# Patient Record
Sex: FEMALE | Race: WHITE | NOT HISPANIC OR LATINO | Employment: OTHER | URBAN - METROPOLITAN AREA
[De-identification: names, ages, dates, MRNs, and addresses within clinical notes are randomized per-mention and may not be internally consistent; named-entity substitution may affect disease eponyms.]

---

## 2024-01-26 ENCOUNTER — HOSPITAL ENCOUNTER (OUTPATIENT)
Dept: RADIOLOGY | Facility: HOSPITAL | Age: 74
Discharge: HOME | End: 2024-01-26
Payer: MEDICARE

## 2024-01-26 DIAGNOSIS — R10.30 LOWER ABDOMINAL PAIN, UNSPECIFIED: ICD-10-CM

## 2024-01-26 DIAGNOSIS — R10.30 LOWER ABDOMINAL PAIN, UNSPECIFIED: Primary | ICD-10-CM

## 2024-01-26 LAB
CREAT SERPL-MCNC: 1.5 MG/DL (ref 0.4–1.6)
EGFRCR SERPLBLD CKD-EPI 2021: 36 ML/MIN/1.73M*2

## 2024-01-26 PROCEDURE — 82565 ASSAY OF CREATININE: CPT

## 2024-01-26 PROCEDURE — 36415 COLL VENOUS BLD VENIPUNCTURE: CPT

## 2024-01-26 PROCEDURE — 74176 CT ABD & PELVIS W/O CONTRAST: CPT

## 2024-02-02 ENCOUNTER — OFFICE VISIT (OUTPATIENT)
Dept: HEMATOLOGY/ONCOLOGY | Facility: CLINIC | Age: 74
End: 2024-02-02
Payer: MEDICARE

## 2024-02-02 VITALS
HEART RATE: 68 BPM | HEIGHT: 63 IN | DIASTOLIC BLOOD PRESSURE: 73 MMHG | TEMPERATURE: 97.3 F | WEIGHT: 202.05 LBS | SYSTOLIC BLOOD PRESSURE: 108 MMHG | OXYGEN SATURATION: 96 % | BODY MASS INDEX: 35.8 KG/M2 | RESPIRATION RATE: 18 BRPM

## 2024-02-02 DIAGNOSIS — C85.90 LYMPHOMA (MULTI): ICD-10-CM

## 2024-02-02 PROCEDURE — 99205 OFFICE O/P NEW HI 60 MIN: CPT | Performed by: INTERNAL MEDICINE

## 2024-02-02 PROCEDURE — 1159F MED LIST DOCD IN RCRD: CPT | Performed by: INTERNAL MEDICINE

## 2024-02-02 PROCEDURE — 1126F AMNT PAIN NOTED NONE PRSNT: CPT | Performed by: INTERNAL MEDICINE

## 2024-02-02 PROCEDURE — 99215 OFFICE O/P EST HI 40 MIN: CPT | Performed by: INTERNAL MEDICINE

## 2024-02-02 PROCEDURE — 1036F TOBACCO NON-USER: CPT | Performed by: INTERNAL MEDICINE

## 2024-02-02 RX ORDER — LISINOPRIL 5 MG/1
5 TABLET ORAL DAILY
COMMUNITY

## 2024-02-02 RX ORDER — PANTOPRAZOLE SODIUM 40 MG/1
40 TABLET, DELAYED RELEASE ORAL
COMMUNITY
End: 2024-05-09

## 2024-02-02 RX ORDER — LEVOTHYROXINE SODIUM 50 UG/1
50 TABLET ORAL
COMMUNITY

## 2024-02-02 RX ORDER — ALLOPURINOL 300 MG/1
300 TABLET ORAL DAILY
COMMUNITY
End: 2024-02-12 | Stop reason: ALTCHOICE

## 2024-02-02 RX ORDER — PRAVASTATIN SODIUM 80 MG/1
80 TABLET ORAL DAILY
COMMUNITY

## 2024-02-02 RX ORDER — FERROUS SULFATE, DRIED 160(50) MG
2 TABLET, EXTENDED RELEASE ORAL DAILY
COMMUNITY

## 2024-02-02 RX ORDER — FUROSEMIDE 20 MG/1
TABLET ORAL
COMMUNITY
End: 2024-05-09

## 2024-02-02 RX ORDER — TRAMADOL HYDROCHLORIDE 50 MG/1
50 TABLET ORAL EVERY 6 HOURS PRN
Qty: 100 TABLET | Refills: 0 | Status: SHIPPED | OUTPATIENT
Start: 2024-02-02 | End: 2024-03-05

## 2024-02-02 RX ORDER — DICYCLOMINE HYDROCHLORIDE 20 MG/1
TABLET ORAL 3 TIMES DAILY PRN
COMMUNITY

## 2024-02-02 RX ORDER — MELOXICAM 7.5 MG/1
7.5 TABLET ORAL DAILY
COMMUNITY
End: 2024-02-12 | Stop reason: ALTCHOICE

## 2024-02-02 RX ORDER — CIPROFLOXACIN 500 MG/5ML
KIT ORAL
COMMUNITY
End: 2024-02-12 | Stop reason: ALTCHOICE

## 2024-02-02 RX ORDER — METOPROLOL SUCCINATE 50 MG/1
50 TABLET, EXTENDED RELEASE ORAL DAILY
COMMUNITY

## 2024-02-02 RX ORDER — HYDROXYCHLOROQUINE SULFATE 200 MG/1
200 TABLET, FILM COATED ORAL DAILY
COMMUNITY
End: 2024-05-09

## 2024-02-02 ASSESSMENT — COLUMBIA-SUICIDE SEVERITY RATING SCALE - C-SSRS
2. HAVE YOU ACTUALLY HAD ANY THOUGHTS OF KILLING YOURSELF?: NO
6. HAVE YOU EVER DONE ANYTHING, STARTED TO DO ANYTHING, OR PREPARED TO DO ANYTHING TO END YOUR LIFE?: NO
1. IN THE PAST MONTH, HAVE YOU WISHED YOU WERE DEAD OR WISHED YOU COULD GO TO SLEEP AND NOT WAKE UP?: NO

## 2024-02-02 ASSESSMENT — ENCOUNTER SYMPTOMS
DEPRESSION: 0
LOSS OF SENSATION IN FEET: 0
OCCASIONAL FEELINGS OF UNSTEADINESS: 0

## 2024-02-02 ASSESSMENT — PATIENT HEALTH QUESTIONNAIRE - PHQ9
2. FEELING DOWN, DEPRESSED OR HOPELESS: NOT AT ALL
SUM OF ALL RESPONSES TO PHQ9 QUESTIONS 1 AND 2: 0
1. LITTLE INTEREST OR PLEASURE IN DOING THINGS: NOT AT ALL

## 2024-02-02 ASSESSMENT — PAIN SCALES - GENERAL: PAINLEVEL: 0-NO PAIN

## 2024-02-02 NOTE — PROGRESS NOTES
Patient ID: Kellie Rae is a 73 y.o. female.  Referring Physician: Douglas Yañez MD  [id number] [place] [address]  Primary Care Provider: Lianne Mojica MD  Visit Type:  Initial Visit       Subjective    HPI  Patient is a poor historian and is unable to give any appreciable history.  Referred by primary care doctor because of the results of CT chest abdomen and pelvis showing a soft tissue mass lesion within the mesentery with multiple retroperitoneal and mesenteric lymph and adenopathy.  Radiographically the findings could represent lymphoma.  I was able to elicit from had that his symptoms began about 5 years ago with pain in the abdomen and the back she subsequently was diagnosed with gout and it is not clear the etiology of the of the gout currently on allopurinol and colchicine.  She is also on nonsteroidals anti-inflammatory agents as well.  She was referred to medical oncology because a CT scan of the abdomen showed intra-abdominal lesions reminiscent of lymphadenopathy/Lymphoma.  Biopsy has been ordered to confirm the diagnosis.    CT CAP: 1/26/2024: Bowel: No bowel wall thickening or abnormal distention to suggest  bowel obstruction. Mesenteric Lymph Nodes: Enlarged soft tissue masses are noted within the gastrohepatic ligament, the largest measures 4.0 by 4.3 cm. Enlarged periportal soft tissue mass is noted extending into the portacaval space. It measures at least 4.3 by 4.6 by 3.8 cm. Enlarged portacaval soft tissue lesion measures 2.0 by 2.5 cm. Peritoneum: Soft tissue mass is noted centrally within the abdomen within the mesentery measuring approximately 5.3 by 8.2 cm. Vessels: Unremarkable Retroperitoneum: Right periaortic soft tissue measures 2.1 by 1.8 cm. Multiple left periaortic lymph nodes are noted, the largest measuring 2.0 by 1.3 cm. Abdominal Wall: Unremarkable. Bones: No acute bony abnormalities.      IMPRESSION:  Soft tissue mass lesion within the mesentery  "with multiple  retroperitoneal and mesenteric lymph nodes. Findings may represent  lymphoma.      Signed by: Pedro Miller 1/26/2024  Review of Systems   Constitutional:  Positive for fatigue.   HENT:  Negative.     Eyes: Negative.    Gastrointestinal:  Positive for abdominal pain.        Objective   BSA: 2.02 meters squared  /73 (BP Location: Left arm, Patient Position: Sitting, BP Cuff Size: Large adult)   Pulse 68   Temp 36.3 °C (97.3 °F) (Temporal)   Resp 18   Ht (S) 1.609 m (5' 3.35\")   Wt 91.7 kg (202 lb 0.8 oz)   SpO2 96%   BMI 35.40 kg/m²      has no past medical history on file.   has no past surgical history on file.  No family history on file.  Oncology History    No history exists.       Kellie Rae  reports that she quit smoking about 25 years ago. Her smoking use included cigarettes. She has never used smokeless tobacco.  She  reports no history of alcohol use.  She  reports no history of drug use.    Physical Exam  Constitutional:       Appearance: Normal appearance. She is ill-appearing.   HENT:      Head: Normocephalic and atraumatic.   Eyes:      Extraocular Movements: Extraocular movements intact.      Pupils: Pupils are equal, round, and reactive to light.   Neurological:      Mental Status: She is alert.         WBC   Date/Time Value Ref Range Status   09/24/2019 09:55 AM 9.0 4.5 - 11.0 K/UL Final   09/10/2019 10:30 AM 7.6 4.5 - 11.0 K/UL Final   08/27/2019 09:53 AM 7.8 4.5 - 11.0 K/UL Final     nRBC   Date Value Ref Range Status   09/24/2019 0 0 /100 WBC Final   09/10/2019 0 0 /100 WBC Final   08/27/2019 0 0 /100 WBC Final     RBC   Date Value Ref Range Status   09/24/2019 5.02 (H) 4.0 - 4.9 M/UL Final   09/10/2019 5.26 (H) 4.0 - 4.9 M/UL Final   08/27/2019 4.92 (H) 4.0 - 4.9 M/UL Final     Hemoglobin   Date Value Ref Range Status   09/24/2019 14.9 12.0 - 15.0 GM/DL Final   09/10/2019 15.8 (H) 12.0 - 15.0 GM/DL Final   08/27/2019 14.9 12.0 - 15.0 GM/DL Final     Hematocrit " "  Date Value Ref Range Status   09/24/2019 47.7 (H) 36 - 44 % Final   09/10/2019 50.1 (H) 36 - 44 % Final   08/27/2019 46.8 (H) 36 - 44 % Final     MCV   Date/Time Value Ref Range Status   09/24/2019 09:55 AM 95.0 80 - 100 FL Final   09/10/2019 10:30 AM 95.2 80 - 100 FL Final   08/27/2019 09:53 AM 95.1 80 - 100 FL Final     MCH   Date/Time Value Ref Range Status   09/24/2019 09:55 AM 29.7 26 - 34 PG Final   09/10/2019 10:30 AM 30.0 26 - 34 PG Final   08/27/2019 09:53 AM 30.3 26 - 34 PG Final     MCHC   Date/Time Value Ref Range Status   09/24/2019 09:55 AM 31.2 31 - 37 % Final   09/10/2019 10:30 AM 31.5 31 - 37 % Final   08/27/2019 09:53 AM 31.8 31 - 37 % Final     No results found for: \"RDW\"  Platelets   Date/Time Value Ref Range Status   09/24/2019 09:55  150 - 450 K/UL Final   09/10/2019 10:30  150 - 450 K/UL Final   08/27/2019 09:53  150 - 450 K/UL Final     MPV   Date/Time Value Ref Range Status   09/24/2019 09:55 AM 10.2 7.0 - 12.6 CU Final   09/10/2019 10:30 AM 10.6 7.0 - 12.6 CU Final   08/27/2019 09:53 AM 10.4 7.0 - 12.6 CU Final     No results found for: \"NEUTOPHILPCT\"  No results found for: \"IGPCT\"  Lymphocytes %   Date/Time Value Ref Range Status   09/24/2019 09:55 AM 29.90 20 - 40 % Final   09/10/2019 10:30 AM 29.30 20 - 40 % Final   08/27/2019 09:53 AM 29.90 20 - 40 % Final     Monocytes %   Date/Time Value Ref Range Status   09/24/2019 09:55 AM 8.30 (H) 0 - 8 % Final   09/10/2019 10:30 AM 8.70 (H) 0 - 8 % Final   08/27/2019 09:53 AM 9.20 (H) 0 - 8 % Final     No results found for: \"EOSPCT\"  Basophils %   Date/Time Value Ref Range Status   09/24/2019 09:55 AM 0.70 0 - 1 % Final   09/10/2019 10:30 AM 0.70 0 - 1 % Final   08/27/2019 09:53 AM 0.60 0 - 1 % Final     Neutrophils Absolute   Date/Time Value Ref Range Status   09/24/2019 09:55 AM 5.39 1.8 - 7.7 K/UL Final   09/10/2019 10:30 AM 4.52 1.8 - 7.7 K/UL Final   08/27/2019 09:53 AM 4.59 1.8 - 7.7 K/UL Final     Immature Granulocytes " "Absolute, Automated   Date/Time Value Ref Range Status   09/24/2019 09:55 AM 0.01 0.0 - 0.1 K/UL Final   09/10/2019 10:30 AM 0.02 0.0 - 0.1 K/UL Final   08/27/2019 09:53 AM 0.02 0.0 - 0.1 K/UL Final     Lymphocytes Absolute   Date/Time Value Ref Range Status   09/24/2019 09:55 AM 2.70 1.2 - 3.2 K/UL Final   09/10/2019 10:30 AM 2.22 1.2 - 3.2 K/UL Final   08/27/2019 09:53 AM 2.34 1.2 - 3.2 K/UL Final     Monocytes Absolute   Date/Time Value Ref Range Status   09/24/2019 09:55 AM 0.75 0 - 0.8 K/UL Final   09/10/2019 10:30 AM 0.66 0 - 0.8 K/UL Final   08/27/2019 09:53 AM 0.72 0 - 0.8 K/UL Final     Eosinophils Absolute   Date/Time Value Ref Range Status   09/24/2019 09:55 AM 0.11 0 - 0.45 K/UL Final   09/10/2019 10:30 AM 0.11 0 - 0.45 K/UL Final   08/27/2019 09:53 AM 0.11 0 - 0.45 K/UL Final     Basophils Absolute   Date/Time Value Ref Range Status   09/24/2019 09:55 AM 0.06 0.00 - 0.22 K/UL Final   09/10/2019 10:30 AM 0.05 0.00 - 0.22 K/UL Final   08/27/2019 09:53 AM 0.05 0.00 - 0.22 K/UL Final       No components found for: \"PT\"  No results found for: \"APTT\"    Assessment/Plan      Lymphadenopathy: Symptoms began 2 years ago: poor historian:   IR guided biopsy for the retroperitoneum: LN: RTC 3-4 weeks after Pathology:  Ultram prescribed for pain.     Patient is a poor historian but she indicates that his symptomatology began about 5 years ago initially only only back pain.  Unable to elicit that besides back pain she also had lower extremity pain and was diagnosed with gout.  Currently on allopurinol and colchicine as well as nonsteroidal anti-inflammatory agents.  Was referred because CT scan showed evidence of intra-abdominal lesions reminiscent of lymphadenopathy with a clinical diagnosis of possible lymphoma.    I have ordered an IR guided biopsy of the retroperitoneal lymphadenopathy.  As well as Ultram for pain control.  Further management will depend on the results of the pathological evaluation.  Diagnoses " and all orders for this visit:  Lymphoma (CMS/HCC)  -     Referral to Hematology and Oncology  -     traMADol (Ultram) 50 mg tablet; Take 1 tablet (50 mg) by mouth every 6 hours if needed for severe pain (7 - 10).  -     Clinic Appointment Request Follow Up; [facility]; Future  -     CT guided percutaneous biopsy retroperitoneum; Future           Contreras Lau MD

## 2024-02-02 NOTE — PATIENT INSTRUCTIONS
Today you met with Dr. Rowell.  The radiology department will call you to schedule the abdominal biopsy.  We will see you about 7 days after that procedure to review the results and make a treatment plan.    Please call the office for any questions or concerns.  We ask that you notify us 5-7 days before your medications need refilled.  NANCY Yousif is strongly encouraging all patients to access their MyChart for all results and to communicate with their provider for non-urgent messages.  If an urgent/same day/sick concern response is needed, please call the office at [phone]. Thank You!

## 2024-02-05 ASSESSMENT — ENCOUNTER SYMPTOMS
EYES NEGATIVE: 1
FATIGUE: 1
ABDOMINAL PAIN: 1

## 2024-02-12 ENCOUNTER — PRE-ADMISSION TESTING (OUTPATIENT)
Dept: PREADMISSION TESTING | Facility: HOSPITAL | Age: 74
End: 2024-02-12
Payer: MEDICARE

## 2024-02-12 VITALS
BODY MASS INDEX: 34.02 KG/M2 | HEART RATE: 105 BPM | DIASTOLIC BLOOD PRESSURE: 85 MMHG | OXYGEN SATURATION: 98 % | RESPIRATION RATE: 18 BRPM | HEIGHT: 64 IN | SYSTOLIC BLOOD PRESSURE: 141 MMHG | WEIGHT: 199.3 LBS | TEMPERATURE: 97.2 F

## 2024-02-12 PROCEDURE — 99203 OFFICE O/P NEW LOW 30 MIN: CPT | Performed by: PHYSICIAN ASSISTANT

## 2024-02-12 ASSESSMENT — CHADS2 SCORE
AGE GREATER THAN OR EQUAL TO 75: YES
CHF: NO
PRIOR STROKE OR TIA OR THROMBOEMBOLISM: NO
HYPERTENSION: YES
CHADS2 SCORE: 2
DIABETES: NO

## 2024-02-12 ASSESSMENT — DUKE ACTIVITY SCORE INDEX (DASI)
CAN YOU TAKE CARE OF YOURSELF (EAT, DRESS, BATHE, OR USE TOILET): YES
CAN YOU WALK INDOORS, SUCH AS AROUND YOUR HOUSE: YES
CAN YOU WALK A BLOCK OR TWO ON LEVEL GROUND: YES
CAN YOU HAVE SEXUAL RELATIONS: YES
TOTAL_SCORE: 19.7
CAN YOU DO MODERATE WORK AROUND THE HOUSE LIKE VACUUMING, SWEEPING FLOORS OR CARRYING GROCERIES: NO
DASI METS SCORE: 5.2
CAN YOU PARTICIPATE IN MODERATE RECREATIONAL ACTIVITIES LIKE GOLF, BOWLING, DANCING, DOUBLES TENNIS OR THROWING A BASEBALL OR FOOTBALL: NO
CAN YOU CLIMB A FLIGHT OF STAIRS OR WALK UP A HILL: NO
CAN YOU DO YARD WORK LIKE RAKING LEAVES, WEEDING OR PUSHING A MOWER: YES
CAN YOU RUN A SHORT DISTANCE: NO
CAN YOU PARTICIPATE IN STRENOUS SPORTS LIKE SWIMMING, SINGLES TENNIS, FOOTBALL, BASKETBALL, OR SKIING: NO
CAN YOU DO LIGHT WORK AROUND THE HOUSE LIKE DUSTING OR WASHING DISHES: YES
CAN YOU DO HEAVY WORK AROUND THE HOUSE LIKE SCRUBBING FLOORS OR LIFTING AND MOVING HEAVY FURNITURE: NO

## 2024-02-12 ASSESSMENT — ENCOUNTER SYMPTOMS
ROS GI COMMENTS: SEE HPI
CONSTITUTIONAL NEGATIVE: 1
ENDOCRINE NEGATIVE: 1
RESPIRATORY NEGATIVE: 1
EYES NEGATIVE: 1
PSYCHIATRIC NEGATIVE: 1
CARDIOVASCULAR NEGATIVE: 1
NEUROLOGICAL NEGATIVE: 1
ARTHRALGIAS: 1
BACK PAIN: 1

## 2024-02-12 ASSESSMENT — PAIN SCALES - GENERAL: PAINLEVEL_OUTOF10: 9

## 2024-02-12 ASSESSMENT — PAIN DESCRIPTION - DESCRIPTORS: DESCRIPTORS: CRAMPING;SHARP

## 2024-02-12 ASSESSMENT — PAIN - FUNCTIONAL ASSESSMENT: PAIN_FUNCTIONAL_ASSESSMENT: 0-10

## 2024-02-12 NOTE — CPM/PAT H&P
"CPM/PAT Evaluation       Name: Kellie Rae (Kellie Rae)  /Age: 1950/73 y.o.     In-Person       Chief Complaint: \"abdominal pain\"    HPI  The patient is a 73 year old female.  For more than 1 year she has had intermittent, but chronic abdominal pain that can occur at anytime and can also wake her from sleep.  The pain is unchanged when eating.  She has associated nausea, occasional constipation and a 30 lb unintentional weight loss.  On 2024 a CT of the abdomen and pelvis demonstrated a soft tissue mass lesion within the mesentery with multiple retroperitoneal and mesenteric lymph nodes.  A retroperitoneum biopsy is recommended.    Past Medical History:   Diagnosis Date    GERD (gastroesophageal reflux disease)     Gout     Hyperlipidemia     Hypertension     Hypothyroidism     Sleep apnea        Past Surgical History:   Procedure Laterality Date    APPENDECTOMY      CATARACT EXTRACTION Bilateral     COLONOSCOPY       Social History     Tobacco Use    Smoking status: Former     Packs/day: 1.00     Years: 31.00     Additional pack years: 0.00     Total pack years: 31.00     Types: Cigarettes     Start date:      Quit date:      Years since quittin.1    Smokeless tobacco: Never   Substance Use Topics    Alcohol use: Never     Social History     Substance and Sexual Activity   Drug Use Never     No Known Allergies    Current Outpatient Medications   Medication Sig Dispense Refill    calcium carbonate-vitamin D3 500 mg-5 mcg (200 unit) tablet Take 2 tablets by mouth once daily.      dicyclomine (Bentyl) 20 mg tablet Take by mouth 3 times a day as needed.      furosemide (Lasix) 20 mg tablet Take by mouth once daily (M-F).      hydroxychloroquine (Plaquenil) 200 mg tablet Take 1 tablet (200 mg) by mouth once daily.      levothyroxine (Synthroid, Levoxyl) 50 mcg tablet Take 1 tablet (50 mcg) by mouth once daily in the morning. Take before meals.      lisinopril 5 mg tablet Take 1 " "tablet (5 mg) by mouth once daily.      metoprolol succinate XL (Toprol-XL) 50 mg 24 hr tablet Take 1 tablet (50 mg) by mouth once daily. Do not crush or chew.      pantoprazole (ProtoNix) 40 mg EC tablet Take 1 tablet (40 mg) by mouth once daily in the morning. Take before meals. Do not crush, chew, or split.      pravastatin (Pravachol) 80 mg tablet Take 1 tablet (80 mg) by mouth once daily.      traMADol (Ultram) 50 mg tablet Take 1 tablet (50 mg) by mouth every 6 hours if needed for severe pain (7 - 10). 100 tablet 0     No current facility-administered medications for this visit.     Review of Systems   Constitutional: Negative.    HENT: Negative.     Eyes: Negative.    Respiratory: Negative.     Cardiovascular: Negative.    Gastrointestinal:         See HPI   Endocrine: Negative.    Genitourinary: Negative.    Musculoskeletal:  Positive for arthralgias and back pain.   Neurological: Negative.    Psychiatric/Behavioral: Negative.       /85   Pulse 105   Temp 36.2 °C (97.2 °F)   Resp 18   Ht 1.626 m (5' 4\")   Wt 90.4 kg (199 lb 4.7 oz)   SpO2 98%   BMI 34.21 kg/m²     Physical Exam  Vitals reviewed.   Constitutional:       Comments: Overweight   HENT:      Head: Normocephalic and atraumatic.      Mouth/Throat:      Mouth: Mucous membranes are moist.      Pharynx: Oropharynx is clear.   Eyes:      Extraocular Movements: Extraocular movements intact.      Pupils: Pupils are equal, round, and reactive to light.   Cardiovascular:      Rate and Rhythm: Regular rhythm. Tachycardia present.   Pulmonary:      Breath sounds: Normal breath sounds.   Abdominal:      General: Bowel sounds are normal.      Palpations: Abdomen is soft.      Comments: No tenderness with palpation X 4 quadrants.   Musculoskeletal:         General: Normal range of motion.   Skin:     General: Skin is warm and dry.   Neurological:      General: No focal deficit present.      Mental Status: She is alert and oriented to person, place, " and time.   Psychiatric:         Mood and Affect: Mood normal.         Behavior: Behavior normal.        PAT AIRWAY:   Airway:     Mallampati::  II    TM distance::  >3 FB    Neck ROM::  Full   upper dentures and lower dentures    ASA:  II  DASI RISK SCORE:  19.7  METS SCORE:  5.2  CHAD2 SCORE:  4.0%  REVISED CARDIAC RISK INDEX:  0.4%  STOP BANG SCORE:  3    Assessment and Plan:     Lymphadenopathy: IR guided biopsy of retroperitoneum  Essential Hypertension  BENNIE - non-compliant with CPAP    Letty Tanner PA-C

## 2024-02-14 ENCOUNTER — HOSPITAL ENCOUNTER (OUTPATIENT)
Dept: RADIOLOGY | Facility: HOSPITAL | Age: 74
Discharge: HOME | End: 2024-02-14
Payer: MEDICARE

## 2024-02-14 VITALS
DIASTOLIC BLOOD PRESSURE: 88 MMHG | HEART RATE: 76 BPM | OXYGEN SATURATION: 94 % | SYSTOLIC BLOOD PRESSURE: 154 MMHG | RESPIRATION RATE: 18 BRPM

## 2024-02-14 DIAGNOSIS — C85.90 LYMPHOMA (MULTI): ICD-10-CM

## 2024-02-14 LAB
ERYTHROCYTE [DISTWIDTH] IN BLOOD BY AUTOMATED COUNT: 14.7 % (ref 11.5–14.5)
HCT VFR BLD AUTO: 39.9 % (ref 36–46)
HGB BLD-MCNC: 12.8 G/DL (ref 12–16)
INR PPP: 1.1 (ref 0.9–1.2)
MCH RBC QN AUTO: 27.8 PG (ref 26–34)
MCHC RBC AUTO-ENTMCNC: 32.1 G/DL (ref 32–36)
MCV RBC AUTO: 87 FL (ref 80–100)
NRBC BLD-RTO: 0 /100 WBCS (ref 0–0)
PLATELET # BLD AUTO: 247 X10*3/UL (ref 150–450)
PROTHROMBIN TIME: 11.4 SECONDS (ref 9.3–12.7)
RBC # BLD AUTO: 4.6 X10*6/UL (ref 4–5.2)
WBC # BLD AUTO: 9.3 X10*3/UL (ref 4.4–11.3)

## 2024-02-14 PROCEDURE — 99152 MOD SED SAME PHYS/QHP 5/>YRS: CPT

## 2024-02-14 PROCEDURE — 85027 COMPLETE CBC AUTOMATED: CPT | Performed by: RADIOLOGY

## 2024-02-14 PROCEDURE — 88341 IMHCHEM/IMCYTCHM EA ADD ANTB: CPT | Performed by: PATHOLOGY

## 2024-02-14 PROCEDURE — 88307 TISSUE EXAM BY PATHOLOGIST: CPT | Performed by: PATHOLOGY

## 2024-02-14 PROCEDURE — 88307 TISSUE EXAM BY PATHOLOGIST: CPT | Mod: TC,SUR,WESLAB | Performed by: RADIOLOGY

## 2024-02-14 PROCEDURE — 88342 IMHCHEM/IMCYTCHM 1ST ANTB: CPT | Performed by: PATHOLOGY

## 2024-02-14 PROCEDURE — 77012 CT SCAN FOR NEEDLE BIOPSY: CPT

## 2024-02-14 PROCEDURE — 99153 MOD SED SAME PHYS/QHP EA: CPT

## 2024-02-14 PROCEDURE — 88185 FLOWCYTOMETRY/TC ADD-ON: CPT | Mod: TC,WESLAB | Performed by: INTERNAL MEDICINE

## 2024-02-14 PROCEDURE — 2720000007 HC OR 272 NO HCPCS

## 2024-02-14 PROCEDURE — 2500000005 HC RX 250 GENERAL PHARMACY W/O HCPCS: Performed by: RADIOLOGY

## 2024-02-14 PROCEDURE — 88189 FLOWCYTOMETRY/READ 16 & >: CPT | Performed by: INTERNAL MEDICINE

## 2024-02-14 PROCEDURE — 2500000004 HC RX 250 GENERAL PHARMACY W/ HCPCS (ALT 636 FOR OP/ED): Performed by: RADIOLOGY

## 2024-02-14 PROCEDURE — 88365 INSITU HYBRIDIZATION (FISH): CPT | Performed by: PATHOLOGY

## 2024-02-14 PROCEDURE — 85610 PROTHROMBIN TIME: CPT | Performed by: RADIOLOGY

## 2024-02-14 PROCEDURE — 36415 COLL VENOUS BLD VENIPUNCTURE: CPT | Performed by: RADIOLOGY

## 2024-02-14 RX ORDER — DEXTROSE MONOHYDRATE AND SODIUM CHLORIDE 5; .45 G/100ML; G/100ML
50 INJECTION, SOLUTION INTRAVENOUS CONTINUOUS
Status: DISCONTINUED | OUTPATIENT
Start: 2024-02-14 | End: 2024-02-15 | Stop reason: HOSPADM

## 2024-02-14 RX ORDER — FENTANYL CITRATE 50 UG/ML
INJECTION, SOLUTION INTRAMUSCULAR; INTRAVENOUS
Status: COMPLETED | OUTPATIENT
Start: 2024-02-14 | End: 2024-02-14

## 2024-02-14 RX ORDER — MIDAZOLAM HYDROCHLORIDE 1 MG/ML
INJECTION, SOLUTION INTRAMUSCULAR; INTRAVENOUS
Status: COMPLETED | OUTPATIENT
Start: 2024-02-14 | End: 2024-02-14

## 2024-02-14 RX ORDER — LIDOCAINE HYDROCHLORIDE 10 MG/ML
INJECTION, SOLUTION EPIDURAL; INFILTRATION; INTRACAUDAL; PERINEURAL
Status: COMPLETED | OUTPATIENT
Start: 2024-02-14 | End: 2024-02-14

## 2024-02-14 RX ADMIN — LIDOCAINE HYDROCHLORIDE 5 ML: 10 INJECTION, SOLUTION EPIDURAL; INFILTRATION; INTRACAUDAL; PERINEURAL at 09:15

## 2024-02-14 RX ADMIN — FENTANYL CITRATE 50 MCG: 0.05 INJECTION, SOLUTION INTRAMUSCULAR; INTRAVENOUS at 09:11

## 2024-02-14 RX ADMIN — MIDAZOLAM 1 MG: 1 INJECTION INTRAMUSCULAR; INTRAVENOUS at 09:11

## 2024-02-14 RX ADMIN — FENTANYL CITRATE 50 MCG: 0.05 INJECTION, SOLUTION INTRAMUSCULAR; INTRAVENOUS at 09:43

## 2024-02-14 ASSESSMENT — PAIN - FUNCTIONAL ASSESSMENT
PAIN_FUNCTIONAL_ASSESSMENT: 0-10
PAIN_FUNCTIONAL_ASSESSMENT: 0-10

## 2024-02-14 ASSESSMENT — PAIN SCALES - GENERAL
PAINLEVEL_OUTOF10: 0 - NO PAIN
PAINLEVEL_OUTOF10: 4
PAINLEVEL_OUTOF10: 7
PAINLEVEL_OUTOF10: 0 - NO PAIN
PAINLEVEL_OUTOF10: 8

## 2024-02-20 LAB
CELL COUNT (BLOOD): 0.05 X10*3/UL
CELL POPULATIONS: NORMAL
DIAGNOSIS: NORMAL
FLOW DIFFERENTIAL: NORMAL
FLOW TEST ORDERED: NORMAL
LAB TEST METHOD: NORMAL
NUMBER OF CELLS COLLECTED: NORMAL
PATH REPORT.TOTAL CANCER: NORMAL
SIGNATURE COMMENT: NORMAL
SPECIMEN VIABILITY: NORMAL

## 2024-02-22 LAB
LAB AP ASR DISCLAIMER: NORMAL
LABORATORY COMMENT REPORT: NORMAL
PATH REPORT.FINAL DX SPEC: NORMAL
PATH REPORT.GROSS SPEC: NORMAL
PATH REPORT.RELEVANT HX SPEC: NORMAL
PATH REPORT.TOTAL CANCER: NORMAL

## 2024-02-23 ENCOUNTER — OFFICE VISIT (OUTPATIENT)
Dept: HEMATOLOGY/ONCOLOGY | Facility: CLINIC | Age: 74
End: 2024-02-23
Payer: MEDICARE

## 2024-02-23 VITALS
SYSTOLIC BLOOD PRESSURE: 128 MMHG | DIASTOLIC BLOOD PRESSURE: 71 MMHG | RESPIRATION RATE: 18 BRPM | HEART RATE: 65 BPM | WEIGHT: 196.43 LBS | TEMPERATURE: 96.4 F | BODY MASS INDEX: 33.72 KG/M2 | OXYGEN SATURATION: 95 %

## 2024-02-23 DIAGNOSIS — T36.5X5A ADVERSE EFFECT OF AMINOGLYCOSIDES, INITIAL ENCOUNTER: ICD-10-CM

## 2024-02-23 DIAGNOSIS — C83.30 DIFFUSE LARGE B-CELL LYMPHOMA, UNSPECIFIED BODY REGION (MULTI): Primary | ICD-10-CM

## 2024-02-23 DIAGNOSIS — C85.90 LYMPHOMA (MULTI): ICD-10-CM

## 2024-02-23 LAB
ALBUMIN SERPL BCP-MCNC: 3.6 G/DL (ref 3.4–5)
ALP SERPL-CCNC: 145 U/L (ref 33–136)
ALT SERPL W P-5'-P-CCNC: 27 U/L (ref 7–45)
ANION GAP SERPL CALC-SCNC: 16 MMOL/L (ref 10–20)
AST SERPL W P-5'-P-CCNC: 16 U/L (ref 9–39)
BASOPHILS # BLD AUTO: 0.01 X10*3/UL (ref 0–0.1)
BASOPHILS NFR BLD AUTO: 0.1 %
BILIRUB SERPL-MCNC: 0.6 MG/DL (ref 0–1.2)
BUN SERPL-MCNC: 18 MG/DL (ref 6–23)
CALCIUM SERPL-MCNC: 9.6 MG/DL (ref 8.6–10.3)
CHLORIDE SERPL-SCNC: 98 MMOL/L (ref 98–107)
CO2 SERPL-SCNC: 28 MMOL/L (ref 21–32)
CREAT SERPL-MCNC: 1.09 MG/DL (ref 0.5–1.05)
EGFRCR SERPLBLD CKD-EPI 2021: 53 ML/MIN/1.73M*2
EOSINOPHIL # BLD AUTO: 0.16 X10*3/UL (ref 0–0.4)
EOSINOPHIL NFR BLD AUTO: 1.3 %
ERYTHROCYTE [DISTWIDTH] IN BLOOD BY AUTOMATED COUNT: 14.6 % (ref 11.5–14.5)
GLUCOSE SERPL-MCNC: 85 MG/DL (ref 74–99)
HBV CORE AB SER QL: NONREACTIVE
HBV SURFACE AB SER-ACNC: <3.1 MIU/ML
HBV SURFACE AG SERPL QL IA: NONREACTIVE
HCT VFR BLD AUTO: 39.1 % (ref 36–46)
HGB BLD-MCNC: 12 G/DL (ref 12–16)
IMM GRANULOCYTES # BLD AUTO: 0.07 X10*3/UL (ref 0–0.5)
IMM GRANULOCYTES NFR BLD AUTO: 0.6 % (ref 0–0.9)
LDH SERPL L TO P-CCNC: 299 U/L (ref 84–246)
LYMPHOCYTES # BLD AUTO: 1.44 X10*3/UL (ref 0.8–3)
LYMPHOCYTES NFR BLD AUTO: 12 %
MCH RBC QN AUTO: 26.7 PG (ref 26–34)
MCHC RBC AUTO-ENTMCNC: 30.7 G/DL (ref 32–36)
MCV RBC AUTO: 87 FL (ref 80–100)
MONOCYTES # BLD AUTO: 0.8 X10*3/UL (ref 0.05–0.8)
MONOCYTES NFR BLD AUTO: 6.7 %
NEUTROPHILS # BLD AUTO: 9.48 X10*3/UL (ref 1.6–5.5)
NEUTROPHILS NFR BLD AUTO: 79.3 %
PLATELET # BLD AUTO: 293 X10*3/UL (ref 150–450)
POTASSIUM SERPL-SCNC: 4 MMOL/L (ref 3.5–5.3)
PROT SERPL-MCNC: 6.7 G/DL (ref 6.4–8.2)
RBC # BLD AUTO: 4.49 X10*6/UL (ref 4–5.2)
SODIUM SERPL-SCNC: 138 MMOL/L (ref 136–145)
URATE SERPL-MCNC: 5.2 MG/DL (ref 2.3–6.7)
WBC # BLD AUTO: 12 X10*3/UL (ref 4.4–11.3)

## 2024-02-23 PROCEDURE — 1126F AMNT PAIN NOTED NONE PRSNT: CPT | Performed by: INTERNAL MEDICINE

## 2024-02-23 PROCEDURE — 1036F TOBACCO NON-USER: CPT | Performed by: INTERNAL MEDICINE

## 2024-02-23 PROCEDURE — 1159F MED LIST DOCD IN RCRD: CPT | Performed by: INTERNAL MEDICINE

## 2024-02-23 PROCEDURE — 99215 OFFICE O/P EST HI 40 MIN: CPT | Performed by: INTERNAL MEDICINE

## 2024-02-23 PROCEDURE — 36415 COLL VENOUS BLD VENIPUNCTURE: CPT | Performed by: INTERNAL MEDICINE

## 2024-02-23 RX ORDER — DOXORUBICIN HYDROCHLORIDE 2 MG/ML
50 INJECTION, SOLUTION INTRAVENOUS ONCE
Status: CANCELLED | OUTPATIENT
Start: 2024-03-07

## 2024-02-23 RX ORDER — ALBUTEROL SULFATE 0.83 MG/ML
3 SOLUTION RESPIRATORY (INHALATION) AS NEEDED
Status: CANCELLED | OUTPATIENT
Start: 2024-03-28

## 2024-02-23 RX ORDER — PROCHLORPERAZINE MALEATE 5 MG
10 TABLET ORAL EVERY 6 HOURS PRN
Status: CANCELLED | OUTPATIENT
Start: 2024-03-07

## 2024-02-23 RX ORDER — DIPHENHYDRAMINE HYDROCHLORIDE 50 MG/ML
50 INJECTION INTRAMUSCULAR; INTRAVENOUS AS NEEDED
Status: CANCELLED | OUTPATIENT
Start: 2024-03-28

## 2024-02-23 RX ORDER — DOXORUBICIN HYDROCHLORIDE 2 MG/ML
50 INJECTION, SOLUTION INTRAVENOUS ONCE
Status: CANCELLED | OUTPATIENT
Start: 2024-03-28

## 2024-02-23 RX ORDER — PROCHLORPERAZINE MALEATE 5 MG
10 TABLET ORAL EVERY 6 HOURS PRN
Status: CANCELLED | OUTPATIENT
Start: 2024-03-28

## 2024-02-23 RX ORDER — DIPHENHYDRAMINE HCL 50 MG
50 CAPSULE ORAL ONCE
Status: CANCELLED | OUTPATIENT
Start: 2024-03-28

## 2024-02-23 RX ORDER — DIPHENHYDRAMINE HCL 50 MG
50 CAPSULE ORAL ONCE
Status: CANCELLED | OUTPATIENT
Start: 2024-03-07

## 2024-02-23 RX ORDER — EPINEPHRINE 0.3 MG/.3ML
0.3 INJECTION SUBCUTANEOUS EVERY 5 MIN PRN
Status: CANCELLED | OUTPATIENT
Start: 2024-03-07

## 2024-02-23 RX ORDER — PROCHLORPERAZINE EDISYLATE 5 MG/ML
10 INJECTION INTRAMUSCULAR; INTRAVENOUS EVERY 6 HOURS PRN
Status: CANCELLED | OUTPATIENT
Start: 2024-03-07

## 2024-02-23 RX ORDER — PALONOSETRON 0.05 MG/ML
0.25 INJECTION, SOLUTION INTRAVENOUS ONCE
Status: CANCELLED | OUTPATIENT
Start: 2024-03-28

## 2024-02-23 RX ORDER — DIPHENHYDRAMINE HYDROCHLORIDE 50 MG/ML
50 INJECTION INTRAMUSCULAR; INTRAVENOUS AS NEEDED
Status: CANCELLED | OUTPATIENT
Start: 2024-03-07

## 2024-02-23 RX ORDER — FAMOTIDINE 10 MG/ML
20 INJECTION INTRAVENOUS ONCE AS NEEDED
Status: CANCELLED | OUTPATIENT
Start: 2024-03-28

## 2024-02-23 RX ORDER — PROCHLORPERAZINE EDISYLATE 5 MG/ML
10 INJECTION INTRAMUSCULAR; INTRAVENOUS EVERY 6 HOURS PRN
Status: CANCELLED | OUTPATIENT
Start: 2024-03-28

## 2024-02-23 RX ORDER — PALONOSETRON 0.05 MG/ML
0.25 INJECTION, SOLUTION INTRAVENOUS ONCE
Status: CANCELLED | OUTPATIENT
Start: 2024-03-07

## 2024-02-23 RX ORDER — ACETAMINOPHEN 325 MG/1
650 TABLET ORAL ONCE
Status: CANCELLED | OUTPATIENT
Start: 2024-03-07

## 2024-02-23 RX ORDER — FAMOTIDINE 10 MG/ML
20 INJECTION INTRAVENOUS ONCE AS NEEDED
Status: CANCELLED | OUTPATIENT
Start: 2024-03-07

## 2024-02-23 RX ORDER — EPINEPHRINE 0.3 MG/.3ML
0.3 INJECTION SUBCUTANEOUS EVERY 5 MIN PRN
Status: CANCELLED | OUTPATIENT
Start: 2024-03-28

## 2024-02-23 RX ORDER — ALBUTEROL SULFATE 0.83 MG/ML
3 SOLUTION RESPIRATORY (INHALATION) AS NEEDED
Status: CANCELLED | OUTPATIENT
Start: 2024-03-07

## 2024-02-23 RX ORDER — ACETAMINOPHEN 325 MG/1
650 TABLET ORAL ONCE
Status: CANCELLED | OUTPATIENT
Start: 2024-03-28

## 2024-02-23 ASSESSMENT — ENCOUNTER SYMPTOMS
ABDOMINAL PAIN: 1
EYES NEGATIVE: 1
FATIGUE: 1

## 2024-02-23 ASSESSMENT — PAIN SCALES - GENERAL: PAINLEVEL: 0-NO PAIN

## 2024-02-23 NOTE — PROGRESS NOTES
Patient ID: Kellie Rae is a 73 y.o. female.  Referring Physician: Contreras Rowell MD  [id number] [place] [address]  Primary Care Provider: Lianne Mojica MD  Visit Type:  Follow Up     Subjective    HPI  Referred by primary care doctor because of the results of CT chest abdomen and pelvis showing a soft tissue mass lesion within the mesentery with multiple retroperitoneal and mesenteric lymph and adenopathy.  Radiographically the findings could represent lymphoma.  I was able to elicit from had that his symptoms began about 5 years ago with pain in the abdomen and the back she subsequently was diagnosed with gout and it is not clear the etiology of the of the gout currently on allopurinol and colchicine.  She is also on nonsteroidals anti-inflammatory agents as well.  She was referred to medical oncology because a CT scan of the abdomen showed intra-abdominal lesions reminiscent of lymphadenopathy/Lymphoma.  Biopsy has been ordered to confirm the diagnosis.    FINAL DIAGNOSIS      A. SOFT TISSUE MASS, MESENTERY, BIOPSY:      --  DIFFUSE LARGE B CELL LYMPHOMA, FINAL CLASSIFICATION PENDING GENETIC STUDIES, SEE NOTE.          SUBTYPE BY PALMA CRITERIA:  NON GERMINAL CENTER CELL TYPE          MYC/BCL-2 EXPRESSION: NOT A DOUBLE EXPRESSOR (MYC-,BCL-2+)       Review of Systems   Constitutional:  Positive for fatigue.   HENT:  Negative.     Eyes: Negative.    Gastrointestinal:  Positive for abdominal pain.        Objective   BSA: 2.01 meters squared  /71 (BP Location: Left arm, Patient Position: Sitting, BP Cuff Size: Adult)   Pulse 65   Temp 35.8 °C (96.4 °F) (Temporal)   Resp 18   Wt 89.1 kg (196 lb 6.9 oz)   SpO2 95%   BMI 33.72 kg/m²      has a past medical history of GERD (gastroesophageal reflux disease), Gout, Hyperlipidemia, Hypertension, Hypothyroidism, and Sleep apnea.   has a past surgical history that includes Appendectomy; Colonoscopy; and Cataract  extraction (Bilateral).  No family history on file.  Oncology History    No history exists.       Kellie Rae  reports that she quit smoking about 25 years ago. Her smoking use included cigarettes. She started smoking about 56 years ago. She has a 31.00 pack-year smoking history. She has never used smokeless tobacco.  She  reports no history of alcohol use.  She  reports no history of drug use.    Physical Exam  Constitutional:       Appearance: Normal appearance.   HENT:      Head: Normocephalic and atraumatic.   Eyes:      Extraocular Movements: Extraocular movements intact.      Pupils: Pupils are equal, round, and reactive to light.   Musculoskeletal:      Cervical back: Normal range of motion and neck supple.   Neurological:      Mental Status: She is alert.         WBC   Date/Time Value Ref Range Status   02/14/2024 07:40 AM 9.3 4.4 - 11.3 x10*3/uL Final   09/24/2019 09:55 AM 9.0 4.5 - 11.0 K/UL Final   09/10/2019 10:30 AM 7.6 4.5 - 11.0 K/UL Final   08/27/2019 09:53 AM 7.8 4.5 - 11.0 K/UL Final     nRBC   Date Value Ref Range Status   02/14/2024 0.0 0.0 - 0.0 /100 WBCs Final   09/24/2019 0 0 /100 WBC Final   09/10/2019 0 0 /100 WBC Final   08/27/2019 0 0 /100 WBC Final     RBC   Date Value Ref Range Status   02/14/2024 4.60 4.00 - 5.20 x10*6/uL Final   09/24/2019 5.02 (H) 4.0 - 4.9 M/UL Final   09/10/2019 5.26 (H) 4.0 - 4.9 M/UL Final   08/27/2019 4.92 (H) 4.0 - 4.9 M/UL Final     Hemoglobin   Date Value Ref Range Status   02/14/2024 12.8 12.0 - 16.0 g/dL Final   09/24/2019 14.9 12.0 - 15.0 GM/DL Final   09/10/2019 15.8 (H) 12.0 - 15.0 GM/DL Final   08/27/2019 14.9 12.0 - 15.0 GM/DL Final     Hematocrit   Date Value Ref Range Status   02/14/2024 39.9 36.0 - 46.0 % Final   09/24/2019 47.7 (H) 36 - 44 % Final   09/10/2019 50.1 (H) 36 - 44 % Final   08/27/2019 46.8 (H) 36 - 44 % Final     MCV   Date/Time Value Ref Range Status   02/14/2024 07:40 AM 87 80 - 100 fL Final   09/24/2019 09:55 AM 95.0 80 - 100  "FL Final   09/10/2019 10:30 AM 95.2 80 - 100 FL Final   08/27/2019 09:53 AM 95.1 80 - 100 FL Final     MCH   Date/Time Value Ref Range Status   02/14/2024 07:40 AM 27.8 26.0 - 34.0 pg Final   09/24/2019 09:55 AM 29.7 26 - 34 PG Final   09/10/2019 10:30 AM 30.0 26 - 34 PG Final     MCHC   Date/Time Value Ref Range Status   02/14/2024 07:40 AM 32.1 32.0 - 36.0 g/dL Final   09/24/2019 09:55 AM 31.2 31 - 37 % Final   09/10/2019 10:30 AM 31.5 31 - 37 % Final   08/27/2019 09:53 AM 31.8 31 - 37 % Final     RDW   Date/Time Value Ref Range Status   02/14/2024 07:40 AM 14.7 (H) 11.5 - 14.5 % Final     Platelets   Date/Time Value Ref Range Status   02/14/2024 07:40  150 - 450 x10*3/uL Final   09/24/2019 09:55  150 - 450 K/UL Final   09/10/2019 10:30  150 - 450 K/UL Final   08/27/2019 09:53  150 - 450 K/UL Final     MPV   Date/Time Value Ref Range Status   09/24/2019 09:55 AM 10.2 7.0 - 12.6 CU Final   09/10/2019 10:30 AM 10.6 7.0 - 12.6 CU Final   08/27/2019 09:53 AM 10.4 7.0 - 12.6 CU Final     No results found for: \"NEUTOPHILPCT\"  No results found for: \"IGPCT\"  Lymphocytes %   Date/Time Value Ref Range Status   09/24/2019 09:55 AM 29.90 20 - 40 % Final   09/10/2019 10:30 AM 29.30 20 - 40 % Final   08/27/2019 09:53 AM 29.90 20 - 40 % Final     Monocytes %   Date/Time Value Ref Range Status   09/24/2019 09:55 AM 8.30 (H) 0 - 8 % Final   09/10/2019 10:30 AM 8.70 (H) 0 - 8 % Final   08/27/2019 09:53 AM 9.20 (H) 0 - 8 % Final     No results found for: \"EOSPCT\"  Basophils %   Date/Time Value Ref Range Status   09/24/2019 09:55 AM 0.70 0 - 1 % Final   09/10/2019 10:30 AM 0.70 0 - 1 % Final   08/27/2019 09:53 AM 0.60 0 - 1 % Final     Neutrophils Absolute   Date/Time Value Ref Range Status   09/24/2019 09:55 AM 5.39 1.8 - 7.7 K/UL Final   09/10/2019 10:30 AM 4.52 1.8 - 7.7 K/UL Final   08/27/2019 09:53 AM 4.59 1.8 - 7.7 K/UL Final     Immature Granulocytes Absolute, Automated   Date/Time Value Ref Range " Status   09/24/2019 09:55 AM 0.01 0.0 - 0.1 K/UL Final   09/10/2019 10:30 AM 0.02 0.0 - 0.1 K/UL Final   08/27/2019 09:53 AM 0.02 0.0 - 0.1 K/UL Final     Lymphocytes Absolute   Date/Time Value Ref Range Status   09/24/2019 09:55 AM 2.70 1.2 - 3.2 K/UL Final   09/10/2019 10:30 AM 2.22 1.2 - 3.2 K/UL Final   08/27/2019 09:53 AM 2.34 1.2 - 3.2 K/UL Final     Monocytes Absolute   Date/Time Value Ref Range Status   09/24/2019 09:55 AM 0.75 0 - 0.8 K/UL Final   09/10/2019 10:30 AM 0.66 0 - 0.8 K/UL Final   08/27/2019 09:53 AM 0.72 0 - 0.8 K/UL Final     Eosinophils Absolute   Date/Time Value Ref Range Status   09/24/2019 09:55 AM 0.11 0 - 0.45 K/UL Final   09/10/2019 10:30 AM 0.11 0 - 0.45 K/UL Final   08/27/2019 09:53 AM 0.11 0 - 0.45 K/UL Final     Basophils Absolute   Date/Time Value Ref Range Status   09/24/2019 09:55 AM 0.06 0.00 - 0.22 K/UL Final   09/10/2019 10:30 AM 0.05 0.00 - 0.22 K/UL Final   08/27/2019 09:53 AM 0.05 0.00 - 0.22 K/UL Final     CT CAP: 1/26/2024: Bowel: No bowel wall thickening or abnormal distention to suggest  bowel obstruction. Mesenteric Lymph Nodes: Enlarged soft tissue masses are noted within the gastrohepatic ligament, the largest measures 4.0 by 4.3 cm. Enlarged periportal soft tissue mass is noted extending into the portacaval space. It measures at least 4.3 by 4.6 by 3.8 cm. Enlarged portacaval soft tissue lesion measures 2.0 by 2.5 cm. Peritoneum: Soft tissue mass is noted centrally within the abdomen within the mesentery measuring approximately 5.3 by 8.2 cm. Vessels: Unremarkable Retroperitoneum: Right periaortic soft tissue measures 2.1 by 1.8 cm. Multiple left periaortic lymph nodes are noted, the largest measuring 2.0 by 1.3 cm. Abdominal Wall: Unremarkable. Bones: No acute bony abnormalities.      IMPRESSION:  Soft tissue mass lesion within the mesentery with multiple retroperitoneal and mesenteric lymph nodes.    Assessment/Plan      Biopsy reviewed with patient : Will proceed  with R CHOP: Consented and ordered: Mediport and NM PET. RTC at C1:     Diagnoses and all orders for this visit:  Diffuse large B-cell lymphoma, unspecified body region (CMS/HCC)  -     Clinic Appointment Request; Future  -     Infusion Appointment Request; Future  -     CBC and Auto Differential; Future  -     Comprehensive metabolic panel; Future  -     Hepatitis B surface antigen; Future  -     Hepatitis B Core Antibody, Total; Future  -     Hepatitis B surface antibody; Future  -     Lactate dehydrogenase; Future  -     Uric Acid; Future  -     Clinic Appointment Request; Future  -     Infusion Appointment Request; Future  -     CBC and Auto Differential; Future  -     Comprehensive metabolic panel; Future  -     Lactate dehydrogenase; Future  -     Uric Acid; Future  -     NM PET CT lymphoma staging; Future  -     Onco-Echo Complete (Strain & 3D); Future  -     IR CVC port placement; Future  Lymphoma (CMS/HCC)  -     Clinic Appointment Request Follow Up; [facility]  -     NM PET CT lymphoma staging; Future  -     Onco-Echo Complete (Strain & 3D); Future  -     IR CVC port placement; Future  Adverse effect of aminoglycosides, initial encounter  -     Onco-Echo Complete (Strain & 3D); Future  Other orders  -     palonosetron (Aloxi) injection 250 mcg  -     fosaprepitant (Emend) 150 mg in sodium chloride 0.9% 250 mL IV  -     prochlorperazine (Compazine) tablet 10 mg  -     prochlorperazine (Compazine) injection 10 mg  -     DOXOrubicin (Adriamycin) IV syringe 50 mg/m2 = 100 mg 50 mL  -     vinCRIStine (Oncovin) 2 mg in sodium chloride 0.9% 52 mL IV  -     cyclophosphamide (Cytoxan) 1,500 mg in sodium chloride 0.9% 175 mL IV  -     acetaminophen (Tylenol) tablet 650 mg  -     diphenhydrAMINE (BENADryl) capsule 50 mg  -     riTUXimab-pvvr (Ruxience) 753.75 mg in sodium chloride 0.9% 325.38 mL IV  -     sodium chloride 0.9 % bolus 500 mL  -     dextrose 5 % in water (D5W) bolus  -     diphenhydrAMINE  (BENADryl) injection 50 mg  -     methylPREDNISolone sod succinate (PF) (SOLU-Medrol) 40 mg/mL injection 40 mg  -     famotidine PF (Pepcid) injection 20 mg  -     EPINEPHrine (Epipen) injection syringe 0.3 mg  -     albuterol 2.5 mg /3 mL (0.083 %) nebulizer solution 3 mL  -     palonosetron (Aloxi) injection 250 mcg  -     fosaprepitant (Emend) 150 mg in sodium chloride 0.9% 250 mL IV  -     prochlorperazine (Compazine) tablet 10 mg  -     prochlorperazine (Compazine) injection 10 mg  -     DOXOrubicin (Adriamycin) IV syringe 50 mg/m2 = 100 mg 50 mL  -     vinCRIStine (Oncovin) 2 mg in sodium chloride 0.9% 52 mL IV  -     cyclophosphamide (Cytoxan) 1,500 mg in sodium chloride 0.9% 175 mL IV  -     acetaminophen (Tylenol) tablet 650 mg  -     diphenhydrAMINE (BENADryl) capsule 50 mg  -     riTUXimab-pvvr (Ruxience) 753.75 mg in sodium chloride 0.9% 325.38 mL IV  -     sodium chloride 0.9 % bolus 500 mL  -     dextrose 5 % in water (D5W) bolus  -     diphenhydrAMINE (BENADryl) injection 50 mg  -     methylPREDNISolone sod succinate (PF) (SOLU-Medrol) 40 mg/mL injection 40 mg  -     famotidine PF (Pepcid) injection 20 mg  -     EPINEPHrine (Epipen) injection syringe 0.3 mg  -     albuterol 2.5 mg /3 mL (0.083 %) nebulizer solution 3 mL           Contreras Rowell MD

## 2024-02-23 NOTE — PATIENT INSTRUCTIONS
Today you met with your Medical Oncologist.  At this appointment, you were provided a disease portfolio with information on the diagnosis, lexicomp sheets on the medications included in your regimen, an information sheet on watching the CHEMO CLASS prior to your first infusion, and your regimen schedule was discussed.  While everyone's regimen is unique to them, your schedule will be discussed at every [facility] visit.  Some regimens need additional procedures prior to initiating.  Please have the following performed prior to your first infusion.  Bloodwork, mediport and echo. Most regimens will include medications that can be taken as needed.  These medications include prednisone, allopurinol and nausea meds.  Please have these medications available at your home for your first infusion.    Please know that we encourage you to call our office for any concerns after starting your treatment regimen.  A provider is always available.  [phone]  [facility]     Radiology will call you to arrange the port placement.

## 2024-02-26 ENCOUNTER — TELEPHONE (OUTPATIENT)
Dept: HEMATOLOGY/ONCOLOGY | Facility: CLINIC | Age: 74
End: 2024-02-26
Payer: MEDICARE

## 2024-02-26 ENCOUNTER — HOSPITAL ENCOUNTER (OUTPATIENT)
Dept: CARDIOLOGY | Facility: HOSPITAL | Age: 74
Discharge: HOME | End: 2024-02-26
Payer: MEDICARE

## 2024-02-26 DIAGNOSIS — Z51.11 ENCOUNTER FOR ANTINEOPLASTIC CHEMOTHERAPY: ICD-10-CM

## 2024-02-26 DIAGNOSIS — C85.90 LYMPHOMA (MULTI): ICD-10-CM

## 2024-02-26 DIAGNOSIS — C83.30 DIFFUSE LARGE B-CELL LYMPHOMA, UNSPECIFIED BODY REGION (MULTI): ICD-10-CM

## 2024-02-26 DIAGNOSIS — T36.5X5A ADVERSE EFFECT OF AMINOGLYCOSIDES, INITIAL ENCOUNTER: ICD-10-CM

## 2024-02-26 PROCEDURE — 93306 TTE W/DOPPLER COMPLETE: CPT | Performed by: INTERNAL MEDICINE

## 2024-02-26 PROCEDURE — 76376 3D RENDER W/INTRP POSTPROCES: CPT | Performed by: INTERNAL MEDICINE

## 2024-02-26 PROCEDURE — 76376 3D RENDER W/INTRP POSTPROCES: CPT

## 2024-02-26 PROCEDURE — 93356 MYOCRD STRAIN IMG SPCKL TRCK: CPT | Performed by: INTERNAL MEDICINE

## 2024-02-26 RX ORDER — ONDANSETRON HYDROCHLORIDE 8 MG/1
8 TABLET, FILM COATED ORAL EVERY 8 HOURS PRN
Qty: 30 TABLET | Refills: 5 | Status: SHIPPED | OUTPATIENT
Start: 2024-02-26

## 2024-02-26 RX ORDER — ALLOPURINOL 300 MG/1
300 TABLET ORAL DAILY
Qty: 30 TABLET | Refills: 0 | Status: SHIPPED | OUTPATIENT
Start: 2024-02-26 | End: 2024-03-27

## 2024-02-26 RX ORDER — PROCHLORPERAZINE MALEATE 10 MG
10 TABLET ORAL EVERY 6 HOURS PRN
Qty: 30 TABLET | Refills: 5 | Status: SHIPPED | OUTPATIENT
Start: 2024-02-26

## 2024-02-26 RX ORDER — PREDNISONE 50 MG/1
TABLET ORAL
Qty: 10 TABLET | Refills: 5 | Status: SHIPPED | OUTPATIENT
Start: 2024-02-26

## 2024-02-26 NOTE — TELEPHONE ENCOUNTER
Called and told callerToy, that medications are ordered, discussed use.  Told to bring Prednisone with them on day of treatment, discussed use of Allopurinol and other antiemetics.  Also told to feel free to call with any questions.  Medications were sent to [facility] pharmacy.

## 2024-02-26 NOTE — TELEPHONE ENCOUNTER
Called and LM for pt on well identified answering machine to return call.  Pt's medications are ordered.

## 2024-02-27 ENCOUNTER — HOSPITAL ENCOUNTER (OUTPATIENT)
Dept: RADIOLOGY | Facility: HOSPITAL | Age: 74
Discharge: HOME | End: 2024-02-27
Payer: MEDICARE

## 2024-02-27 DIAGNOSIS — C83.30 DIFFUSE LARGE B-CELL LYMPHOMA, UNSPECIFIED BODY REGION (MULTI): ICD-10-CM

## 2024-02-27 DIAGNOSIS — C85.90 LYMPHOMA (MULTI): ICD-10-CM

## 2024-02-27 PROCEDURE — 78815 PET IMAGE W/CT SKULL-THIGH: CPT | Mod: PI

## 2024-02-27 PROCEDURE — 3430000001 HC RX 343 DIAGNOSTIC RADIOPHARMACEUTICALS: Mod: MUE | Performed by: INTERNAL MEDICINE

## 2024-02-27 PROCEDURE — A9552 F18 FDG: HCPCS | Mod: MUE | Performed by: INTERNAL MEDICINE

## 2024-02-27 PROCEDURE — 78816 PET IMAGE W/CT FULL BODY: CPT | Mod: PET TUMOR INIT TX STRAT | Performed by: NUCLEAR MEDICINE

## 2024-02-27 RX ORDER — FLUDEOXYGLUCOSE F 18 200 MCI/ML
11.9 INJECTION, SOLUTION INTRAVENOUS
Status: COMPLETED | OUTPATIENT
Start: 2024-02-27 | End: 2024-02-27

## 2024-02-27 RX ADMIN — FLUDEOXYGLUCOSE F 18 11.9 MILLICURIE: 200 INJECTION, SOLUTION INTRAVENOUS at 10:25

## 2024-02-28 LAB
AORTIC VALVE PEAK VELOCITY: 1.19 M/S
AV PEAK GRADIENT: 5.7 MMHG
AVA (PEAK VEL): 3.01 CM2
EJECTION FRACTION APICAL 4 CHAMBER: 56.3
EJECTION FRACTION: 61 %
GLOBAL LONGITUDINAL STRAIN: -17.1 %
LEFT ATRIUM VOLUME AREA LENGTH INDEX BSA: 25.5 ML/M2
LEFT VENTRICLE INTERNAL DIMENSION DIASTOLE: 4.38 CM (ref 3.5–6)
LEFT VENTRICULAR OUTFLOW TRACT DIAMETER: 2 CM
MITRAL VALVE E/A RATIO: 0.66
MITRAL VALVE E/E' RATIO: 9.28
RIGHT VENTRICLE FREE WALL PEAK S': 10.3 CM/S
RIGHT VENTRICLE PEAK SYSTOLIC PRESSURE: 26.6 MMHG
TRICUSPID ANNULAR PLANE SYSTOLIC EXCURSION: 1.6 CM

## 2024-03-01 NOTE — TELEPHONE ENCOUNTER
Called, spoke to Toy, and told, per Dr. Rowell, to take Prednisone prior to coming in on the day of treatment.  Also told to inform him when she started her Allopurinol.  Toy verbalized understanding and taught back.  He had a question regarding PET scan results but will discuss further with Dr. Rowell when they come in.

## 2024-03-05 ENCOUNTER — HOSPITAL ENCOUNTER (OUTPATIENT)
Dept: CARDIOLOGY | Facility: HOSPITAL | Age: 74
Setting detail: OUTPATIENT SURGERY
Discharge: HOME | End: 2024-03-05
Payer: MEDICARE

## 2024-03-05 VITALS
BODY MASS INDEX: 32.61 KG/M2 | WEIGHT: 190 LBS | SYSTOLIC BLOOD PRESSURE: 142 MMHG | DIASTOLIC BLOOD PRESSURE: 75 MMHG | RESPIRATION RATE: 21 BRPM | HEART RATE: 65 BPM | OXYGEN SATURATION: 95 %

## 2024-03-05 DIAGNOSIS — C85.90 LYMPHOMA (MULTI): ICD-10-CM

## 2024-03-05 DIAGNOSIS — C83.30 DIFFUSE LARGE B-CELL LYMPHOMA, UNSPECIFIED BODY REGION (MULTI): ICD-10-CM

## 2024-03-05 PROBLEM — E27.8: Status: ACTIVE | Noted: 2024-03-05

## 2024-03-05 PROBLEM — E78.5 HYPERLIPIDEMIA: Status: ACTIVE | Noted: 2024-03-05

## 2024-03-05 PROBLEM — I10 ESSENTIAL HYPERTENSION: Status: ACTIVE | Noted: 2024-03-05

## 2024-03-05 PROBLEM — Z13.6 ENCOUNTER FOR SCREENING FOR CARDIOVASCULAR DISORDERS: Status: ACTIVE | Noted: 2017-07-20

## 2024-03-05 PROBLEM — R06.00 DYSPNEA: Status: ACTIVE | Noted: 2024-03-05

## 2024-03-05 PROBLEM — I25.10 ATHSCL HEART DISEASE OF NATIVE CORONARY ARTERY W/O ANG PCTRS: Status: ACTIVE | Noted: 2017-07-20

## 2024-03-05 PROBLEM — R10.30 LOWER ABDOMINAL PAIN: Status: ACTIVE | Noted: 2024-03-05

## 2024-03-05 PROBLEM — E03.9 HYPOTHYROIDISM: Status: ACTIVE | Noted: 2024-03-05

## 2024-03-05 PROBLEM — G47.33 OSA (OBSTRUCTIVE SLEEP APNEA): Status: ACTIVE | Noted: 2024-03-05

## 2024-03-05 PROBLEM — Z13.220 ENCOUNTER FOR SCREENING FOR LIPOID DISORDERS: Status: ACTIVE | Noted: 2022-08-04

## 2024-03-05 PROBLEM — J42 CHRONIC BRONCHITIS (MULTI): Status: ACTIVE | Noted: 2024-03-05

## 2024-03-05 PROBLEM — F17.211 CIGARETTE NICOTINE DEPENDENCE IN REMISSION: Status: ACTIVE | Noted: 2024-03-05

## 2024-03-05 PROBLEM — E66.01 MORBIDLY OBESE (MULTI): Status: ACTIVE | Noted: 2024-03-05

## 2024-03-05 PROBLEM — E66.9 MILDLY OBESE: Status: ACTIVE | Noted: 2024-03-05

## 2024-03-05 PROBLEM — M81.8 OTHER OSTEOPOROSIS WITHOUT CURRENT PATHOLOGICAL FRACTURE: Status: ACTIVE | Noted: 2022-12-23

## 2024-03-05 PROBLEM — M1A.09X1 IDIOPATHIC CHRONIC GOUT OF MULTIPLE SITES WITH TOPHUS: Status: ACTIVE | Noted: 2019-01-03

## 2024-03-05 PROBLEM — I25.10 ATHEROSCLEROTIC HEART DISEASE OF NATIVE CORONARY ARTERY WITHOUT ANGINA PECTORIS: Status: ACTIVE | Noted: 2017-07-20

## 2024-03-05 PROCEDURE — 77001 FLUOROGUIDE FOR VEIN DEVICE: CPT | Performed by: RADIOLOGY

## 2024-03-05 PROCEDURE — 99152 MOD SED SAME PHYS/QHP 5/>YRS: CPT

## 2024-03-05 PROCEDURE — 2500000004 HC RX 250 GENERAL PHARMACY W/ HCPCS (ALT 636 FOR OP/ED): Performed by: RADIOLOGY

## 2024-03-05 PROCEDURE — 36561 INSERT TUNNELED CV CATH: CPT

## 2024-03-05 PROCEDURE — 2780000003 HC OR 278 NO HCPCS

## 2024-03-05 PROCEDURE — 76942 ECHO GUIDE FOR BIOPSY: CPT | Mod: 59 | Performed by: RADIOLOGY

## 2024-03-05 PROCEDURE — 99152 MOD SED SAME PHYS/QHP 5/>YRS: CPT | Performed by: RADIOLOGY

## 2024-03-05 PROCEDURE — 36561 INSERT TUNNELED CV CATH: CPT | Performed by: RADIOLOGY

## 2024-03-05 PROCEDURE — 7100000010 HC PHASE TWO TIME - EACH INCREMENTAL 1 MINUTE

## 2024-03-05 PROCEDURE — 76937 US GUIDE VASCULAR ACCESS: CPT | Performed by: RADIOLOGY

## 2024-03-05 PROCEDURE — 76942 ECHO GUIDE FOR BIOPSY: CPT

## 2024-03-05 PROCEDURE — C1788 PORT, INDWELLING, IMP: HCPCS

## 2024-03-05 PROCEDURE — 7100000009 HC PHASE TWO TIME - INITIAL BASE CHARGE

## 2024-03-05 PROCEDURE — 77001 FLUOROGUIDE FOR VEIN DEVICE: CPT

## 2024-03-05 PROCEDURE — 2500000005 HC RX 250 GENERAL PHARMACY W/O HCPCS: Performed by: RADIOLOGY

## 2024-03-05 PROCEDURE — 2720000007 HC OR 272 NO HCPCS

## 2024-03-05 PROCEDURE — 96372 THER/PROPH/DIAG INJ SC/IM: CPT | Performed by: RADIOLOGY

## 2024-03-05 RX ORDER — LIDOCAINE HYDROCHLORIDE AND EPINEPHRINE 10; 10 MG/ML; UG/ML
INJECTION, SOLUTION INFILTRATION; PERINEURAL AS NEEDED
Status: DISCONTINUED | OUTPATIENT
Start: 2024-03-05 | End: 2024-03-06 | Stop reason: HOSPADM

## 2024-03-05 RX ORDER — ASPIRIN 81 MG
TABLET, DELAYED RELEASE (ENTERIC COATED) ORAL
COMMUNITY
Start: 2022-08-03 | End: 2024-05-09

## 2024-03-05 RX ORDER — CIPROFLOXACIN 500 MG/5ML
KIT ORAL
COMMUNITY
End: 2024-05-09

## 2024-03-05 RX ORDER — ASPIRIN 81 MG/1
TABLET ORAL EVERY 24 HOURS
COMMUNITY
End: 2024-05-09

## 2024-03-05 RX ORDER — CIPROFLOXACIN 500 MG/1
TABLET ORAL EVERY 12 HOURS
COMMUNITY
Start: 2024-01-23 | End: 2024-05-09

## 2024-03-05 RX ORDER — IBUPROFEN 100 MG/5ML
1000 SUSPENSION, ORAL (FINAL DOSE FORM) ORAL
COMMUNITY
End: 2024-05-09

## 2024-03-05 RX ORDER — KETOTIFEN FUMARATE 0.35 MG/ML
SOLUTION/ DROPS OPHTHALMIC EVERY 12 HOURS
COMMUNITY
End: 2024-05-09

## 2024-03-05 RX ORDER — COLCHICINE 0.6 MG/1
0.6 TABLET ORAL DAILY
COMMUNITY
End: 2024-05-09

## 2024-03-05 RX ORDER — MELOXICAM 7.5 MG/1
TABLET ORAL
COMMUNITY
Start: 2023-12-13 | End: 2024-05-09

## 2024-03-05 RX ORDER — MIDAZOLAM HYDROCHLORIDE 1 MG/ML
INJECTION, SOLUTION INTRAMUSCULAR; INTRAVENOUS AS NEEDED
Status: DISCONTINUED | OUTPATIENT
Start: 2024-03-05 | End: 2024-03-06 | Stop reason: HOSPADM

## 2024-03-05 RX ORDER — TRIAMCINOLONE ACETONIDE 1 MG/G
1 CREAM TOPICAL EVERY 24 HOURS
COMMUNITY
Start: 2023-09-13 | End: 2024-05-09

## 2024-03-05 RX ORDER — HEPARIN 100 UNIT/ML
SYRINGE INTRAVENOUS AS NEEDED
Status: DISCONTINUED | OUTPATIENT
Start: 2024-03-05 | End: 2024-03-06 | Stop reason: HOSPADM

## 2024-03-05 RX ORDER — FENTANYL CITRATE 50 UG/ML
INJECTION, SOLUTION INTRAMUSCULAR; INTRAVENOUS AS NEEDED
Status: DISCONTINUED | OUTPATIENT
Start: 2024-03-05 | End: 2024-03-06 | Stop reason: HOSPADM

## 2024-03-05 RX ORDER — ACETAMINOPHEN 500 MG
TABLET ORAL EVERY 24 HOURS
COMMUNITY
End: 2024-05-09

## 2024-03-05 RX ADMIN — FENTANYL CITRATE 100 MCG: 50 INJECTION, SOLUTION INTRAMUSCULAR; INTRAVENOUS at 08:59

## 2024-03-05 RX ADMIN — LIDOCAINE HYDROCHLORIDE,EPINEPHRINE BITARTRATE 5 ML: 10; .01 INJECTION, SOLUTION INFILTRATION; PERINEURAL at 09:00

## 2024-03-05 RX ADMIN — MIDAZOLAM 1 MG: 1 INJECTION INTRAMUSCULAR; INTRAVENOUS at 08:59

## 2024-03-05 RX ADMIN — LIDOCAINE HYDROCHLORIDE,EPINEPHRINE BITARTRATE 5 ML: 10; .01 INJECTION, SOLUTION INFILTRATION; PERINEURAL at 09:04

## 2024-03-05 RX ADMIN — SODIUM CHLORIDE, PRESERVATIVE FREE 400 UNITS: 5 INJECTION INTRAVENOUS at 09:12

## 2024-03-05 RX ADMIN — Medication 2 L/MIN: at 08:46

## 2024-03-05 ASSESSMENT — PAIN - FUNCTIONAL ASSESSMENT
PAIN_FUNCTIONAL_ASSESSMENT: 0-10

## 2024-03-05 ASSESSMENT — PAIN SCALES - GENERAL
PAINLEVEL_OUTOF10: 0 - NO PAIN

## 2024-03-05 NOTE — POST-PROCEDURE NOTE
Interventional Radiology Brief Postprocedure Note    Attending: Nolan Chun MD      Assistant: none    Diagnosis: lymphoma    Description of procedure: port placement     Anesthesia:  Local, mod sed    Complications: None    Estimated Blood Loss: none    No specimens collected      See detailed result report with images in PACS.    The patient tolerated the procedure well without incident or complication and is in stable condition.

## 2024-03-05 NOTE — DISCHARGE INSTRUCTIONS
Keep incision clean and dry.  Remove dressing in 48hrs.  Do not get incision site wet until edges appear healed, approximately 7 days.  Do not get into any standing water for 1 week.  Do not drive or operate any machinery for 24hrs..  Do not drink any alcohol for 24hrs.   Do not make any important decisions for 24hrs.    Patient Signature___________________________________________Date_______________    RN Signature_______________________________________________Date_______________

## 2024-03-05 NOTE — PRE-PROCEDURE NOTE
Interventional Radiology Preprocedure Note    Indication for procedure: Diagnoses of Lymphoma (CMS/HCC) and Diffuse large B-cell lymphoma, unspecified body region (CMS/HCC) were pertinent to this visit.    Relevant review of systems: NA    Relevant Labs:   Lab Results   Component Value Date    CREATININE 1.09 (H) 02/23/2024    EGFR 53 (L) 02/23/2024    INR 1.1 02/14/2024    PROTIME 11.4 02/14/2024       Planned Sedation/Anesthesia: Moderate    Airway assessment: normal    Directed physical examination:    RRR  CTA    Mallampati: II (hard and soft palate, upper portion of tonsils anduvula visible)    ASA Score: ASA 2 - Patient with mild systemic disease with no functional limitations    Benefits, risks and alternatives of procedure and planned sedation have been discussed with the patient and/or their representative. All questions answered and they agree to proceed.

## 2024-03-07 ENCOUNTER — INFUSION (OUTPATIENT)
Dept: HEMATOLOGY/ONCOLOGY | Facility: CLINIC | Age: 74
End: 2024-03-07
Payer: MEDICARE

## 2024-03-07 ENCOUNTER — SOCIAL WORK (OUTPATIENT)
Dept: CASE MANAGEMENT | Facility: HOSPITAL | Age: 74
End: 2024-03-07
Payer: MEDICARE

## 2024-03-07 ENCOUNTER — NUTRITION (OUTPATIENT)
Dept: HEMATOLOGY/ONCOLOGY | Facility: CLINIC | Age: 74
End: 2024-03-07
Payer: MEDICARE

## 2024-03-07 ENCOUNTER — OFFICE VISIT (OUTPATIENT)
Dept: HEMATOLOGY/ONCOLOGY | Facility: CLINIC | Age: 74
End: 2024-03-07
Payer: MEDICARE

## 2024-03-07 VITALS
TEMPERATURE: 97.3 F | WEIGHT: 192.79 LBS | OXYGEN SATURATION: 94 % | BODY MASS INDEX: 33.09 KG/M2 | DIASTOLIC BLOOD PRESSURE: 63 MMHG | RESPIRATION RATE: 18 BRPM | HEART RATE: 66 BPM | SYSTOLIC BLOOD PRESSURE: 132 MMHG

## 2024-03-07 VITALS — HEIGHT: 64 IN | WEIGHT: 192.79 LBS | BODY MASS INDEX: 32.91 KG/M2

## 2024-03-07 DIAGNOSIS — C83.30 DIFFUSE LARGE B-CELL LYMPHOMA, UNSPECIFIED BODY REGION (MULTI): ICD-10-CM

## 2024-03-07 DIAGNOSIS — C83.30 DIFFUSE LARGE B-CELL LYMPHOMA, UNSPECIFIED BODY REGION (MULTI): Primary | ICD-10-CM

## 2024-03-07 DIAGNOSIS — C85.10 B-CELL LYMPHOMA, UNSPECIFIED B-CELL LYMPHOMA TYPE, UNSPECIFIED BODY REGION (MULTI): Primary | ICD-10-CM

## 2024-03-07 DIAGNOSIS — C85.10 B-CELL LYMPHOMA, UNSPECIFIED B-CELL LYMPHOMA TYPE, UNSPECIFIED BODY REGION (MULTI): ICD-10-CM

## 2024-03-07 LAB
ALBUMIN SERPL BCP-MCNC: 3.6 G/DL (ref 3.4–5)
ALP SERPL-CCNC: 140 U/L (ref 33–136)
ALT SERPL W P-5'-P-CCNC: 19 U/L (ref 7–45)
ANION GAP SERPL CALC-SCNC: 14 MMOL/L (ref 10–20)
AST SERPL W P-5'-P-CCNC: 16 U/L (ref 9–39)
BASOPHILS # BLD AUTO: 0.01 X10*3/UL (ref 0–0.1)
BASOPHILS NFR BLD AUTO: 0.1 %
BILIRUB SERPL-MCNC: 0.7 MG/DL (ref 0–1.2)
BUN SERPL-MCNC: 19 MG/DL (ref 6–23)
CALCIUM SERPL-MCNC: 9.9 MG/DL (ref 8.6–10.3)
CHLORIDE SERPL-SCNC: 97 MMOL/L (ref 98–107)
CO2 SERPL-SCNC: 29 MMOL/L (ref 21–32)
CREAT SERPL-MCNC: 1.03 MG/DL (ref 0.5–1.05)
EGFRCR SERPLBLD CKD-EPI 2021: 57 ML/MIN/1.73M*2
EOSINOPHIL # BLD AUTO: 0.05 X10*3/UL (ref 0–0.4)
EOSINOPHIL NFR BLD AUTO: 0.4 %
ERYTHROCYTE [DISTWIDTH] IN BLOOD BY AUTOMATED COUNT: 14.9 % (ref 11.5–14.5)
GLUCOSE SERPL-MCNC: 115 MG/DL (ref 74–99)
HCT VFR BLD AUTO: 37.5 % (ref 36–46)
HGB BLD-MCNC: 11.7 G/DL (ref 12–16)
IMM GRANULOCYTES # BLD AUTO: 0.03 X10*3/UL (ref 0–0.5)
IMM GRANULOCYTES NFR BLD AUTO: 0.3 % (ref 0–0.9)
LYMPHOCYTES # BLD AUTO: 0.86 X10*3/UL (ref 0.8–3)
LYMPHOCYTES NFR BLD AUTO: 7.4 %
MCH RBC QN AUTO: 27 PG (ref 26–34)
MCHC RBC AUTO-ENTMCNC: 31.2 G/DL (ref 32–36)
MCV RBC AUTO: 86 FL (ref 80–100)
MONOCYTES # BLD AUTO: 0.29 X10*3/UL (ref 0.05–0.8)
MONOCYTES NFR BLD AUTO: 2.5 %
NEUTROPHILS # BLD AUTO: 10.35 X10*3/UL (ref 1.6–5.5)
NEUTROPHILS NFR BLD AUTO: 89.3 %
PLATELET # BLD AUTO: 245 X10*3/UL (ref 150–450)
POTASSIUM SERPL-SCNC: 3.7 MMOL/L (ref 3.5–5.3)
PROT SERPL-MCNC: 7.2 G/DL (ref 6.4–8.2)
RBC # BLD AUTO: 4.34 X10*6/UL (ref 4–5.2)
SODIUM SERPL-SCNC: 136 MMOL/L (ref 136–145)
WBC # BLD AUTO: 11.6 X10*3/UL (ref 4.4–11.3)

## 2024-03-07 PROCEDURE — 2500000004 HC RX 250 GENERAL PHARMACY W/ HCPCS (ALT 636 FOR OP/ED): Performed by: INTERNAL MEDICINE

## 2024-03-07 PROCEDURE — 96367 TX/PROPH/DG ADDL SEQ IV INF: CPT

## 2024-03-07 PROCEDURE — 99215 OFFICE O/P EST HI 40 MIN: CPT | Mod: 25 | Performed by: INTERNAL MEDICINE

## 2024-03-07 PROCEDURE — 96375 TX/PRO/DX INJ NEW DRUG ADDON: CPT | Mod: INF

## 2024-03-07 PROCEDURE — 96377 APPLICATON ON-BODY INJECTOR: CPT

## 2024-03-07 PROCEDURE — 96413 CHEMO IV INFUSION 1 HR: CPT

## 2024-03-07 PROCEDURE — 1036F TOBACCO NON-USER: CPT | Performed by: INTERNAL MEDICINE

## 2024-03-07 PROCEDURE — 99215 OFFICE O/P EST HI 40 MIN: CPT | Performed by: INTERNAL MEDICINE

## 2024-03-07 PROCEDURE — 96417 CHEMO IV INFUS EACH ADDL SEQ: CPT

## 2024-03-07 PROCEDURE — 2500000001 HC RX 250 WO HCPCS SELF ADMINISTERED DRUGS (ALT 637 FOR MEDICARE OP): Performed by: INTERNAL MEDICINE

## 2024-03-07 PROCEDURE — 3075F SYST BP GE 130 - 139MM HG: CPT | Performed by: INTERNAL MEDICINE

## 2024-03-07 PROCEDURE — 3078F DIAST BP <80 MM HG: CPT | Performed by: INTERNAL MEDICINE

## 2024-03-07 PROCEDURE — 1126F AMNT PAIN NOTED NONE PRSNT: CPT | Performed by: INTERNAL MEDICINE

## 2024-03-07 PROCEDURE — 96411 CHEMO IV PUSH ADDL DRUG: CPT

## 2024-03-07 PROCEDURE — 1159F MED LIST DOCD IN RCRD: CPT | Performed by: INTERNAL MEDICINE

## 2024-03-07 PROCEDURE — 96372 THER/PROPH/DIAG INJ SC/IM: CPT | Performed by: INTERNAL MEDICINE

## 2024-03-07 PROCEDURE — 85025 COMPLETE CBC W/AUTO DIFF WBC: CPT | Performed by: INTERNAL MEDICINE

## 2024-03-07 PROCEDURE — 96415 CHEMO IV INFUSION ADDL HR: CPT

## 2024-03-07 PROCEDURE — 80053 COMPREHEN METABOLIC PANEL: CPT | Performed by: INTERNAL MEDICINE

## 2024-03-07 RX ORDER — DOXORUBICIN HYDROCHLORIDE 2 MG/ML
50 INJECTION, SOLUTION INTRAVENOUS ONCE
Status: COMPLETED | OUTPATIENT
Start: 2024-03-07 | End: 2024-03-07

## 2024-03-07 RX ORDER — ALBUTEROL SULFATE 0.83 MG/ML
3 SOLUTION RESPIRATORY (INHALATION) AS NEEDED
Status: CANCELLED | OUTPATIENT
Start: 2024-05-09

## 2024-03-07 RX ORDER — PROCHLORPERAZINE MALEATE 5 MG
10 TABLET ORAL EVERY 6 HOURS PRN
Status: CANCELLED | OUTPATIENT
Start: 2024-04-18

## 2024-03-07 RX ORDER — DOXORUBICIN HYDROCHLORIDE 2 MG/ML
50 INJECTION, SOLUTION INTRAVENOUS ONCE
Status: CANCELLED | OUTPATIENT
Start: 2024-05-09

## 2024-03-07 RX ORDER — EPINEPHRINE 0.3 MG/.3ML
0.3 INJECTION SUBCUTANEOUS EVERY 5 MIN PRN
Status: DISCONTINUED | OUTPATIENT
Start: 2024-03-07 | End: 2024-03-07 | Stop reason: HOSPADM

## 2024-03-07 RX ORDER — DIPHENHYDRAMINE HYDROCHLORIDE 50 MG/ML
50 INJECTION INTRAMUSCULAR; INTRAVENOUS AS NEEDED
Status: CANCELLED | OUTPATIENT
Start: 2024-05-09

## 2024-03-07 RX ORDER — DIPHENHYDRAMINE HCL 25 MG
50 CAPSULE ORAL ONCE
Status: COMPLETED | OUTPATIENT
Start: 2024-03-07 | End: 2024-03-07

## 2024-03-07 RX ORDER — DIPHENHYDRAMINE HCL 50 MG
50 CAPSULE ORAL ONCE
Status: CANCELLED | OUTPATIENT
Start: 2024-05-09

## 2024-03-07 RX ORDER — PALONOSETRON 0.05 MG/ML
0.25 INJECTION, SOLUTION INTRAVENOUS ONCE
Status: CANCELLED | OUTPATIENT
Start: 2024-05-09

## 2024-03-07 RX ORDER — DOXORUBICIN HYDROCHLORIDE 2 MG/ML
50 INJECTION, SOLUTION INTRAVENOUS ONCE
Status: CANCELLED | OUTPATIENT
Start: 2024-04-18

## 2024-03-07 RX ORDER — PROCHLORPERAZINE EDISYLATE 5 MG/ML
10 INJECTION INTRAMUSCULAR; INTRAVENOUS EVERY 6 HOURS PRN
Status: CANCELLED | OUTPATIENT
Start: 2024-05-09

## 2024-03-07 RX ORDER — ALBUTEROL SULFATE 0.83 MG/ML
3 SOLUTION RESPIRATORY (INHALATION) AS NEEDED
Status: DISCONTINUED | OUTPATIENT
Start: 2024-03-07 | End: 2024-03-07 | Stop reason: HOSPADM

## 2024-03-07 RX ORDER — PALONOSETRON 0.05 MG/ML
0.25 INJECTION, SOLUTION INTRAVENOUS ONCE
Status: CANCELLED | OUTPATIENT
Start: 2024-04-18

## 2024-03-07 RX ORDER — PALONOSETRON 0.05 MG/ML
0.25 INJECTION, SOLUTION INTRAVENOUS ONCE
Status: COMPLETED | OUTPATIENT
Start: 2024-03-07 | End: 2024-03-07

## 2024-03-07 RX ORDER — DIPHENHYDRAMINE HCL 50 MG
50 CAPSULE ORAL ONCE
Status: CANCELLED | OUTPATIENT
Start: 2024-04-18

## 2024-03-07 RX ORDER — ACETAMINOPHEN 325 MG/1
650 TABLET ORAL ONCE
Status: COMPLETED | OUTPATIENT
Start: 2024-03-07 | End: 2024-03-07

## 2024-03-07 RX ORDER — HEPARIN SODIUM,PORCINE/PF 10 UNIT/ML
50 SYRINGE (ML) INTRAVENOUS AS NEEDED
Status: CANCELLED | OUTPATIENT
Start: 2024-03-07

## 2024-03-07 RX ORDER — FAMOTIDINE 10 MG/ML
20 INJECTION INTRAVENOUS ONCE AS NEEDED
Status: DISCONTINUED | OUTPATIENT
Start: 2024-03-07 | End: 2024-03-07 | Stop reason: HOSPADM

## 2024-03-07 RX ORDER — LIDOCAINE AND PRILOCAINE 25; 25 MG/G; MG/G
CREAM TOPICAL
Qty: 30 G | Refills: 2 | Status: SHIPPED | OUTPATIENT
Start: 2024-03-07 | End: 2024-03-07

## 2024-03-07 RX ORDER — PROCHLORPERAZINE MALEATE 10 MG
10 TABLET ORAL EVERY 6 HOURS PRN
Status: DISCONTINUED | OUTPATIENT
Start: 2024-03-07 | End: 2024-03-07 | Stop reason: HOSPADM

## 2024-03-07 RX ORDER — PROCHLORPERAZINE MALEATE 5 MG
10 TABLET ORAL EVERY 6 HOURS PRN
Status: CANCELLED | OUTPATIENT
Start: 2024-05-09

## 2024-03-07 RX ORDER — PROCHLORPERAZINE EDISYLATE 5 MG/ML
10 INJECTION INTRAMUSCULAR; INTRAVENOUS EVERY 6 HOURS PRN
Status: DISCONTINUED | OUTPATIENT
Start: 2024-03-07 | End: 2024-03-07 | Stop reason: HOSPADM

## 2024-03-07 RX ORDER — DIPHENHYDRAMINE HYDROCHLORIDE 50 MG/ML
50 INJECTION INTRAMUSCULAR; INTRAVENOUS AS NEEDED
Status: CANCELLED | OUTPATIENT
Start: 2024-04-18

## 2024-03-07 RX ORDER — ACETAMINOPHEN 325 MG/1
650 TABLET ORAL ONCE
Status: CANCELLED | OUTPATIENT
Start: 2024-04-18

## 2024-03-07 RX ORDER — EPINEPHRINE 0.3 MG/.3ML
0.3 INJECTION SUBCUTANEOUS EVERY 5 MIN PRN
Status: CANCELLED | OUTPATIENT
Start: 2024-04-18

## 2024-03-07 RX ORDER — ACETAMINOPHEN 325 MG/1
650 TABLET ORAL ONCE
Status: CANCELLED | OUTPATIENT
Start: 2024-05-09

## 2024-03-07 RX ORDER — FAMOTIDINE 10 MG/ML
20 INJECTION INTRAVENOUS ONCE AS NEEDED
Status: CANCELLED | OUTPATIENT
Start: 2024-05-09

## 2024-03-07 RX ORDER — FAMOTIDINE 10 MG/ML
20 INJECTION INTRAVENOUS ONCE AS NEEDED
Status: CANCELLED | OUTPATIENT
Start: 2024-04-18

## 2024-03-07 RX ORDER — DIPHENHYDRAMINE HYDROCHLORIDE 50 MG/ML
50 INJECTION INTRAMUSCULAR; INTRAVENOUS AS NEEDED
Status: DISCONTINUED | OUTPATIENT
Start: 2024-03-07 | End: 2024-03-07 | Stop reason: HOSPADM

## 2024-03-07 RX ORDER — HEPARIN 100 UNIT/ML
500 SYRINGE INTRAVENOUS AS NEEDED
Status: CANCELLED | OUTPATIENT
Start: 2024-03-07

## 2024-03-07 RX ORDER — HEPARIN 100 UNIT/ML
500 SYRINGE INTRAVENOUS AS NEEDED
Status: DISCONTINUED | OUTPATIENT
Start: 2024-03-07 | End: 2024-03-07 | Stop reason: HOSPADM

## 2024-03-07 RX ORDER — PROCHLORPERAZINE EDISYLATE 5 MG/ML
10 INJECTION INTRAMUSCULAR; INTRAVENOUS EVERY 6 HOURS PRN
Status: CANCELLED | OUTPATIENT
Start: 2024-04-18

## 2024-03-07 RX ORDER — ALBUTEROL SULFATE 0.83 MG/ML
3 SOLUTION RESPIRATORY (INHALATION) AS NEEDED
Status: CANCELLED | OUTPATIENT
Start: 2024-04-18

## 2024-03-07 RX ORDER — EPINEPHRINE 0.3 MG/.3ML
0.3 INJECTION SUBCUTANEOUS EVERY 5 MIN PRN
Status: CANCELLED | OUTPATIENT
Start: 2024-05-09

## 2024-03-07 RX ADMIN — HEPARIN 500 UNITS: 100 SYRINGE at 16:28

## 2024-03-07 RX ADMIN — PALONOSETRON 250 MCG: 0.05 INJECTION, SOLUTION INTRAVENOUS at 09:52

## 2024-03-07 RX ADMIN — ACETAMINOPHEN 650 MG: 325 TABLET ORAL at 12:01

## 2024-03-07 RX ADMIN — VINCRISTINE SULFATE 2 MG: 1 INJECTION, SOLUTION INTRAVENOUS at 11:11

## 2024-03-07 RX ADMIN — SODIUM CHLORIDE 753.75 MG: 9 INJECTION, SOLUTION INTRAVENOUS at 12:35

## 2024-03-07 RX ADMIN — FOSAPREPITANT 150 MG: 150 INJECTION, POWDER, LYOPHILIZED, FOR SOLUTION INTRAVENOUS at 09:52

## 2024-03-07 RX ADMIN — DOXORUBICIN HYDROCHLORIDE 100 MG: 2 INJECTION, SOLUTION INTRAVENOUS at 10:43

## 2024-03-07 RX ADMIN — PEGFILGRASTIM 6 MG: KIT SUBCUTANEOUS at 14:10

## 2024-03-07 RX ADMIN — DIPHENHYDRAMINE HYDROCHLORIDE 50 MG: 25 CAPSULE ORAL at 12:01

## 2024-03-07 RX ADMIN — CYCLOPHOSPHAMIDE 1500 MG: 1 INJECTION, POWDER, FOR SOLUTION INTRAVENOUS; ORAL at 11:51

## 2024-03-07 ASSESSMENT — COLUMBIA-SUICIDE SEVERITY RATING SCALE - C-SSRS
2. HAVE YOU ACTUALLY HAD ANY THOUGHTS OF KILLING YOURSELF?: NO
1. IN THE PAST MONTH, HAVE YOU WISHED YOU WERE DEAD OR WISHED YOU COULD GO TO SLEEP AND NOT WAKE UP?: NO
6. HAVE YOU EVER DONE ANYTHING, STARTED TO DO ANYTHING, OR PREPARED TO DO ANYTHING TO END YOUR LIFE?: NO

## 2024-03-07 ASSESSMENT — PATIENT HEALTH QUESTIONNAIRE - PHQ9
1. LITTLE INTEREST OR PLEASURE IN DOING THINGS: NOT AT ALL
SUM OF ALL RESPONSES TO PHQ9 QUESTIONS 1 AND 2: 0
2. FEELING DOWN, DEPRESSED OR HOPELESS: NOT AT ALL

## 2024-03-07 ASSESSMENT — ENCOUNTER SYMPTOMS
EYES NEGATIVE: 1
CONSTITUTIONAL NEGATIVE: 1
RESPIRATORY NEGATIVE: 1

## 2024-03-07 ASSESSMENT — PAIN SCALES - GENERAL: PAINLEVEL: 0-NO PAIN

## 2024-03-07 NOTE — PROGRESS NOTES
Pt tolerated treatment without incident. Denies questions, concerns, or comments at this time. Discharged home with steady gait.

## 2024-03-07 NOTE — PATIENT INSTRUCTIONS
Today you visited with your Oncologist.  Your recent labs were discussed and questions were answered.  Your current treatment regimen was discussed and the plan going forward was also discussed.  Scheduling orders were placed to reflect this conversation.  Please call our office for any questions that may arise after leaving today.   [phone]    Review your schedule upon leaving every infusion visit.  Remember you will no longer see lab visits noted separately on your schedule.

## 2024-03-07 NOTE — PROGRESS NOTES
Ritux Rates    22ml/hr for 11ml  43ml/hr for 22ml  65ml/hr for 32ml  86ml/hr for 43ml  108ml/hr for 54ml  129ml/hr for 65ml  151ml/hr for 75ml  172ml/hr for 25ml

## 2024-03-07 NOTE — PROGRESS NOTES
Patient ID: eKllie Rae is a 73 y.o. female.  Referring Physician: Contreras Rowell MD  [id number] [place] [address]  Primary Care Provider: Douglas Yañez MD  Visit Type:  {Visit Type:88355}     {Telehealth Consent:[id number]}    Subjective    HPI    Review of Systems - Oncology     Objective   BSA: There is no height or weight on file to calculate BSA.  There were no vitals taken for this visit.     has a past medical history of GERD (gastroesophageal reflux disease), Gout, Hyperlipidemia, Hypertension, Hypothyroidism, and Sleep apnea.   has a past surgical history that includes Appendectomy; Colonoscopy; and Cataract extraction (Bilateral).  No family history on file.  Oncology History   Lymphoma (CMS/HCC)   2/23/2024 Initial Diagnosis    Lymphoma (CMS/HCC)     3/7/2024 -  Chemotherapy    R-CHOP (Cyclophosphamide / DOXOrubicin / VinCRIStine / PredniSONE) + RiTUXimab, 21 Day Cycles         Kellie Rae  reports that she quit smoking about 25 years ago. Her smoking use included cigarettes. She started smoking about 56 years ago. She has a 31.00 pack-year smoking history. She has never used smokeless tobacco.  She  reports no history of alcohol use.  She  reports no history of drug use.    Physical Exam    WBC   Date/Time Value Ref Range Status   02/23/2024 10:57 AM 12.0 (H) 4.4 - 11.3 x10*3/uL Final   02/14/2024 07:40 AM 9.3 4.4 - 11.3 x10*3/uL Final   09/24/2019 09:55 AM 9.0 4.5 - 11.0 K/UL Final   09/10/2019 10:30 AM 7.6 4.5 - 11.0 K/UL Final   08/27/2019 09:53 AM 7.8 4.5 - 11.0 K/UL Final     nRBC   Date Value Ref Range Status   02/14/2024 0.0 0.0 - 0.0 /100 WBCs Final   09/24/2019 0 0 /100 WBC Final   09/10/2019 0 0 /100 WBC Final   08/27/2019 0 0 /100 WBC Final     RBC   Date Value Ref Range Status   02/23/2024 4.49 4.00 - 5.20 x10*6/uL Final   02/14/2024 4.60 4.00 - 5.20 x10*6/uL Final   09/24/2019 5.02 (H) 4.0 - 4.9 M/UL Final   09/10/2019 5.26 (H) 4.0 - 4.9 M/UL Final    08/27/2019 4.92 (H) 4.0 - 4.9 M/UL Final     Hemoglobin   Date Value Ref Range Status   02/23/2024 12.0 12.0 - 16.0 g/dL Final   02/14/2024 12.8 12.0 - 16.0 g/dL Final   09/24/2019 14.9 12.0 - 15.0 GM/DL Final   09/10/2019 15.8 (H) 12.0 - 15.0 GM/DL Final   08/27/2019 14.9 12.0 - 15.0 GM/DL Final     Hematocrit   Date Value Ref Range Status   02/23/2024 39.1 36.0 - 46.0 % Final   02/14/2024 39.9 36.0 - 46.0 % Final   09/24/2019 47.7 (H) 36 - 44 % Final   09/10/2019 50.1 (H) 36 - 44 % Final   08/27/2019 46.8 (H) 36 - 44 % Final     MCV   Date/Time Value Ref Range Status   02/23/2024 10:57 AM 87 80 - 100 fL Final   02/14/2024 07:40 AM 87 80 - 100 fL Final   09/24/2019 09:55 AM 95.0 80 - 100 FL Final   09/10/2019 10:30 AM 95.2 80 - 100 FL Final   08/27/2019 09:53 AM 95.1 80 - 100 FL Final     MCH   Date/Time Value Ref Range Status   02/23/2024 10:57 AM 26.7 26.0 - 34.0 pg Final   02/14/2024 07:40 AM 27.8 26.0 - 34.0 pg Final   09/24/2019 09:55 AM 29.7 26 - 34 PG Final     MCHC   Date/Time Value Ref Range Status   02/23/2024 10:57 AM 30.7 (L) 32.0 - 36.0 g/dL Final   02/14/2024 07:40 AM 32.1 32.0 - 36.0 g/dL Final   09/24/2019 09:55 AM 31.2 31 - 37 % Final   09/10/2019 10:30 AM 31.5 31 - 37 % Final   08/27/2019 09:53 AM 31.8 31 - 37 % Final     RDW   Date/Time Value Ref Range Status   02/23/2024 10:57 AM 14.6 (H) 11.5 - 14.5 % Final   02/14/2024 07:40 AM 14.7 (H) 11.5 - 14.5 % Final     Platelets   Date/Time Value Ref Range Status   02/23/2024 10:57  150 - 450 x10*3/uL Final   02/14/2024 07:40  150 - 450 x10*3/uL Final   09/24/2019 09:55  150 - 450 K/UL Final   09/10/2019 10:30  150 - 450 K/UL Final   08/27/2019 09:53  150 - 450 K/UL Final     MPV   Date/Time Value Ref Range Status   09/24/2019 09:55 AM 10.2 7.0 - 12.6 CU Final   09/10/2019 10:30 AM 10.6 7.0 - 12.6 CU Final   08/27/2019 09:53 AM 10.4 7.0 - 12.6 CU Final     Neutrophils %   Date/Time Value Ref Range Status   02/23/2024  10:57 AM 79.3 40.0 - 80.0 % Final     Immature Granulocytes %, Automated   Date/Time Value Ref Range Status   02/23/2024 10:57 AM 0.6 0.0 - 0.9 % Final     Comment:     Immature Granulocyte Count (IG) includes promyelocytes, myelocytes and metamyelocytes but does not include bands. Percent differential counts (%) should be interpreted in the context of the absolute cell counts (cells/UL).     Lymphocytes %   Date/Time Value Ref Range Status   02/23/2024 10:57 AM 12.0 13.0 - 44.0 % Final   09/24/2019 09:55 AM 29.90 20 - 40 % Final   09/10/2019 10:30 AM 29.30 20 - 40 % Final   08/27/2019 09:53 AM 29.90 20 - 40 % Final     Monocytes %   Date/Time Value Ref Range Status   02/23/2024 10:57 AM 6.7 2.0 - 10.0 % Final   09/24/2019 09:55 AM 8.30 (H) 0 - 8 % Final   09/10/2019 10:30 AM 8.70 (H) 0 - 8 % Final   08/27/2019 09:53 AM 9.20 (H) 0 - 8 % Final     Eosinophils %   Date/Time Value Ref Range Status   02/23/2024 10:57 AM 1.3 0.0 - 6.0 % Final     Basophils %   Date/Time Value Ref Range Status   02/23/2024 10:57 AM 0.1 0.0 - 2.0 % Final   09/24/2019 09:55 AM 0.70 0 - 1 % Final   09/10/2019 10:30 AM 0.70 0 - 1 % Final   08/27/2019 09:53 AM 0.60 0 - 1 % Final     Neutrophils Absolute   Date/Time Value Ref Range Status   02/23/2024 10:57 AM 9.48 (H) 1.60 - 5.50 x10*3/uL Final     Comment:     Percent differential counts (%) should be interpreted in the context of the absolute cell counts (cells/uL).   09/24/2019 09:55 AM 5.39 1.8 - 7.7 K/UL Final   09/10/2019 10:30 AM 4.52 1.8 - 7.7 K/UL Final   08/27/2019 09:53 AM 4.59 1.8 - 7.7 K/UL Final     Immature Granulocytes Absolute, Automated   Date/Time Value Ref Range Status   02/23/2024 10:57 AM 0.07 0.00 - 0.50 x10*3/uL Final   09/24/2019 09:55 AM 0.01 0.0 - 0.1 K/UL Final   09/10/2019 10:30 AM 0.02 0.0 - 0.1 K/UL Final   08/27/2019 09:53 AM 0.02 0.0 - 0.1 K/UL Final     Lymphocytes Absolute   Date/Time Value Ref Range Status   02/23/2024 10:57 AM 1.44 0.80 - 3.00 x10*3/uL  "Final   09/24/2019 09:55 AM 2.70 1.2 - 3.2 K/UL Final   09/10/2019 10:30 AM 2.22 1.2 - 3.2 K/UL Final   08/27/2019 09:53 AM 2.34 1.2 - 3.2 K/UL Final     Monocytes Absolute   Date/Time Value Ref Range Status   02/23/2024 10:57 AM 0.80 0.05 - 0.80 x10*3/uL Final   09/24/2019 09:55 AM 0.75 0 - 0.8 K/UL Final   09/10/2019 10:30 AM 0.66 0 - 0.8 K/UL Final   08/27/2019 09:53 AM 0.72 0 - 0.8 K/UL Final     Eosinophils Absolute   Date/Time Value Ref Range Status   02/23/2024 10:57 AM 0.16 0.00 - 0.40 x10*3/uL Final   09/24/2019 09:55 AM 0.11 0 - 0.45 K/UL Final   09/10/2019 10:30 AM 0.11 0 - 0.45 K/UL Final   08/27/2019 09:53 AM 0.11 0 - 0.45 K/UL Final     Basophils Absolute   Date/Time Value Ref Range Status   02/23/2024 10:57 AM 0.01 0.00 - 0.10 x10*3/uL Final   09/24/2019 09:55 AM 0.06 0.00 - 0.22 K/UL Final   09/10/2019 10:30 AM 0.05 0.00 - 0.22 K/UL Final   08/27/2019 09:53 AM 0.05 0.00 - 0.22 K/UL Final       No components found for: \"PT\"  No results found for: \"APTT\"    Assessment/Plan           {Assess/PlanSmartLinks:46365}         INF 07 GEAUGA                         "

## 2024-03-07 NOTE — PATIENT INSTRUCTIONS
You received your first round of R-CHOP chemo today! Use a calendar to abner what days you feel good or bad so that you can predict them in your next cycle of chemo. We gave you a lot of medications that have a lot of side effects, but you can expect to be nauseous and fatigued the next few days. You may also notice some hair loss. We provided a catalogue and you can also find nice scarves and caps on [employer], [employer], or other online retailers. You can refer to your handouts for a more comprehensive list of side effects, but please feel free to contact us with any questions. For the next 7 days (until 3/14) your body fluids may contain chemo. It will be important to wash your hands thoroughly after using the bathroom, closing the lid before flushing, and washing soiled clothing separately if necessary.     Your Neulasta Onpro was placed at 2:15pm. Medication delivery will start at 5:15pm on 3/8 and will take 45 minutes to complete, during this time the green light will flash. The delivery should be completed by 6:30pm on 3/8. Once dose delivery is complete, you will hear one long beep and the light will turn solid green. To confirm dose delivery, please check the black line on the injector to make sure it is on empty. To remove, gently peel the tape off and dispose of the injector in a sharps-safe disposal.     Check the status light occasionally to make sure it flashes green. If at any time you notice that the light is red, please call your healthcare provider at [phone].    One of the most common side effects of neulasta is bone pain. To help minimize discomfort, take over the counter Claritin once a day.

## 2024-03-07 NOTE — PROGRESS NOTES
Patient was educated on new drugs Doxorubicin, Vincristine, Cyclophosphamide, and Rituximab. Patient understands why they are receiving the drug and wished to proceed with administration. Patient was educated and given information on this drug. Drug information was reviewed and patient has no further questions. Patient understands the side effects of the drug and knows to watch for possible signs of reaction to this new drug. Patient knows if they have signs at home of a drug reaction, and that they should call the provider immediately. If the reaction involves any throat discomfort or shortness of breath, they are to report to the nearest ED or call 911. Patient sts they understand the information that was taught and was able to verbally explain back what they learned. Patient has the phone number of the clinic to call if the have any further questions. Red bag provided.

## 2024-03-07 NOTE — PROGRESS NOTES
"NUTRITION Assessment NOTE    Nutrition Assessment     Reason for Visit:  Kellie Rae is a 73 y.o. female who presents for diffuse large B cell lymphoma.   Hx: HTN, HLD  Current tx: RCHOP  Started: 3/7/24    Nutrition consult for weight loss.  Visited with pt today during treatment.     Lab Results   Component Value Date/Time    GLUCOSE 115 (H) 03/07/2024 0857     03/07/2024 0857    K 3.7 03/07/2024 0857    CL 97 (L) 03/07/2024 0857    CO2 29 03/07/2024 0857    ANIONGAP 14 03/07/2024 0857    BUN 19 03/07/2024 0857    CREATININE 1.03 03/07/2024 0857    EGFR 57 (L) 03/07/2024 0857    CALCIUM 9.9 03/07/2024 0857    ALBUMIN 3.6 03/07/2024 0857    ALKPHOS 140 (H) 03/07/2024 0857    PROT 7.2 03/07/2024 0857    AST 16 03/07/2024 0857    BILITOT 0.7 03/07/2024 0857    ALT 19 03/07/2024 0857     No results found for: \"VITD25\"    Anthropometrics:  Anthropometrics  Height: 162.6 cm (5' 4.02\")  Weight: 87.5 kg (192 lb 12.7 oz)  BMI (Calculated): 33.08  IBW/kg (Dietitian Calculated): 54.5 kg  Percent of IBW: 160 %  Adjusted Body Weight (kg): 63 kg  Weight Change  Weight History / % Weight Change: 14.2% loss in the last year, 4.6% loss in the last month; conerning though not significant at this time  Significant Weight Loss: No        Wt Readings from Last 10 Encounters:   03/07/24 87.5 kg (192 lb 12.7 oz)   03/07/24 87.5 kg (192 lb 12.7 oz)   03/05/24 86.2 kg (190 lb)   02/23/24 89.1 kg (196 lb 6.9 oz)   02/12/24 90.4 kg (199 lb 4.7 oz)   02/02/24 91.7 kg (202 lb 0.8 oz)   02/08/22 102 kg (225 lb)   04/13/21 98.9 kg (218 lb)        Food And Nutrient Intake:  Food and Nutrient History  Food and Nutrient History: Skipping meals and smaller portions/ leaving food on plate; for the last few months. 30lbs. If not hungry can eat a small snack but not able to eat a meal.  Fluid Intake: Coffee 1-2 cups, occasional OJ, water ( ~18oz)  Food Allergy: nka  Food Intolerance: Lactose intolerant- some cheese, lactaid milk/ ice " "cream, no yogurt; chocolate causes severe pain  GI Symptoms: constipation, reflux (enema - about once a week; doesn't avoid anything)  GI Symptoms greater than 2 weeks: intermittent  Oral Problems: denies  Dentition: lower denture/partial, upper denture/partial     Food Intake  Meal 1: B= Doesn't typically have breakfast, just has coffee- lactaid milk in coffee  Meal 2: L = Bowl of cereal - frosted flakes; 2% lactaid milk  Meal 3: D = 2 normal slices of pizza; lemonade type drink  Snacks: Stopped snacking- doesn't snack now                                                        Nutrition Focused Physical Exam Findings:      Subcutaneous Fat Loss  Orbital Fat Pads: Well nourshed (slightly bulging fat pads)  Buccal Fat Pads: Well nourished (full, rounded cheeks)  Triceps: Defer  Ribs: Defer    Muscle Wasting  Temporalis: Well nourished (well-defined muscle)  Pectoralis (Clavicular Region): Defer  Deltoid/Trapezius: Defer  Interosseous: Well nourished (muscle bulges)  Trapezius/Infraspinatus/Supraspinatus (Scapular Region): Defer  Quadriceps: Defer  Gastrocnemius: Defer              Energy Needs  Calculated Energy Needs Using Equations  Height: 162.6 cm (5' 4.02\")  Weight Used for Equation Calculations: 63 kg (138 lb 14.2 oz) (adjusted weight)  [place]- [place] Equation (Overweight or Obese Patients): 1110  Estimated Energy Needs  Total Energy Estimated Needs (kCal): 1890 kCal  Total Estimated Energy Need per Day (kCal/kg): 30 kCal/kg  Estimated Fluid Needs  Total Fluid Estimated Needs (mL): 1890 mL  Total Fluid Estimated Needs (mL/kg): 30 mL/kg  Method for Estimating Needs: additional needs for diarrhea/vomiting  Estimated Protein Needs  Total Protein Estimated Needs (g): 75.6 g  Total Protein Estimated Needs (g/kg): 1.2 g/kg  Method for Estimating Needs: up to 88g/day        Nutrition Diagnosis   Malnutrition Diagnosis  Patient has Malnutrition Diagnosis: No    Nutrition Diagnosis  Patient has Nutrition Diagnosis: " Yes  Diagnosis Status (1): New  Nutrition Diagnosis 1: Predicted inadequate energy intake  Related to (1): potential for nutrition impact symptoms with treatment  As Evidenced by (1): pt undergoing chemotherapy for DLBCL.    Nutrition Interventions/Recommendations   Nutrition Prescription  Individualized Nutrition Prescription Provided for : Nutrition during cancer treatment, management of nutrition impact symptoms.    Food and Nutrition Delivery  Food and Nutrition Delivery  Meals & Snacks: Energy-modified diet, Protein-modified diet, Fluid-modified diet  Goals: Encouraged to incorporate higher calorie foods as often as possible, discussed maintaining weight through treatment; recommend protein source with each meal- discussed protein sources with gout. Discussed food safety - discussed food preferences as well. Discussed potential NIS and mgmt. Encouarged adequate hydration. Encouraged frequent meals, eat with 1 hour of waking and every 2-3 hrs.  Medical Food Supplement: Commercial beverage  Goals: Discussed lower lactose options- fairlife?  Feeding Assistance: Mouth care  Goals: Discussed use of sodium bicarb rinse, recipe provided    Nutrition Education  Nutrition Education  Nutrition Education Content: Content related nutrition education  Goals: High calorie high protein diet    Coordination of Care  Coordination of Nutrition Care by a Nutrition Professional  Collaboration and referral of nutrition care: Collaboration by nutrition professional with other providers    Patient Instructions   Handouts provided:   Eating hints booklet  Low purine diet (clarification on gout mgmt)  High calorie food list    Nutrition Monitoring and Evaluation   Food/Nutrient Related History Monitoring  Monitoring and Evaluation Plan: Energy intake, Fluid intake, Meal/snack pattern, Protein intake, Amount of food  Energy Intake: Estimated energy intake  Criteria: Meet at least 75% of caloric needs  Fluid Intake: Estimated fluid  intake  Criteria: Maintain hydration; 63+oz daily  Amount of Food: Medical food intake  Criteria: lower lactose options as able  Meal/Snack Pattern: Estimated meal and snack pattern  Criteria: 5-6 meals/snacks per day at least 50% of the time  Estimated protein intake: Estimated protein intake  Criteria: Meet at least 75% of protein needs orally  Body Composition/Growth/Weight History  Monitoring and Evaluation Plan: Weight  Weight: Measured weight  Criteria: Maintain weight          Time Spent  Prep time on day of patient encounter: 10 minutes  Time spent directly with patient, family or caregiver: 30 minutes  Additional Time Spent on Patient Care Activities: 5 minutes  Documentation Time: 20 minutes  Other Time Spent: 0 minutes  Total: 65 minutes

## 2024-03-07 NOTE — PATIENT INSTRUCTIONS
Handouts provided:   Eating hints booklet  Low purine diet (clarification on gout mgmt)  High calorie food list

## 2024-03-07 NOTE — PROGRESS NOTES
Patient ID: Kellie Rae is a 73 y.o. female.  Referring Physician: Contreras Rowell MD  [id number] [place] [address]  Primary Care Provider: Douglas Yañez MD  Visit Type:  Follow Up     Subjective    HPI  Referred by primary care doctor because of the results of CT chest abdomen and pelvis showing a soft tissue mass lesion within the mesentery with multiple retroperitoneal and mesenteric lymph and adenopathy.  Radiographically the findings could represent lymphoma.  I was able to elicit from had that his symptoms began about 5 years ago with pain in the abdomen and the back she subsequently was diagnosed with gout and it is not clear the etiology of the of the gout currently on allopurinol and colchicine.  She is also on nonsteroidals anti-inflammatory agents as well.  She was referred to medical oncology because a CT scan of the abdomen showed intra-abdominal lesions reminiscent of lymphadenopathy/Lymphoma.  Biopsy has been ordered to confirm the diagnosis.    FINAL DIAGNOSIS      A. SOFT TISSUE MASS, MESENTERY, BIOPSY:      --  DIFFUSE LARGE B CELL LYMPHOMA, FINAL CLASSIFICATION PENDING GENETIC STUDIES, SEE NOTE.          SUBTYPE BY PALMA CRITERIA:  NON GERMINAL CENTER CELL TYPE          MYC/BCL-2 EXPRESSION: NOT A DOUBLE EXPRESSOR (MYC-,BCL-2+)     Review of Systems   Constitutional: Negative.    HENT:  Negative.     Eyes: Negative.    Respiratory: Negative.          Objective   BSA: 1.99 meters squared  /63 (BP Location: Left arm, Patient Position: Sitting, BP Cuff Size: Large adult)   Pulse 66   Temp 36.3 °C (97.3 °F) (Temporal)   Resp 18   Wt 87.5 kg (192 lb 12.7 oz)   SpO2 94%   BMI 33.09 kg/m²      has a past medical history of GERD (gastroesophageal reflux disease), Gout, Hyperlipidemia, Hypertension, Hypothyroidism, and Sleep apnea.   has a past surgical history that includes Appendectomy; Colonoscopy; and Cataract extraction (Bilateral).  No family history on  file.  Oncology History   Lymphoma (CMS/HCC)   2/23/2024 Initial Diagnosis    Lymphoma (CMS/HCC)     3/7/2024 -  Chemotherapy    R-CHOP (Cyclophosphamide / DOXOrubicin / VinCRIStine / PredniSONE) + RiTUXimab, 21 Day Cycles         Kellie Rae  reports that she quit smoking about 25 years ago. Her smoking use included cigarettes. She started smoking about 56 years ago. She has a 31.00 pack-year smoking history. She has never used smokeless tobacco.  She  reports no history of alcohol use.  She  reports no history of drug use.    Physical Exam  Constitutional:       Appearance: Normal appearance.   HENT:      Head: Normocephalic and atraumatic.   Eyes:      Extraocular Movements: Extraocular movements intact.      Pupils: Pupils are equal, round, and reactive to light.   Neurological:      Mental Status: She is alert.         WBC   Date/Time Value Ref Range Status   02/23/2024 10:57 AM 12.0 (H) 4.4 - 11.3 x10*3/uL Final   02/14/2024 07:40 AM 9.3 4.4 - 11.3 x10*3/uL Final   09/24/2019 09:55 AM 9.0 4.5 - 11.0 K/UL Final   09/10/2019 10:30 AM 7.6 4.5 - 11.0 K/UL Final   08/27/2019 09:53 AM 7.8 4.5 - 11.0 K/UL Final     nRBC   Date Value Ref Range Status   02/14/2024 0.0 0.0 - 0.0 /100 WBCs Final   09/24/2019 0 0 /100 WBC Final   09/10/2019 0 0 /100 WBC Final   08/27/2019 0 0 /100 WBC Final     RBC   Date Value Ref Range Status   02/23/2024 4.49 4.00 - 5.20 x10*6/uL Final   02/14/2024 4.60 4.00 - 5.20 x10*6/uL Final   09/24/2019 5.02 (H) 4.0 - 4.9 M/UL Final   09/10/2019 5.26 (H) 4.0 - 4.9 M/UL Final   08/27/2019 4.92 (H) 4.0 - 4.9 M/UL Final     Hemoglobin   Date Value Ref Range Status   02/23/2024 12.0 12.0 - 16.0 g/dL Final   02/14/2024 12.8 12.0 - 16.0 g/dL Final   09/24/2019 14.9 12.0 - 15.0 GM/DL Final   09/10/2019 15.8 (H) 12.0 - 15.0 GM/DL Final   08/27/2019 14.9 12.0 - 15.0 GM/DL Final     Hematocrit   Date Value Ref Range Status   02/23/2024 39.1 36.0 - 46.0 % Final   02/14/2024 39.9 36.0 - 46.0 % Final    09/24/2019 47.7 (H) 36 - 44 % Final   09/10/2019 50.1 (H) 36 - 44 % Final   08/27/2019 46.8 (H) 36 - 44 % Final     MCV   Date/Time Value Ref Range Status   02/23/2024 10:57 AM 87 80 - 100 fL Final   02/14/2024 07:40 AM 87 80 - 100 fL Final   09/24/2019 09:55 AM 95.0 80 - 100 FL Final   09/10/2019 10:30 AM 95.2 80 - 100 FL Final   08/27/2019 09:53 AM 95.1 80 - 100 FL Final     MCH   Date/Time Value Ref Range Status   02/23/2024 10:57 AM 26.7 26.0 - 34.0 pg Final   02/14/2024 07:40 AM 27.8 26.0 - 34.0 pg Final   09/24/2019 09:55 AM 29.7 26 - 34 PG Final     MCHC   Date/Time Value Ref Range Status   02/23/2024 10:57 AM 30.7 (L) 32.0 - 36.0 g/dL Final   02/14/2024 07:40 AM 32.1 32.0 - 36.0 g/dL Final   09/24/2019 09:55 AM 31.2 31 - 37 % Final   09/10/2019 10:30 AM 31.5 31 - 37 % Final   08/27/2019 09:53 AM 31.8 31 - 37 % Final     RDW   Date/Time Value Ref Range Status   02/23/2024 10:57 AM 14.6 (H) 11.5 - 14.5 % Final   02/14/2024 07:40 AM 14.7 (H) 11.5 - 14.5 % Final     Platelets   Date/Time Value Ref Range Status   02/23/2024 10:57  150 - 450 x10*3/uL Final   02/14/2024 07:40  150 - 450 x10*3/uL Final   09/24/2019 09:55  150 - 450 K/UL Final   09/10/2019 10:30  150 - 450 K/UL Final   08/27/2019 09:53  150 - 450 K/UL Final     MPV   Date/Time Value Ref Range Status   09/24/2019 09:55 AM 10.2 7.0 - 12.6 CU Final   09/10/2019 10:30 AM 10.6 7.0 - 12.6 CU Final   08/27/2019 09:53 AM 10.4 7.0 - 12.6 CU Final     Neutrophils %   Date/Time Value Ref Range Status   02/23/2024 10:57 AM 79.3 40.0 - 80.0 % Final     Immature Granulocytes %, Automated   Date/Time Value Ref Range Status   02/23/2024 10:57 AM 0.6 0.0 - 0.9 % Final     Comment:     Immature Granulocyte Count (IG) includes promyelocytes, myelocytes and metamyelocytes but does not include bands. Percent differential counts (%) should be interpreted in the context of the absolute cell counts (cells/UL).     Lymphocytes %   Date/Time  Value Ref Range Status   02/23/2024 10:57 AM 12.0 13.0 - 44.0 % Final   09/24/2019 09:55 AM 29.90 20 - 40 % Final   09/10/2019 10:30 AM 29.30 20 - 40 % Final   08/27/2019 09:53 AM 29.90 20 - 40 % Final     Monocytes %   Date/Time Value Ref Range Status   02/23/2024 10:57 AM 6.7 2.0 - 10.0 % Final   09/24/2019 09:55 AM 8.30 (H) 0 - 8 % Final   09/10/2019 10:30 AM 8.70 (H) 0 - 8 % Final   08/27/2019 09:53 AM 9.20 (H) 0 - 8 % Final     Eosinophils %   Date/Time Value Ref Range Status   02/23/2024 10:57 AM 1.3 0.0 - 6.0 % Final     Basophils %   Date/Time Value Ref Range Status   02/23/2024 10:57 AM 0.1 0.0 - 2.0 % Final   09/24/2019 09:55 AM 0.70 0 - 1 % Final   09/10/2019 10:30 AM 0.70 0 - 1 % Final   08/27/2019 09:53 AM 0.60 0 - 1 % Final     Neutrophils Absolute   Date/Time Value Ref Range Status   02/23/2024 10:57 AM 9.48 (H) 1.60 - 5.50 x10*3/uL Final     Comment:     Percent differential counts (%) should be interpreted in the context of the absolute cell counts (cells/uL).   09/24/2019 09:55 AM 5.39 1.8 - 7.7 K/UL Final   09/10/2019 10:30 AM 4.52 1.8 - 7.7 K/UL Final   08/27/2019 09:53 AM 4.59 1.8 - 7.7 K/UL Final     Immature Granulocytes Absolute, Automated   Date/Time Value Ref Range Status   02/23/2024 10:57 AM 0.07 0.00 - 0.50 x10*3/uL Final   09/24/2019 09:55 AM 0.01 0.0 - 0.1 K/UL Final   09/10/2019 10:30 AM 0.02 0.0 - 0.1 K/UL Final   08/27/2019 09:53 AM 0.02 0.0 - 0.1 K/UL Final     Lymphocytes Absolute   Date/Time Value Ref Range Status   02/23/2024 10:57 AM 1.44 0.80 - 3.00 x10*3/uL Final   09/24/2019 09:55 AM 2.70 1.2 - 3.2 K/UL Final   09/10/2019 10:30 AM 2.22 1.2 - 3.2 K/UL Final   08/27/2019 09:53 AM 2.34 1.2 - 3.2 K/UL Final     Monocytes Absolute   Date/Time Value Ref Range Status   02/23/2024 10:57 AM 0.80 0.05 - 0.80 x10*3/uL Final   09/24/2019 09:55 AM 0.75 0 - 0.8 K/UL Final   09/10/2019 10:30 AM 0.66 0 - 0.8 K/UL Final   08/27/2019 09:53 AM 0.72 0 - 0.8 K/UL Final     Eosinophils Absolute    Date/Time Value Ref Range Status   02/23/2024 10:57 AM 0.16 0.00 - 0.40 x10*3/uL Final   09/24/2019 09:55 AM 0.11 0 - 0.45 K/UL Final   09/10/2019 10:30 AM 0.11 0 - 0.45 K/UL Final   08/27/2019 09:53 AM 0.11 0 - 0.45 K/UL Final     Basophils Absolute   Date/Time Value Ref Range Status   02/23/2024 10:57 AM 0.01 0.00 - 0.10 x10*3/uL Final   09/24/2019 09:55 AM 0.06 0.00 - 0.22 K/UL Final   09/10/2019 10:30 AM 0.05 0.00 - 0.22 K/UL Final   08/27/2019 09:53 AM 0.05 0.00 - 0.22 K/UL Final     CT CAP: 1/26/2024: Bowel: No bowel wall thickening or abnormal distention to suggest  bowel obstruction. Mesenteric Lymph Nodes: Enlarged soft tissue masses are noted within the gastrohepatic ligament, the largest measures 4.0 by 4.3 cm. Enlarged periportal soft tissue mass is noted extending into the portacaval space. It measures at least 4.3 by 4.6 by 3.8 cm. Enlarged portacaval soft tissue lesion measures 2.0 by 2.5 cm. Peritoneum: Soft tissue mass is noted centrally within the abdomen within the mesentery measuring approximately 5.3 by 8.2 cm. Vessels: Unremarkable Retroperitoneum: Right periaortic soft tissue measures 2.1 by 1.8 cm. Multiple left periaortic lymph nodes are noted, the largest measuring 2.0 by 1.3 cm. Abdominal Wall: Unremarkable. Bones: No acute bony abnormalities.      IMPRESSION:  Soft tissue mass lesion within the mesentery with multiple retroperitoneal and mesenteric lymph nodes.    Assessment/Plan      Patient seen today for cycle 1 Rituxan CHOP for diffuse large B-cell lymphoma.  Has been started on allopurinol.  When seen today patient is a little anxious but prepared for cycle one of her treatment.  No evidence of infection port site not inflamed.  Cleared for chemotherapy     Diagnoses and all orders for this visit:  Diffuse large B-cell lymphoma, unspecified body region (CMS/HCC)  -     Clinic Appointment Request           Contreras Rowell MD

## 2024-03-07 NOTE — PROGRESS NOTES
Pt is here for her first chemo infusion. Met with pt and her  Toy to introduce self and check-in. Informed pt about SW's role in care team and services offered. Provided pt with information on [facility] and encouraged her and family to participate. Pt says she went to [facility] about 30 years ago when her daughter was going through cancer and had a positive experience with them. Also discussed advanced directives, provided pt with packet on them, and encouraged her to consider completing them. Pt denied any other SW needs at this time. Provided pt with writer's contact information and encouraged them to reach out should any additional questions or concerns arise.   Heidi Marquez, MSW, LSW

## 2024-03-14 ENCOUNTER — APPOINTMENT (OUTPATIENT)
Dept: RADIOLOGY | Facility: HOSPITAL | Age: 74
End: 2024-03-14
Payer: MEDICARE

## 2024-03-14 ENCOUNTER — HOSPITAL ENCOUNTER (EMERGENCY)
Facility: HOSPITAL | Age: 74
Discharge: HOME | End: 2024-03-14
Attending: STUDENT IN AN ORGANIZED HEALTH CARE EDUCATION/TRAINING PROGRAM
Payer: MEDICARE

## 2024-03-14 ENCOUNTER — NURSE TRIAGE (OUTPATIENT)
Dept: HEMATOLOGY/ONCOLOGY | Facility: CLINIC | Age: 74
End: 2024-03-14
Payer: MEDICARE

## 2024-03-14 VITALS
DIASTOLIC BLOOD PRESSURE: 82 MMHG | OXYGEN SATURATION: 96 % | HEIGHT: 64 IN | BODY MASS INDEX: 32.44 KG/M2 | HEART RATE: 76 BPM | WEIGHT: 190 LBS | RESPIRATION RATE: 18 BRPM | TEMPERATURE: 97.7 F | SYSTOLIC BLOOD PRESSURE: 146 MMHG

## 2024-03-14 DIAGNOSIS — D72.819 LEUKOPENIA, UNSPECIFIED TYPE: ICD-10-CM

## 2024-03-14 DIAGNOSIS — R10.9 FLANK PAIN: Primary | ICD-10-CM

## 2024-03-14 LAB
ALBUMIN SERPL-MCNC: 3 G/DL (ref 3.5–5)
ALP BLD-CCNC: 114 U/L (ref 35–125)
ALT SERPL-CCNC: 21 U/L (ref 5–40)
AMORPH CRY #/AREA UR COMP ASSIST: ABNORMAL /HPF
ANION GAP SERPL CALC-SCNC: 8 MMOL/L
APPEARANCE UR: ABNORMAL
AST SERPL-CCNC: 9 U/L (ref 5–40)
BACTERIA #/AREA URNS AUTO: ABNORMAL /HPF
BASOPHILS # BLD MANUAL: 0.01 X10*3/UL (ref 0–0.1)
BASOPHILS NFR BLD MANUAL: 2 %
BILIRUB SERPL-MCNC: 0.8 MG/DL (ref 0.1–1.2)
BILIRUB UR STRIP.AUTO-MCNC: NEGATIVE MG/DL
BUN SERPL-MCNC: 17 MG/DL (ref 8–25)
CALCIUM SERPL-MCNC: 7.9 MG/DL (ref 8.5–10.4)
CHLORIDE SERPL-SCNC: 101 MMOL/L (ref 97–107)
CO2 SERPL-SCNC: 27 MMOL/L (ref 24–31)
COLOR UR: YELLOW
CREAT SERPL-MCNC: 0.7 MG/DL (ref 0.4–1.6)
DACRYOCYTES BLD QL SMEAR: ABNORMAL
EGFRCR SERPLBLD CKD-EPI 2021: 90 ML/MIN/1.73M*2
EOSINOPHIL # BLD MANUAL: 0 X10*3/UL (ref 0–0.4)
EOSINOPHIL NFR BLD MANUAL: 0 %
ERYTHROCYTE [DISTWIDTH] IN BLOOD BY AUTOMATED COUNT: 15.1 % (ref 11.5–14.5)
GLUCOSE SERPL-MCNC: 83 MG/DL (ref 65–99)
GLUCOSE UR STRIP.AUTO-MCNC: NORMAL MG/DL
HCT VFR BLD AUTO: 37.7 % (ref 36–46)
HGB BLD-MCNC: 11.8 G/DL (ref 12–16)
HYALINE CASTS #/AREA URNS AUTO: ABNORMAL /LPF
IMM GRANULOCYTES # BLD AUTO: 0 X10*3/UL (ref 0–0.5)
IMM GRANULOCYTES NFR BLD AUTO: 0 % (ref 0–0.9)
KETONES UR STRIP.AUTO-MCNC: NEGATIVE MG/DL
LEUKOCYTE ESTERASE UR QL STRIP.AUTO: ABNORMAL
LIPASE SERPL-CCNC: 19 U/L (ref 16–63)
LYMPHOCYTES # BLD MANUAL: 0.45 X10*3/UL (ref 0.8–3)
LYMPHOCYTES NFR BLD MANUAL: 90 %
MCH RBC QN AUTO: 26.3 PG (ref 26–34)
MCHC RBC AUTO-ENTMCNC: 31.3 G/DL (ref 32–36)
MCV RBC AUTO: 84 FL (ref 80–100)
MONOCYTES # BLD MANUAL: 0.04 X10*3/UL (ref 0.05–0.8)
MONOCYTES NFR BLD MANUAL: 8 %
MUCOUS THREADS #/AREA URNS AUTO: ABNORMAL /LPF
NEUTS SEG # BLD MANUAL: 0 X10*3/UL (ref 1.6–5)
NEUTS SEG NFR BLD MANUAL: 0 %
NITRITE UR QL STRIP.AUTO: NEGATIVE
NRBC BLD-RTO: 0 /100 WBCS (ref 0–0)
PATH REVIEW-CBC DIFFERENTIAL: NORMAL
PH UR STRIP.AUTO: 7 [PH]
PLATELET # BLD AUTO: 157 X10*3/UL (ref 150–450)
POTASSIUM SERPL-SCNC: 3.2 MMOL/L (ref 3.4–5.1)
PROT SERPL-MCNC: 5.6 G/DL (ref 5.9–7.9)
PROT UR STRIP.AUTO-MCNC: NEGATIVE MG/DL
RBC # BLD AUTO: 4.49 X10*6/UL (ref 4–5.2)
RBC # UR STRIP.AUTO: NEGATIVE /UL
RBC #/AREA URNS AUTO: ABNORMAL /HPF
RBC MORPH BLD: ABNORMAL
SCHISTOCYTES BLD QL SMEAR: ABNORMAL
SODIUM SERPL-SCNC: 136 MMOL/L (ref 133–145)
SP GR UR STRIP.AUTO: 1.01
SQUAMOUS #/AREA URNS AUTO: ABNORMAL /HPF
TOTAL CELLS COUNTED BLD: 50
UROBILINOGEN UR STRIP.AUTO-MCNC: NORMAL MG/DL
WBC # BLD AUTO: 0.5 X10*3/UL (ref 4.4–11.3)
WBC #/AREA URNS AUTO: ABNORMAL /HPF

## 2024-03-14 PROCEDURE — 2550000001 HC RX 255 CONTRASTS: Performed by: STUDENT IN AN ORGANIZED HEALTH CARE EDUCATION/TRAINING PROGRAM

## 2024-03-14 PROCEDURE — 83690 ASSAY OF LIPASE: CPT | Performed by: PHYSICIAN ASSISTANT

## 2024-03-14 PROCEDURE — 85060 BLOOD SMEAR INTERPRETATION: CPT | Performed by: PATHOLOGY

## 2024-03-14 PROCEDURE — 96374 THER/PROPH/DIAG INJ IV PUSH: CPT | Mod: 59

## 2024-03-14 PROCEDURE — 96375 TX/PRO/DX INJ NEW DRUG ADDON: CPT

## 2024-03-14 PROCEDURE — 84075 ASSAY ALKALINE PHOSPHATASE: CPT | Performed by: STUDENT IN AN ORGANIZED HEALTH CARE EDUCATION/TRAINING PROGRAM

## 2024-03-14 PROCEDURE — 81001 URINALYSIS AUTO W/SCOPE: CPT | Performed by: STUDENT IN AN ORGANIZED HEALTH CARE EDUCATION/TRAINING PROGRAM

## 2024-03-14 PROCEDURE — 74177 CT ABD & PELVIS W/CONTRAST: CPT

## 2024-03-14 PROCEDURE — 85027 COMPLETE CBC AUTOMATED: CPT | Performed by: STUDENT IN AN ORGANIZED HEALTH CARE EDUCATION/TRAINING PROGRAM

## 2024-03-14 PROCEDURE — 99284 EMERGENCY DEPT VISIT MOD MDM: CPT | Mod: 25

## 2024-03-14 PROCEDURE — 2500000004 HC RX 250 GENERAL PHARMACY W/ HCPCS (ALT 636 FOR OP/ED): Performed by: PHYSICIAN ASSISTANT

## 2024-03-14 PROCEDURE — 85007 BL SMEAR W/DIFF WBC COUNT: CPT | Performed by: STUDENT IN AN ORGANIZED HEALTH CARE EDUCATION/TRAINING PROGRAM

## 2024-03-14 RX ORDER — NAPROXEN 500 MG/1
500 TABLET ORAL
Qty: 14 TABLET | Refills: 0 | Status: SHIPPED | OUTPATIENT
Start: 2024-03-14 | End: 2024-03-21

## 2024-03-14 RX ORDER — ONDANSETRON HYDROCHLORIDE 2 MG/ML
4 INJECTION, SOLUTION INTRAVENOUS ONCE
Status: COMPLETED | OUTPATIENT
Start: 2024-03-14 | End: 2024-03-14

## 2024-03-14 RX ORDER — KETOROLAC TROMETHAMINE 30 MG/ML
15 INJECTION, SOLUTION INTRAMUSCULAR; INTRAVENOUS ONCE
Status: COMPLETED | OUTPATIENT
Start: 2024-03-14 | End: 2024-03-14

## 2024-03-14 RX ORDER — MORPHINE SULFATE 4 MG/ML
4 INJECTION, SOLUTION INTRAMUSCULAR; INTRAVENOUS ONCE
Status: COMPLETED | OUTPATIENT
Start: 2024-03-14 | End: 2024-03-14

## 2024-03-14 RX ADMIN — SODIUM CHLORIDE 500 ML: 900 INJECTION, SOLUTION INTRAVENOUS at 12:43

## 2024-03-14 RX ADMIN — KETOROLAC TROMETHAMINE 15 MG: 30 INJECTION, SOLUTION INTRAMUSCULAR at 13:20

## 2024-03-14 RX ADMIN — ONDANSETRON 4 MG: 2 INJECTION INTRAMUSCULAR; INTRAVENOUS at 12:42

## 2024-03-14 RX ADMIN — MORPHINE SULFATE 4 MG: 4 INJECTION, SOLUTION INTRAMUSCULAR; INTRAVENOUS at 12:42

## 2024-03-14 RX ADMIN — IOHEXOL 75 ML: 350 INJECTION, SOLUTION INTRAVENOUS at 14:35

## 2024-03-14 ASSESSMENT — PAIN DESCRIPTION - FREQUENCY: FREQUENCY: INTERMITTENT

## 2024-03-14 ASSESSMENT — PAIN SCALES - GENERAL
PAINLEVEL_OUTOF10: 10 - WORST POSSIBLE PAIN
PAINLEVEL_OUTOF10: 5 - MODERATE PAIN
PAINLEVEL_OUTOF10: 3

## 2024-03-14 ASSESSMENT — PAIN DESCRIPTION - PAIN TYPE: TYPE: ACUTE PAIN

## 2024-03-14 ASSESSMENT — PAIN DESCRIPTION - PROGRESSION: CLINICAL_PROGRESSION: NOT CHANGED

## 2024-03-14 ASSESSMENT — PAIN DESCRIPTION - DESCRIPTORS: DESCRIPTORS: STABBING

## 2024-03-14 ASSESSMENT — PAIN DESCRIPTION - ORIENTATION: ORIENTATION: LEFT

## 2024-03-14 ASSESSMENT — PAIN DESCRIPTION - ONSET: ONSET: SUDDEN

## 2024-03-14 ASSESSMENT — PAIN - FUNCTIONAL ASSESSMENT
PAIN_FUNCTIONAL_ASSESSMENT: 0-10
PAIN_FUNCTIONAL_ASSESSMENT: 0-10

## 2024-03-14 ASSESSMENT — PAIN DESCRIPTION - LOCATION: LOCATION: RIB CAGE

## 2024-03-14 NOTE — TELEPHONE ENCOUNTER
Additional Information   Negative: Are you having diarrhea (loose, frequent bowel movements)? If yes, use the diarrhea protocol.     None today so far.   Commented on: How much fluid did you drink in the last 24 hours?     Approximately 18 ounces   Commented on: If feeling dizzy, how often it is happening, how long does it last, is the room spinning?     Unsteady when she walks, occurs when she stands, closes her eyes, so does not know if room spins.   Commented on: When did these problems start?     Has had dizziness for at least a week.   Commented on: What helps these problems?     Nothing   Commented on: Have you called us about this problem before? If yes, when?     no    Protocols used: Dehydration

## 2024-03-14 NOTE — ED PROVIDER NOTES
HPI   Chief Complaint   Patient presents with    Flank Pain     For the past 2 days I have had dysuria and lt flank pain it feels stabbing nauseated with rib pain is stabbing as well        73-year-old female with a past medical history of lymphoma on chemotherapy presenting with right flank pain for last 2 days.  Additionally complains of constipation.  She is well-appearing nontoxic.  She denies chest pain, shortness of breath, numbness weakness.  Denies injury or trauma.  No other complaint.                          Alexandrea Coma Scale Score: 15                     Patient History   Past Medical History:   Diagnosis Date    GERD (gastroesophageal reflux disease)     Gout     Hyperlipidemia     Hypertension     Hypothyroidism     Sleep apnea      Past Surgical History:   Procedure Laterality Date    APPENDECTOMY      CATARACT EXTRACTION Bilateral     COLONOSCOPY       No family history on file.  Social History     Tobacco Use    Smoking status: Former     Packs/day: 1.00     Years: 31.00     Additional pack years: 0.00     Total pack years: 31.00     Types: Cigarettes     Start date:      Quit date:      Years since quittin.2    Smokeless tobacco: Never   Vaping Use    Vaping Use: Never used   Substance Use Topics    Alcohol use: Never    Drug use: Never       Physical Exam   ED Triage Vitals [24 1148]   Temperature Heart Rate Respirations BP   36.4 °C (97.5 °F) 71 (!) 24 150/74      Pulse Ox Temp Source Heart Rate Source Patient Position   97 % Oral Monitor Sitting      BP Location FiO2 (%)     Left arm --       Physical Exam  Vitals and nursing note reviewed.   Constitutional:       Appearance: Normal appearance.   HENT:      Head: Normocephalic.      Mouth/Throat:      Mouth: Mucous membranes are moist.   Cardiovascular:      Rate and Rhythm: Normal rate and regular rhythm.   Pulmonary:      Effort: Pulmonary effort is normal.   Abdominal:      General: Abdomen is flat.      Palpations:  Abdomen is soft.   Musculoskeletal:         General: Normal range of motion.      Cervical back: Normal range of motion.   Skin:     General: Skin is warm.   Neurological:      General: No focal deficit present.      Mental Status: She is alert and oriented to person, place, and time.   Psychiatric:         Mood and Affect: Mood normal.         ED Course & MDM   Diagnoses as of 03/14/24 1548   Flank pain   Leukopenia, unspecified type       Medical Decision Making  I have seen and evaluated this patient.  The attending physician has also seen and evaluated this patient.  Vital signs, laboratory testing and diagnostic images if applicable have been reviewed.  All laboratory and imaging is interpreted by myself unless otherwise stated.  Radiology studies are also formally interpreted by radiologist.    Patient is leukopenic.  There is no bandemia or suggestion of infection.  Her CT scan shows improvement of her malignancy and additionally demonstrates a mesenteric mass on the right side of the abdomen which believe is likely related to the patient's pain.  There is no evidence of urine infection.  There is not significant hematuria.  Patient's pain is controlled.  Released in good condition to follow outpatient with her regular physician.      Labs Reviewed   COMPREHENSIVE METABOLIC PANEL - Abnormal       Result Value    Glucose 83      Sodium 136      Potassium 3.2 (*)     Chloride 101      Bicarbonate 27      Urea Nitrogen 17      Creatinine 0.70      eGFR 90      Calcium 7.9 (*)     Albumin 3.0 (*)     Alkaline Phosphatase 114      Total Protein 5.6 (*)     AST 9      Bilirubin, Total 0.8      ALT 21      Anion Gap 8     CBC WITH AUTO DIFFERENTIAL - Abnormal    WBC 0.5 (*)     nRBC 0.0      RBC 4.49      Hemoglobin 11.8 (*)     Hematocrit 37.7      MCV 84      MCH 26.3      MCHC 31.3 (*)     RDW 15.1 (*)     Platelets 157      Immature Granulocytes %, Automated 0.0      Immature Granulocytes Absolute, Automated  0.00     URINALYSIS WITH REFLEX MICROSCOPIC - Abnormal    Color, Urine Yellow      Appearance, Urine Turbid (*)     Specific Gravity, Urine 1.013      pH, Urine 7.0      Protein, Urine NEGATIVE      Glucose, Urine Normal      Blood, Urine NEGATIVE      Ketones, Urine NEGATIVE      Bilirubin, Urine NEGATIVE      Urobilinogen, Urine Normal      Nitrite, Urine NEGATIVE      Leukocyte Esterase, Urine 25 Richard/µL (*)    MICROSCOPIC ONLY, URINE - Abnormal    WBC, Urine 1-5      RBC, Urine 1-2      Squamous Epithelial Cells, Urine 1-9 (SPARSE)      Bacteria, Urine 1+ (*)     Mucus, Urine FEW      Hyaline Casts, Urine 2+ (*)     Amorphous Crystals, Urine 1+     MANUAL DIFFERENTIAL - Abnormal    Neutrophils %, Manual 0.0      Lymphocytes %, Manual 90.0      Monocytes %, Manual 8.0      Eosinophils %, Manual 0.0      Basophils %, Manual 2.0      Seg Neutrophils Absolute, Manual 0.00 (*)     Lymphocytes Absolute, Manual 0.45 (*)     Monocytes Absolute, Manual 0.04 (*)     Eosinophils Absolute, Manual 0.00      Basophils Absolute, Manual 0.01      Total Cells Counted 50      RBC Morphology See Below      RBC Fragments Few      Teardrop Cells Few     LIPASE - Normal    Lipase 19     PATH REVIEW-CBC DIFFERENTIAL    Pathologist Review-CBC Differential        Value: Patient's history of diffuse large B-cell lymphoma and recent chemotherapy is noted.  Patient's peripheral smear is consistent with CBC findings.     CT abdomen pelvis w IV contrast   Final Result   No acute finding.   Evidence of positive treatment response to chemotherapy with some   reduction in volume of abdominal adenopathy. Mesenteric mass in right   lower abdomen is is of lower density suggesting necrosis.        MACRO:   None        Signed by: Melquiades Bales 3/14/2024 2:59 PM   Dictation workstation:   [id number]        Medications   morphine injection 4 mg (4 mg intravenous Given 3/14/24 1242)   ondansetron (Zofran) injection 4 mg (4 mg intravenous Given 3/14/24  1242)   sodium chloride 0.9 % bolus 500 mL (0 mL intravenous Stopped 3/14/24 1313)   ketorolac (Toradol) injection 15 mg (15 mg intravenous Given 3/14/24 1320)   iohexol (OMNIPaque) 350 mg iodine/mL solution 75 mL (75 mL intravenous Given 3/14/24 1435)     New Prescriptions    NAPROXEN (NAPROSYN) 500 MG TABLET    Take 1 tablet (500 mg) by mouth 2 times a day with meals for 7 days.         Procedure  Procedures     Cj Wolfe PA-C  03/14/24 4315       Cj Wolfe PA-C  03/14/24 3165

## 2024-03-15 ENCOUNTER — TELEPHONE (OUTPATIENT)
Dept: HEMATOLOGY/ONCOLOGY | Facility: CLINIC | Age: 74
End: 2024-03-15
Payer: MEDICARE

## 2024-03-15 DIAGNOSIS — C85.10 B-CELL LYMPHOMA, UNSPECIFIED B-CELL LYMPHOMA TYPE, UNSPECIFIED BODY REGION (MULTI): ICD-10-CM

## 2024-03-15 DIAGNOSIS — C83.30 DIFFUSE LARGE B-CELL LYMPHOMA, UNSPECIFIED BODY REGION (MULTI): ICD-10-CM

## 2024-03-15 NOTE — TELEPHONE ENCOUNTER
Pt called and she states she is feeling a little better.  Discussed neutropenia precautions, Fever 100.4, not to use Tylenol or ibuprofen, chills, etc.  Discussed signs of infection.  Pt verbalizing understanding to all.  Pt did have Neulasta after her treatment. Dr. Rowell aware of all.

## 2024-03-28 ENCOUNTER — PHARMACY VISIT (OUTPATIENT)
Dept: PHARMACY | Facility: CLINIC | Age: 74
End: 2024-03-28

## 2024-03-28 ENCOUNTER — NUTRITION (OUTPATIENT)
Dept: HEMATOLOGY/ONCOLOGY | Facility: CLINIC | Age: 74
End: 2024-03-28

## 2024-03-28 ENCOUNTER — OFFICE VISIT (OUTPATIENT)
Dept: HEMATOLOGY/ONCOLOGY | Facility: CLINIC | Age: 74
End: 2024-03-28
Payer: MEDICARE

## 2024-03-28 ENCOUNTER — INFUSION (OUTPATIENT)
Dept: HEMATOLOGY/ONCOLOGY | Facility: CLINIC | Age: 74
End: 2024-03-28
Payer: MEDICARE

## 2024-03-28 VITALS
HEART RATE: 70 BPM | TEMPERATURE: 97.7 F | DIASTOLIC BLOOD PRESSURE: 70 MMHG | RESPIRATION RATE: 16 BRPM | OXYGEN SATURATION: 95 % | SYSTOLIC BLOOD PRESSURE: 122 MMHG

## 2024-03-28 VITALS
HEART RATE: 65 BPM | SYSTOLIC BLOOD PRESSURE: 118 MMHG | WEIGHT: 184.3 LBS | RESPIRATION RATE: 16 BRPM | OXYGEN SATURATION: 95 % | DIASTOLIC BLOOD PRESSURE: 58 MMHG | BODY MASS INDEX: 31.64 KG/M2 | TEMPERATURE: 97.3 F

## 2024-03-28 VITALS — BODY MASS INDEX: 31.47 KG/M2 | HEIGHT: 64 IN | WEIGHT: 184.3 LBS

## 2024-03-28 DIAGNOSIS — C85.10 B-CELL LYMPHOMA, UNSPECIFIED B-CELL LYMPHOMA TYPE, UNSPECIFIED BODY REGION (MULTI): ICD-10-CM

## 2024-03-28 DIAGNOSIS — K12.30 MUCOSITIS: Primary | ICD-10-CM

## 2024-03-28 DIAGNOSIS — K12.30 MUCOSITIS: ICD-10-CM

## 2024-03-28 DIAGNOSIS — E87.6 HYPOKALEMIA: Primary | ICD-10-CM

## 2024-03-28 DIAGNOSIS — C83.30 DIFFUSE LARGE B-CELL LYMPHOMA, UNSPECIFIED BODY REGION (MULTI): ICD-10-CM

## 2024-03-28 LAB
ALBUMIN SERPL BCP-MCNC: 3.4 G/DL (ref 3.4–5)
ALP SERPL-CCNC: 107 U/L (ref 33–136)
ALT SERPL W P-5'-P-CCNC: 14 U/L (ref 7–45)
ANION GAP SERPL CALC-SCNC: 12 MMOL/L (ref 10–20)
AST SERPL W P-5'-P-CCNC: 11 U/L (ref 9–39)
BASOPHILS # BLD AUTO: 0.06 X10*3/UL (ref 0–0.1)
BASOPHILS NFR BLD AUTO: 0.5 %
BILIRUB SERPL-MCNC: 0.6 MG/DL (ref 0–1.2)
BUN SERPL-MCNC: 11 MG/DL (ref 6–23)
CALCIUM SERPL-MCNC: 9 MG/DL (ref 8.6–10.3)
CHLORIDE SERPL-SCNC: 100 MMOL/L (ref 98–107)
CO2 SERPL-SCNC: 28 MMOL/L (ref 21–32)
CREAT SERPL-MCNC: 0.96 MG/DL (ref 0.5–1.05)
EGFRCR SERPLBLD CKD-EPI 2021: 62 ML/MIN/1.73M*2
EOSINOPHIL # BLD AUTO: 0.08 X10*3/UL (ref 0–0.4)
EOSINOPHIL NFR BLD AUTO: 0.6 %
ERYTHROCYTE [DISTWIDTH] IN BLOOD BY AUTOMATED COUNT: 16.4 % (ref 11.5–14.5)
GLUCOSE SERPL-MCNC: 123 MG/DL (ref 74–99)
HCT VFR BLD AUTO: 35.4 % (ref 36–46)
HGB BLD-MCNC: 11 G/DL (ref 12–16)
IMM GRANULOCYTES # BLD AUTO: 0.07 X10*3/UL (ref 0–0.5)
IMM GRANULOCYTES NFR BLD AUTO: 0.5 % (ref 0–0.9)
LDH SERPL L TO P-CCNC: 148 U/L (ref 84–246)
LYMPHOCYTES # BLD AUTO: 1.05 X10*3/UL (ref 0.8–3)
LYMPHOCYTES NFR BLD AUTO: 8.2 %
MCH RBC QN AUTO: 26.5 PG (ref 26–34)
MCHC RBC AUTO-ENTMCNC: 31.1 G/DL (ref 32–36)
MCV RBC AUTO: 85 FL (ref 80–100)
MONOCYTES # BLD AUTO: 0.95 X10*3/UL (ref 0.05–0.8)
MONOCYTES NFR BLD AUTO: 7.5 %
NEUTROPHILS # BLD AUTO: 10.53 X10*3/UL (ref 1.6–5.5)
NEUTROPHILS NFR BLD AUTO: 82.7 %
PLATELET # BLD AUTO: 286 X10*3/UL (ref 150–450)
POTASSIUM SERPL-SCNC: 3.3 MMOL/L (ref 3.5–5.3)
PROT SERPL-MCNC: 6.2 G/DL (ref 6.4–8.2)
RBC # BLD AUTO: 4.15 X10*6/UL (ref 4–5.2)
SODIUM SERPL-SCNC: 137 MMOL/L (ref 136–145)
URATE SERPL-MCNC: 4.3 MG/DL (ref 2.3–6.7)
WBC # BLD AUTO: 12.7 X10*3/UL (ref 4.4–11.3)

## 2024-03-28 PROCEDURE — 80053 COMPREHEN METABOLIC PANEL: CPT | Performed by: INTERNAL MEDICINE

## 2024-03-28 PROCEDURE — 96417 CHEMO IV INFUS EACH ADDL SEQ: CPT

## 2024-03-28 PROCEDURE — 96413 CHEMO IV INFUSION 1 HR: CPT

## 2024-03-28 PROCEDURE — 3078F DIAST BP <80 MM HG: CPT | Performed by: INTERNAL MEDICINE

## 2024-03-28 PROCEDURE — 96377 APPLICATON ON-BODY INJECTOR: CPT

## 2024-03-28 PROCEDURE — 2500000004 HC RX 250 GENERAL PHARMACY W/ HCPCS (ALT 636 FOR OP/ED): Performed by: INTERNAL MEDICINE

## 2024-03-28 PROCEDURE — 96375 TX/PRO/DX INJ NEW DRUG ADDON: CPT | Mod: INF

## 2024-03-28 PROCEDURE — 96411 CHEMO IV PUSH ADDL DRUG: CPT

## 2024-03-28 PROCEDURE — 96367 TX/PROPH/DG ADDL SEQ IV INF: CPT

## 2024-03-28 PROCEDURE — 3074F SYST BP LT 130 MM HG: CPT | Performed by: INTERNAL MEDICINE

## 2024-03-28 PROCEDURE — 96415 CHEMO IV INFUSION ADDL HR: CPT

## 2024-03-28 PROCEDURE — 1126F AMNT PAIN NOTED NONE PRSNT: CPT | Performed by: INTERNAL MEDICINE

## 2024-03-28 PROCEDURE — 83615 LACTATE (LD) (LDH) ENZYME: CPT | Performed by: INTERNAL MEDICINE

## 2024-03-28 PROCEDURE — 96372 THER/PROPH/DIAG INJ SC/IM: CPT | Performed by: INTERNAL MEDICINE

## 2024-03-28 PROCEDURE — 99215 OFFICE O/P EST HI 40 MIN: CPT | Performed by: INTERNAL MEDICINE

## 2024-03-28 PROCEDURE — 1159F MED LIST DOCD IN RCRD: CPT | Performed by: INTERNAL MEDICINE

## 2024-03-28 PROCEDURE — 2500000001 HC RX 250 WO HCPCS SELF ADMINISTERED DRUGS (ALT 637 FOR MEDICARE OP): Performed by: INTERNAL MEDICINE

## 2024-03-28 PROCEDURE — 84550 ASSAY OF BLOOD/URIC ACID: CPT | Performed by: INTERNAL MEDICINE

## 2024-03-28 PROCEDURE — 85025 COMPLETE CBC W/AUTO DIFF WBC: CPT | Performed by: INTERNAL MEDICINE

## 2024-03-28 PROCEDURE — RXMED WILLOW AMBULATORY MEDICATION CHARGE

## 2024-03-28 RX ORDER — HEPARIN 100 UNIT/ML
500 SYRINGE INTRAVENOUS AS NEEDED
Status: CANCELLED | OUTPATIENT
Start: 2024-03-28

## 2024-03-28 RX ORDER — DIPHENHYDRAMINE HCL 25 MG
50 CAPSULE ORAL ONCE
Status: COMPLETED | OUTPATIENT
Start: 2024-03-28 | End: 2024-03-28

## 2024-03-28 RX ORDER — PROCHLORPERAZINE EDISYLATE 5 MG/ML
10 INJECTION INTRAMUSCULAR; INTRAVENOUS EVERY 6 HOURS PRN
Status: DISCONTINUED | OUTPATIENT
Start: 2024-03-28 | End: 2024-03-28 | Stop reason: HOSPADM

## 2024-03-28 RX ORDER — HEPARIN 100 UNIT/ML
500 SYRINGE INTRAVENOUS AS NEEDED
Status: DISCONTINUED | OUTPATIENT
Start: 2024-03-28 | End: 2024-03-28 | Stop reason: HOSPADM

## 2024-03-28 RX ORDER — ALBUTEROL SULFATE 0.83 MG/ML
3 SOLUTION RESPIRATORY (INHALATION) AS NEEDED
Status: DISCONTINUED | OUTPATIENT
Start: 2024-03-28 | End: 2024-03-28 | Stop reason: HOSPADM

## 2024-03-28 RX ORDER — PROCHLORPERAZINE MALEATE 10 MG
10 TABLET ORAL EVERY 6 HOURS PRN
Status: DISCONTINUED | OUTPATIENT
Start: 2024-03-28 | End: 2024-03-28 | Stop reason: HOSPADM

## 2024-03-28 RX ORDER — POTASSIUM CHLORIDE 600 MG/1
8 TABLET, FILM COATED, EXTENDED RELEASE ORAL DAILY
Qty: 30 TABLET | Refills: 11 | Status: SHIPPED | OUTPATIENT
Start: 2024-03-28 | End: 2025-03-28

## 2024-03-28 RX ORDER — ACETAMINOPHEN 325 MG/1
650 TABLET ORAL ONCE
Status: COMPLETED | OUTPATIENT
Start: 2024-03-28 | End: 2024-03-28

## 2024-03-28 RX ORDER — DIPHENHYDRAMINE HYDROCHLORIDE 50 MG/ML
50 INJECTION INTRAMUSCULAR; INTRAVENOUS AS NEEDED
Status: DISCONTINUED | OUTPATIENT
Start: 2024-03-28 | End: 2024-03-28 | Stop reason: HOSPADM

## 2024-03-28 RX ORDER — EPINEPHRINE 0.3 MG/.3ML
0.3 INJECTION SUBCUTANEOUS EVERY 5 MIN PRN
Status: DISCONTINUED | OUTPATIENT
Start: 2024-03-28 | End: 2024-03-28 | Stop reason: HOSPADM

## 2024-03-28 RX ORDER — FAMOTIDINE 10 MG/ML
20 INJECTION INTRAVENOUS ONCE AS NEEDED
Status: DISCONTINUED | OUTPATIENT
Start: 2024-03-28 | End: 2024-03-28 | Stop reason: HOSPADM

## 2024-03-28 RX ORDER — PALONOSETRON 0.05 MG/ML
0.25 INJECTION, SOLUTION INTRAVENOUS ONCE
Status: COMPLETED | OUTPATIENT
Start: 2024-03-28 | End: 2024-03-28

## 2024-03-28 RX ORDER — DOXORUBICIN HYDROCHLORIDE 2 MG/ML
50 INJECTION, SOLUTION INTRAVENOUS ONCE
Status: COMPLETED | OUTPATIENT
Start: 2024-03-28 | End: 2024-03-28

## 2024-03-28 RX ORDER — HEPARIN SODIUM,PORCINE/PF 10 UNIT/ML
50 SYRINGE (ML) INTRAVENOUS AS NEEDED
Status: CANCELLED | OUTPATIENT
Start: 2024-03-28

## 2024-03-28 RX ADMIN — FOSAPREPITANT DIMEGLUMINE 150 MG: 150 INJECTION, POWDER, LYOPHILIZED, FOR SOLUTION INTRAVENOUS at 10:05

## 2024-03-28 RX ADMIN — SODIUM CHLORIDE 753.75 MG: 9 INJECTION, SOLUTION INTRAVENOUS at 12:34

## 2024-03-28 RX ADMIN — PEGFILGRASTIM 6 MG: KIT SUBCUTANEOUS at 15:42

## 2024-03-28 RX ADMIN — DIPHENHYDRAMINE HYDROCHLORIDE 50 MG: 25 CAPSULE ORAL at 11:27

## 2024-03-28 RX ADMIN — ACETAMINOPHEN 650 MG: 325 TABLET ORAL at 11:27

## 2024-03-28 RX ADMIN — HEPARIN 500 UNITS: 100 SYRINGE at 15:50

## 2024-03-28 RX ADMIN — VINCRISTINE SULFATE 2 MG: 1 INJECTION, SOLUTION INTRAVENOUS at 11:24

## 2024-03-28 RX ADMIN — DOXORUBICIN HYDROCHLORIDE 100 MG: 2 INJECTION, SOLUTION INTRAVENOUS at 10:52

## 2024-03-28 RX ADMIN — PALONOSETRON 250 MCG: 0.05 INJECTION, SOLUTION INTRAVENOUS at 09:54

## 2024-03-28 RX ADMIN — CYCLOPHOSPHAMIDE 1500 MG: 1 INJECTION, POWDER, FOR SOLUTION INTRAVENOUS; ORAL at 11:47

## 2024-03-28 ASSESSMENT — ENCOUNTER SYMPTOMS
RESPIRATORY NEGATIVE: 1
EYES NEGATIVE: 1
CONSTITUTIONAL NEGATIVE: 1

## 2024-03-28 ASSESSMENT — PAIN SCALES - GENERAL: PAINLEVEL: 0-NO PAIN

## 2024-03-28 NOTE — PROGRESS NOTES
"NUTRITION Follow-up NOTE    Nutrition Assessment     Reason for Visit:  Kellie Rae is a 73 y.o. female who presents for diffuse large B cell lymphoma.   Hx: HTN, HLD  Current tx: [facility]  Started: 3/7/24    Nutrition consult for weight loss.  Visited with pt today during treatment for follow-up     Lab Results   Component Value Date/Time    GLUCOSE 123 (H) 03/28/2024 0905     03/28/2024 0905    K 3.3 (L) 03/28/2024 0905     03/28/2024 0905    CO2 28 03/28/2024 0905    ANIONGAP 12 03/28/2024 0905    BUN 11 03/28/2024 0905    CREATININE 0.96 03/28/2024 0905    EGFR 62 03/28/2024 0905    CALCIUM 9.0 03/28/2024 0905    ALBUMIN 3.4 03/28/2024 0905    ALKPHOS 107 03/28/2024 0905    PROT 6.2 (L) 03/28/2024 0905    AST 11 03/28/2024 0905    BILITOT 0.6 03/28/2024 0905    ALT 14 03/28/2024 0905     No results found for: \"VITD25\"    Anthropometrics:  Anthropometrics  Height: 162.6 cm (5' 4.02\")  Weight: 83.6 kg (184 lb 4.9 oz)  BMI (Calculated): 31.62  IBW/kg (Dietitian Calculated): 54.5 kg  Adjusted Body Weight (kg): 63 kg  Weight Change  Weight History / % Weight Change: 4.4% loss in 2 weeks; 6.2% loss in 1 month  Significant Weight Loss: Yes  Interpretation of Weight Loss: >5% in 1 month        Wt Readings from Last 10 Encounters:   03/28/24 83.6 kg (184 lb 4.9 oz)   03/28/24 83.6 kg (184 lb 4.9 oz)   03/14/24 86.2 kg (190 lb)   03/07/24 87.5 kg (192 lb 12.7 oz)   03/07/24 87.5 kg (192 lb 12.7 oz)   03/05/24 86.2 kg (190 lb)   02/23/24 89.1 kg (196 lb 6.9 oz)   02/12/24 90.4 kg (199 lb 4.7 oz)   02/02/24 91.7 kg (202 lb 0.8 oz)   02/08/22 102 kg (225 lb)        Food And Nutrient Intake:  Food and Nutrient History  Food and Nutrient History: Stomach cramping every night for 2; sleeping during day since not at night. Not liking soda- likes cereal (raisin bran crunch); foods are either too sweet or too salty  Energy Intake: Fair 50-75 %, Poor < 50 %  Fluid Intake: gatorade- orange about 4-12oz) and water " "(2-3, 24oz bottles)  Food Intolerance: Lactose intolerant- some cheese, lactaid milk/ ice cream, no yogurt; chocolate causes severe pain  GI Symptoms: nausea, constipation (antinausea medication)  GI Symptoms greater than 2 weeks: intermittent  Oral Problems: mucositis, dysgeusia, xerostomia, thrush     Food Intake  Amount of Food: Eating when hungry; still not able to finish plate - about 1 meal a day; tried ONS but didn't like. 1-2 slices of raisin toast in the morning;  Meal 1: B = Fairlife milk 2% with cereal  Meal 3: D = italian wedding soup homemade- about 1/2 bowl  Snacks: Pudding snack pack                                                        Nutrition Focused Physical Exam Findings:  Completed 3/7/24                        Energy Needs  Calculated Energy Needs Using Equations  Height: 162.6 cm (5' 4.02\")  Weight Used for Equation Calculations: 63 kg (138 lb 14.2 oz) (adjusted weight)  [place]- [place] Equation (Overweight or Obese Patients): 1110  Estimated Energy Needs  Total Energy Estimated Needs (kCal): 1890 kCal  Total Estimated Energy Need per Day (kCal/kg): 30 kCal/kg  Estimated Fluid Needs  Total Fluid Estimated Needs (mL): 1890 mL  Total Fluid Estimated Needs (mL/kg): 30 mL/kg  Method for Estimating Needs: additional needs for diarrhea/vomiting  Estimated Protein Needs  Total Protein Estimated Needs (g): 75.6 g  Total Protein Estimated Needs (g/kg): 1.2 g/kg  Method for Estimating Needs: up to 88g/day        Nutrition Diagnosis   Malnutrition Diagnosis  Patient has Malnutrition Diagnosis: Yes  Diagnosis Status: New  Malnutrition Diagnosis: Severe malnutrition related to chronic disease or condition  As Evidenced by: 6.2% weight loss in 1 month, oral intake < 50-75% of needs for > 1 week, pt reporting nutrition impact symptoms of mucositis, dysgeusia, xerostomia, constipation, nausea, anorexia, while undergoing treatment for DLBCL.    Nutrition Diagnosis  Patient has Nutrition Diagnosis: " Yes  Diagnosis Status (1): Ongoing  Nutrition Diagnosis 1: Predicted inadequate energy intake  Related to (1): potential for nutrition impact symptoms with treatment  As Evidenced by (1): pt undergoing chemotherapy for DLBCL.    Nutrition Interventions/Recommendations   Nutrition Prescription  Individualized Nutrition Prescription Provided for : Nutrition during cancer treatment, management of nutrition impact symptoms.    Food and Nutrition Delivery  Food and Nutrition Delivery  Meals & Snacks: Energy-modified diet, Protein-modified diet, Fluid-modified diet  Goals: Reviewed need for adequate calories/ protein and ways to manage. Recommend at least 3 meals a day; pt does not want to have to drink ONS so discussed snacks instead. Continue with compliance of antinausea medication and other medications started on for oral care.  Medical Food Supplement: Commercial beverage  Goals: Likes fairlife milk- does not like ONS  Feeding Assistance: Mouth care  Goals: continue use of sodium bicarb rinse, start magic mouth wash and nystatin for thrush    Nutrition Education  Nutrition Education  Nutrition Education Content: Content related nutrition education  Goals: High calorie high protein diet    Coordination of Care  Coordination of Nutrition Care by a Nutrition Professional  Collaboration and referral of nutrition care: Collaboration by nutrition professional with other providers  Goals: Pt to start on medications to help with thrush and mgmt of mucositis. Discussed with INF RN and Dr. Rowell    Patient Instructions   Encouraged at least 3 meals/ snacks daily; discussed ways to add calories and protein and adding higher calorie foods since portions are smaller. Discussed mouth care given possible thrush and mucositis following previous infusion.     Nutrition Monitoring and Evaluation   Food/Nutrient Related History Monitoring  Monitoring and Evaluation Plan: Energy intake, Fluid intake, Meal/snack pattern, Protein intake,  Amount of food  Energy Intake: Estimated energy intake  Criteria: Meet at least 75% of caloric needs  Fluid Intake: Estimated fluid intake  Criteria: Maintain hydration; 63+oz daily  Amount of Food: Medical food intake  Criteria: lower lactose options as able  Meal/Snack Pattern: Estimated meal and snack pattern  Criteria: 4-6 meals/snacks per day at least 50% of the time  Estimated protein intake: Estimated protein intake  Criteria: Meet at least 75% of protein needs orally  Body Composition/Growth/Weight History  Monitoring and Evaluation Plan: Weight  Weight: Measured weight  Criteria: Maintain weight          Time Spent  Prep time on day of patient encounter: 5 minutes  Time spent directly with patient, family or caregiver: 20 minutes  Additional Time Spent on Patient Care Activities: 0 minutes  Documentation Time: 15 minutes  Other Time Spent: 0 minutes  Total: 40 minutes

## 2024-03-28 NOTE — PATIENT INSTRUCTIONS
Your neulasta onpro was placed at 3:45PM Medication delivery will start at 6:45PM it will take 45 minutes to complete, during this time the green light flash. The delivery should be completed by 7:45PM Once dose delivery is complete, you will hear one long beep and the light will turn solid green. To confirm dose delivery, please check the black line on the injector to make sure it is on empty. To remove, gently peel the tape off and dispose off the injector.   Check the status light occasionally to make sure it flashes green, if at any time you notice that the light is red, please call your healthcare provider at [phone].  One of the most common S/E of neulasta is bone pain, to help minimize discomfort, suggestion made to take over the counter Claritin once a day.     Medications ordered today:     Magic Mouthwash swish and swallow 15ML 4 times a day for 15 days     Potassium Chloride 8 mEq once per day

## 2024-03-28 NOTE — PATIENT INSTRUCTIONS
Encouraged at least 3 meals/ snacks daily; discussed ways to add calories and protein and adding higher calorie foods since portions are smaller. Discussed mouth care given possible thrush and mucositis following previous infusion.

## 2024-03-28 NOTE — PROGRESS NOTES
Patient ID: Kellie Rae is a 73 y.o. female.  Referring Physician: Contreras Rowell MD  [id number] [place] [address]  Primary Care Provider: Douglas Yañez MD  Visit Type:  Follow Up     Subjective    HPI  Referred by primary care doctor because of the results of CT chest abdomen and pelvis showing a soft tissue mass lesion within the mesentery with multiple retroperitoneal and mesenteric lymph and adenopathy.  Radiographically the findings could represent lymphoma.  I was able to elicit from had that his symptoms began about 5 years ago with pain in the abdomen and the back she subsequently was diagnosed with gout and it is not clear the etiology of the of the gout currently on allopurinol and colchicine.  She is also on nonsteroidals anti-inflammatory agents as well.  She was referred to medical oncology because a CT scan of the abdomen showed intra-abdominal lesions reminiscent of lymphadenopathy/Lymphoma.  Biopsy has been ordered to confirm the diagnosis.    FINAL DIAGNOSIS      A. SOFT TISSUE MASS, MESENTERY, BIOPSY:      DIFFUSE LARGE B CELL LYMPHOMA, FINAL CLASSIFICATION PENDING GENETIC STUDIES, SEE NOTE.     SUBTYPE BY PALMA CRITERIA:  NON GERMINAL CENTER CELL TYPE     MYC/BCL-2 EXPRESSION: NOT A DOUBLE EXPRESSOR (MYC-,BCL-2+)     Review of Systems   Constitutional: Negative.    HENT:  Negative.     Eyes: Negative.    Respiratory: Negative.        PERITONEUM/RETROPERITONEUM/LYMPH NODES: CT 3/14/2024  No ascites or free air, no fluid collection.  Celiac axis adenopathy with largest node 3.2 cm, decreased from 4.5 cm on prior. Periportal adenopathy with largest alis mass 3.1 cm, previously 3.8 cm. 6.8 cm mass in right lower mesentery, previously 8.2 cm. The alis masses  also appear to have more fluid density centrally. Several mildly enlarged periaortic lymph nodes, difficult to measure, appear to be decreasing in volume compared to prior.      BONES AND ABDOMINAL WALL:  No  suspicious osseous lesions are identified.  The abdominal wall soft tissues appear normal.      IMPRESSION:  No acute finding. Evidence of positive treatment response to chemotherapy with some  reduction in volume of abdominal adenopathy. Mesenteric mass in right lower abdomen is is of lower density suggesting necrosis.      Signed by: Melquiades Bales 3/14/2024  Objective   BSA: 1.94 meters squared  /58 (BP Location: Left arm)   Pulse 65   Temp 36.3 °C (97.3 °F)   Resp 16   Wt 83.6 kg (184 lb 4.9 oz)   SpO2 95%   BMI 31.64 kg/m²      has a past medical history of GERD (gastroesophageal reflux disease), Gout, Hyperlipidemia, Hypertension, Hypothyroidism, and Sleep apnea.   has a past surgical history that includes Appendectomy; Colonoscopy; and Cataract extraction (Bilateral).  No family history on file.  Oncology History   Lymphoma (CMS/HCC)   2/23/2024 Initial Diagnosis    Lymphoma (CMS/HCC)     3/7/2024 -  Chemotherapy    R-CHOP (Cyclophosphamide / DOXOrubicin / VinCRIStine / PredniSONE) + RiTUXimab, 21 Day Cycles         Kellie Rae  reports that she quit smoking about 25 years ago. Her smoking use included cigarettes. She started smoking about 56 years ago. She has a 31.00 pack-year smoking history. She has never used smokeless tobacco.  She  reports no history of alcohol use.  She  reports no history of drug use.    Physical Exam  Constitutional:       Appearance: Normal appearance.   HENT:      Head: Normocephalic and atraumatic.   Eyes:      Extraocular Movements: Extraocular movements intact.      Pupils: Pupils are equal, round, and reactive to light.   Neurological:      Mental Status: She is alert.         WBC   Date/Time Value Ref Range Status   03/28/2024 09:05 AM 12.7 (H) 4.4 - 11.3 x10*3/uL Final   03/14/2024 12:16 PM 0.5 (LL) 4.4 - 11.3 x10*3/uL Final   03/07/2024 08:57 AM 11.6 (H) 4.4 - 11.3 x10*3/uL Final     nRBC   Date Value Ref Range Status   03/14/2024 0.0 0.0 - 0.0 /100 WBCs  Final   02/14/2024 0.0 0.0 - 0.0 /100 WBCs Final   09/24/2019 0 0 /100 WBC Final   09/10/2019 0 0 /100 WBC Final   08/27/2019 0 0 /100 WBC Final     RBC   Date Value Ref Range Status   03/28/2024 4.15 4.00 - 5.20 x10*6/uL Final   03/14/2024 4.49 4.00 - 5.20 x10*6/uL Final   03/07/2024 4.34 4.00 - 5.20 x10*6/uL Final     Hemoglobin   Date Value Ref Range Status   03/28/2024 11.0 (L) 12.0 - 16.0 g/dL Final   03/14/2024 11.8 (L) 12.0 - 16.0 g/dL Final   03/07/2024 11.7 (L) 12.0 - 16.0 g/dL Final     Hematocrit   Date Value Ref Range Status   03/28/2024 35.4 (L) 36.0 - 46.0 % Final   03/14/2024 37.7 36.0 - 46.0 % Final   03/07/2024 37.5 36.0 - 46.0 % Final     MCV   Date/Time Value Ref Range Status   03/28/2024 09:05 AM 85 80 - 100 fL Final   03/14/2024 12:16 PM 84 80 - 100 fL Final   03/07/2024 08:57 AM 86 80 - 100 fL Final     MCH   Date/Time Value Ref Range Status   03/28/2024 09:05 AM 26.5 26.0 - 34.0 pg Final   03/14/2024 12:16 PM 26.3 26.0 - 34.0 pg Final   03/07/2024 08:57 AM 27.0 26.0 - 34.0 pg Final     MCHC   Date/Time Value Ref Range Status   03/28/2024 09:05 AM 31.1 (L) 32.0 - 36.0 g/dL Final   03/14/2024 12:16 PM 31.3 (L) 32.0 - 36.0 g/dL Final   03/07/2024 08:57 AM 31.2 (L) 32.0 - 36.0 g/dL Final     RDW   Date/Time Value Ref Range Status   03/28/2024 09:05 AM 16.4 (H) 11.5 - 14.5 % Final   03/14/2024 12:16 PM 15.1 (H) 11.5 - 14.5 % Final   03/07/2024 08:57 AM 14.9 (H) 11.5 - 14.5 % Final     Platelets   Date/Time Value Ref Range Status   03/28/2024 09:05  150 - 450 x10*3/uL Final     Comment:     Pt Hx   03/14/2024 12:16  150 - 450 x10*3/uL Final     Comment:     Platelet count verified by smear review.   03/07/2024 08:57  150 - 450 x10*3/uL Final     MPV   Date/Time Value Ref Range Status   09/24/2019 09:55 AM 10.2 7.0 - 12.6 CU Final   09/10/2019 10:30 AM 10.6 7.0 - 12.6 CU Final   08/27/2019 09:53 AM 10.4 7.0 - 12.6 CU Final     Neutrophils %   Date/Time Value Ref Range Status    03/28/2024 09:05 AM 82.7 40.0 - 80.0 % Final   03/07/2024 08:57 AM 89.3 40.0 - 80.0 % Final   02/23/2024 10:57 AM 79.3 40.0 - 80.0 % Final     Immature Granulocytes %, Automated   Date/Time Value Ref Range Status   03/28/2024 09:05 AM 0.5 0.0 - 0.9 % Final     Comment:     Immature Granulocyte Count (IG) includes promyelocytes, myelocytes and metamyelocytes but does not include bands. Percent differential counts (%) should be interpreted in the context of the absolute cell counts (cells/UL).   03/14/2024 12:16 PM 0.0 0.0 - 0.9 % Final     Comment:     Immature Granulocyte Count (IG) includes promyelocytes, myelocytes and metamyelocytes but does not include bands. Percent differential counts (%) should be interpreted in the context of the absolute cell counts (cells/UL).   03/07/2024 08:57 AM 0.3 0.0 - 0.9 % Final     Comment:     Immature Granulocyte Count (IG) includes promyelocytes, myelocytes and metamyelocytes but does not include bands. Percent differential counts (%) should be interpreted in the context of the absolute cell counts (cells/UL).     Lymphocytes %, Manual   Date/Time Value Ref Range Status   03/14/2024 12:16 PM 90.0 13.0 - 44.0 % Final     Lymphocytes %   Date/Time Value Ref Range Status   03/28/2024 09:05 AM 8.2 13.0 - 44.0 % Final   03/07/2024 08:57 AM 7.4 13.0 - 44.0 % Final   02/23/2024 10:57 AM 12.0 13.0 - 44.0 % Final     Monocytes %, Manual   Date/Time Value Ref Range Status   03/14/2024 12:16 PM 8.0 2.0 - 10.0 % Final     Monocytes %   Date/Time Value Ref Range Status   03/28/2024 09:05 AM 7.5 2.0 - 10.0 % Final   03/07/2024 08:57 AM 2.5 2.0 - 10.0 % Final   02/23/2024 10:57 AM 6.7 2.0 - 10.0 % Final     Eosinophils %, Manual   Date/Time Value Ref Range Status   03/14/2024 12:16 PM 0.0 0.0 - 6.0 % Final     Eosinophils %   Date/Time Value Ref Range Status   03/28/2024 09:05 AM 0.6 0.0 - 6.0 % Final   03/07/2024 08:57 AM 0.4 0.0 - 6.0 % Final   02/23/2024 10:57 AM 1.3 0.0 - 6.0 % Final      Basophils %, Manual   Date/Time Value Ref Range Status   03/14/2024 12:16 PM 2.0 0.0 - 2.0 % Final     Basophils %   Date/Time Value Ref Range Status   03/28/2024 09:05 AM 0.5 0.0 - 2.0 % Final   03/07/2024 08:57 AM 0.1 0.0 - 2.0 % Final   02/23/2024 10:57 AM 0.1 0.0 - 2.0 % Final     Neutrophils Absolute   Date/Time Value Ref Range Status   03/28/2024 09:05 AM 10.53 (H) 1.60 - 5.50 x10*3/uL Final     Comment:     Percent differential counts (%) should be interpreted in the context of the absolute cell counts (cells/uL).   03/07/2024 08:57 AM 10.35 (H) 1.60 - 5.50 x10*3/uL Final     Comment:     Percent differential counts (%) should be interpreted in the context of the absolute cell counts (cells/uL).   02/23/2024 10:57 AM 9.48 (H) 1.60 - 5.50 x10*3/uL Final     Comment:     Percent differential counts (%) should be interpreted in the context of the absolute cell counts (cells/uL).     Immature Granulocytes Absolute, Automated   Date/Time Value Ref Range Status   03/28/2024 09:05 AM 0.07 0.00 - 0.50 x10*3/uL Final   03/14/2024 12:16 PM 0.00 0.00 - 0.50 x10*3/uL Final   03/07/2024 08:57 AM 0.03 0.00 - 0.50 x10*3/uL Final     Lymphocytes Absolute   Date/Time Value Ref Range Status   03/28/2024 09:05 AM 1.05 0.80 - 3.00 x10*3/uL Final   03/07/2024 08:57 AM 0.86 0.80 - 3.00 x10*3/uL Final   02/23/2024 10:57 AM 1.44 0.80 - 3.00 x10*3/uL Final     Monocytes Absolute   Date/Time Value Ref Range Status   03/28/2024 09:05 AM 0.95 (H) 0.05 - 0.80 x10*3/uL Final   03/07/2024 08:57 AM 0.29 0.05 - 0.80 x10*3/uL Final   02/23/2024 10:57 AM 0.80 0.05 - 0.80 x10*3/uL Final     Eosinophils Absolute   Date/Time Value Ref Range Status   03/28/2024 09:05 AM 0.08 0.00 - 0.40 x10*3/uL Final   03/07/2024 08:57 AM 0.05 0.00 - 0.40 x10*3/uL Final   02/23/2024 10:57 AM 0.16 0.00 - 0.40 x10*3/uL Final     Eosinophils Absolute, Manual   Date/Time Value Ref Range Status   03/14/2024 12:16 PM 0.00 0.00 - 0.40 x10*3/uL Final     Basophils  Absolute   Date/Time Value Ref Range Status   03/28/2024 09:05 AM 0.06 0.00 - 0.10 x10*3/uL Final   03/07/2024 08:57 AM 0.01 0.00 - 0.10 x10*3/uL Final   02/23/2024 10:57 AM 0.01 0.00 - 0.10 x10*3/uL Final     Basophils Absolute, Manual   Date/Time Value Ref Range Status   03/14/2024 12:16 PM 0.01 0.00 - 0.10 x10*3/uL Final       Assessment/Plan    Patient seen today for cycle 2 Rituxan CHOP for diffuse large B-cell lymphoma.  Has been started on allopurinol.  Has Mucositis : BMX prescribed. No evidence of infection port site not inflamed.  Cleared for chemotherapy    C2 CHOP rituxan: Cleared for chemotherapy today: RTC 6 weeks.     Diagnoses and all orders for this visit:  Diffuse large B-cell lymphoma, unspecified body region (CMS/HCC)  -     Clinic Appointment Request           Contreras Rowell MD

## 2024-03-28 NOTE — PATIENT INSTRUCTIONS
Today you visited with your Oncologist.  Your recent labs and test results were discussed and questions were answered.  Your current treatment regimen was discussed and the plan going forward was also discussed. Dr. Rowell would like to see you again in 3 weeks with your next treatment. We also called in some mouthwash to your pharmacy. Please call our office should your mouthsores/thrush get worse or is not getting any better.  Scheduling orders were placed to reflect this conversation.  Please don't hesitate to contact our office should you have any further questions or concerns, [phone]. Thank you.

## 2024-03-28 NOTE — PROGRESS NOTES
Pt came in today, umm labs, K+: 3.3, pt had diarrhea this AM, thrush noted as well on back of tongue, MD aware, MD stated okay to take imodium, n.o. potassium 8 meq po every day, magic mouth wash 4 times a day for 15 days, typed out instructions for pt on discharge paperwork, came up with a plan for her antinausea medications at home, denies fevers, cough, SOB, no rashes, reinforced neutropenic education with teach back.  Pts magic mouth wash sent to [facility] outpatient pharmacy. Pt received rituximab today with increased rates without complications, she was a little dizzy from the benadryl. Monitored very closely with vitals every 15 minutes, VSS, discharged home.

## 2024-04-01 NOTE — ADDENDUM NOTE
Encounter addended by: Kat Nguyen RN on: 4/1/2024 4:37 PM   Actions taken: Charge Capture section accepted

## 2024-04-18 ENCOUNTER — OFFICE VISIT (OUTPATIENT)
Dept: HEMATOLOGY/ONCOLOGY | Facility: CLINIC | Age: 74
End: 2024-04-18
Payer: MEDICARE

## 2024-04-18 ENCOUNTER — INFUSION (OUTPATIENT)
Dept: HEMATOLOGY/ONCOLOGY | Facility: CLINIC | Age: 74
End: 2024-04-18
Payer: MEDICARE

## 2024-04-18 ENCOUNTER — NUTRITION (OUTPATIENT)
Dept: HEMATOLOGY/ONCOLOGY | Facility: CLINIC | Age: 74
End: 2024-04-18

## 2024-04-18 VITALS
TEMPERATURE: 98.4 F | OXYGEN SATURATION: 96 % | RESPIRATION RATE: 18 BRPM | BODY MASS INDEX: 31.66 KG/M2 | WEIGHT: 184.53 LBS | HEART RATE: 75 BPM | DIASTOLIC BLOOD PRESSURE: 71 MMHG | SYSTOLIC BLOOD PRESSURE: 150 MMHG

## 2024-04-18 VITALS — BODY MASS INDEX: 31.5 KG/M2 | WEIGHT: 184.53 LBS | HEIGHT: 64 IN

## 2024-04-18 DIAGNOSIS — C83.30 DIFFUSE LARGE B-CELL LYMPHOMA, UNSPECIFIED BODY REGION (MULTI): ICD-10-CM

## 2024-04-18 DIAGNOSIS — C78.5 SECONDARY MALIGNANT NEOPLASM OF LARGE INTESTINE AND RECTUM (MULTI): ICD-10-CM

## 2024-04-18 DIAGNOSIS — C85.10 B-CELL LYMPHOMA, UNSPECIFIED B-CELL LYMPHOMA TYPE, UNSPECIFIED BODY REGION (MULTI): ICD-10-CM

## 2024-04-18 LAB
ALBUMIN SERPL BCP-MCNC: 3.4 G/DL (ref 3.4–5)
ALP SERPL-CCNC: 101 U/L (ref 33–136)
ALT SERPL W P-5'-P-CCNC: 11 U/L (ref 7–45)
ANION GAP SERPL CALC-SCNC: 10 MMOL/L (ref 10–20)
AST SERPL W P-5'-P-CCNC: 10 U/L (ref 9–39)
BASOPHILS # BLD AUTO: 0.03 X10*3/UL (ref 0–0.1)
BASOPHILS NFR BLD AUTO: 0.2 %
BILIRUB SERPL-MCNC: 0.6 MG/DL (ref 0–1.2)
BUN SERPL-MCNC: 11 MG/DL (ref 6–23)
CALCIUM SERPL-MCNC: 8.4 MG/DL (ref 8.6–10.3)
CHLORIDE SERPL-SCNC: 100 MMOL/L (ref 98–107)
CO2 SERPL-SCNC: 32 MMOL/L (ref 21–32)
CREAT SERPL-MCNC: 0.73 MG/DL (ref 0.5–1.05)
EGFRCR SERPLBLD CKD-EPI 2021: 85 ML/MIN/1.73M*2
EOSINOPHIL # BLD AUTO: 0.04 X10*3/UL (ref 0–0.4)
EOSINOPHIL NFR BLD AUTO: 0.3 %
ERYTHROCYTE [DISTWIDTH] IN BLOOD BY AUTOMATED COUNT: 18.9 % (ref 11.5–14.5)
GLUCOSE SERPL-MCNC: 97 MG/DL (ref 74–99)
HCT VFR BLD AUTO: 32.4 % (ref 36–46)
HGB BLD-MCNC: 10 G/DL (ref 12–16)
IMM GRANULOCYTES # BLD AUTO: 0.06 X10*3/UL (ref 0–0.5)
IMM GRANULOCYTES NFR BLD AUTO: 0.5 % (ref 0–0.9)
LDH SERPL L TO P-CCNC: 161 U/L (ref 84–246)
LYMPHOCYTES # BLD AUTO: 1.17 X10*3/UL (ref 0.8–3)
LYMPHOCYTES NFR BLD AUTO: 9.5 %
MCH RBC QN AUTO: 27.3 PG (ref 26–34)
MCHC RBC AUTO-ENTMCNC: 30.9 G/DL (ref 32–36)
MCV RBC AUTO: 89 FL (ref 80–100)
MONOCYTES # BLD AUTO: 0.82 X10*3/UL (ref 0.05–0.8)
MONOCYTES NFR BLD AUTO: 6.7 %
NEUTROPHILS # BLD AUTO: 10.14 X10*3/UL (ref 1.6–5.5)
NEUTROPHILS NFR BLD AUTO: 82.8 %
PLATELET # BLD AUTO: 224 X10*3/UL (ref 150–450)
POTASSIUM SERPL-SCNC: 3.3 MMOL/L (ref 3.5–5.3)
PROT SERPL-MCNC: 5.6 G/DL (ref 6.4–8.2)
RBC # BLD AUTO: 3.66 X10*6/UL (ref 4–5.2)
SODIUM SERPL-SCNC: 139 MMOL/L (ref 136–145)
WBC # BLD AUTO: 12.3 X10*3/UL (ref 4.4–11.3)

## 2024-04-18 PROCEDURE — 1126F AMNT PAIN NOTED NONE PRSNT: CPT | Performed by: INTERNAL MEDICINE

## 2024-04-18 PROCEDURE — 96413 CHEMO IV INFUSION 1 HR: CPT

## 2024-04-18 PROCEDURE — 2500000001 HC RX 250 WO HCPCS SELF ADMINISTERED DRUGS (ALT 637 FOR MEDICARE OP): Performed by: INTERNAL MEDICINE

## 2024-04-18 PROCEDURE — 96375 TX/PRO/DX INJ NEW DRUG ADDON: CPT | Mod: INF

## 2024-04-18 PROCEDURE — 83615 LACTATE (LD) (LDH) ENZYME: CPT | Performed by: INTERNAL MEDICINE

## 2024-04-18 PROCEDURE — 3078F DIAST BP <80 MM HG: CPT | Performed by: INTERNAL MEDICINE

## 2024-04-18 PROCEDURE — 2500000004 HC RX 250 GENERAL PHARMACY W/ HCPCS (ALT 636 FOR OP/ED): Performed by: INTERNAL MEDICINE

## 2024-04-18 PROCEDURE — 96411 CHEMO IV PUSH ADDL DRUG: CPT

## 2024-04-18 PROCEDURE — 99214 OFFICE O/P EST MOD 30 MIN: CPT | Mod: 25 | Performed by: INTERNAL MEDICINE

## 2024-04-18 PROCEDURE — 96417 CHEMO IV INFUS EACH ADDL SEQ: CPT

## 2024-04-18 PROCEDURE — 3077F SYST BP >= 140 MM HG: CPT | Performed by: INTERNAL MEDICINE

## 2024-04-18 PROCEDURE — 96415 CHEMO IV INFUSION ADDL HR: CPT

## 2024-04-18 PROCEDURE — 2500000004 HC RX 250 GENERAL PHARMACY W/ HCPCS (ALT 636 FOR OP/ED): Mod: JZ,JG | Performed by: INTERNAL MEDICINE

## 2024-04-18 PROCEDURE — 80053 COMPREHEN METABOLIC PANEL: CPT | Performed by: INTERNAL MEDICINE

## 2024-04-18 PROCEDURE — 96367 TX/PROPH/DG ADDL SEQ IV INF: CPT

## 2024-04-18 PROCEDURE — 85025 COMPLETE CBC W/AUTO DIFF WBC: CPT | Performed by: INTERNAL MEDICINE

## 2024-04-18 PROCEDURE — 82378 CARCINOEMBRYONIC ANTIGEN: CPT | Mod: GEALAB | Performed by: INTERNAL MEDICINE

## 2024-04-18 PROCEDURE — 99214 OFFICE O/P EST MOD 30 MIN: CPT | Performed by: INTERNAL MEDICINE

## 2024-04-18 PROCEDURE — 1159F MED LIST DOCD IN RCRD: CPT | Performed by: INTERNAL MEDICINE

## 2024-04-18 PROCEDURE — 96377 APPLICATON ON-BODY INJECTOR: CPT | Mod: 59

## 2024-04-18 RX ORDER — DIPHENHYDRAMINE HYDROCHLORIDE 50 MG/ML
50 INJECTION INTRAMUSCULAR; INTRAVENOUS AS NEEDED
Status: DISCONTINUED | OUTPATIENT
Start: 2024-04-18 | End: 2024-04-18 | Stop reason: HOSPADM

## 2024-04-18 RX ORDER — ALBUTEROL SULFATE 0.83 MG/ML
3 SOLUTION RESPIRATORY (INHALATION) AS NEEDED
Status: DISCONTINUED | OUTPATIENT
Start: 2024-04-18 | End: 2024-04-18 | Stop reason: HOSPADM

## 2024-04-18 RX ORDER — FAMOTIDINE 10 MG/ML
20 INJECTION INTRAVENOUS ONCE AS NEEDED
Status: DISCONTINUED | OUTPATIENT
Start: 2024-04-18 | End: 2024-04-18 | Stop reason: HOSPADM

## 2024-04-18 RX ORDER — EPINEPHRINE 0.3 MG/.3ML
0.3 INJECTION SUBCUTANEOUS EVERY 5 MIN PRN
Status: DISCONTINUED | OUTPATIENT
Start: 2024-04-18 | End: 2024-04-18 | Stop reason: HOSPADM

## 2024-04-18 RX ORDER — PALONOSETRON 0.05 MG/ML
0.25 INJECTION, SOLUTION INTRAVENOUS ONCE
Status: COMPLETED | OUTPATIENT
Start: 2024-04-18 | End: 2024-04-18

## 2024-04-18 RX ORDER — ACETAMINOPHEN 325 MG/1
650 TABLET ORAL ONCE
Status: COMPLETED | OUTPATIENT
Start: 2024-04-18 | End: 2024-04-18

## 2024-04-18 RX ORDER — DOXORUBICIN HYDROCHLORIDE 2 MG/ML
50 INJECTION, SOLUTION INTRAVENOUS ONCE
Status: COMPLETED | OUTPATIENT
Start: 2024-04-18 | End: 2024-04-18

## 2024-04-18 RX ORDER — PROCHLORPERAZINE MALEATE 10 MG
10 TABLET ORAL EVERY 6 HOURS PRN
Status: DISCONTINUED | OUTPATIENT
Start: 2024-04-18 | End: 2024-04-18 | Stop reason: HOSPADM

## 2024-04-18 RX ORDER — HEPARIN 100 UNIT/ML
500 SYRINGE INTRAVENOUS AS NEEDED
Status: DISCONTINUED | OUTPATIENT
Start: 2024-04-18 | End: 2024-04-18 | Stop reason: HOSPADM

## 2024-04-18 RX ORDER — HEPARIN SODIUM,PORCINE/PF 10 UNIT/ML
50 SYRINGE (ML) INTRAVENOUS AS NEEDED
Status: CANCELLED | OUTPATIENT
Start: 2024-04-18

## 2024-04-18 RX ORDER — HEPARIN 100 UNIT/ML
500 SYRINGE INTRAVENOUS AS NEEDED
Status: CANCELLED | OUTPATIENT
Start: 2024-04-18

## 2024-04-18 RX ORDER — PROCHLORPERAZINE EDISYLATE 5 MG/ML
10 INJECTION INTRAMUSCULAR; INTRAVENOUS EVERY 6 HOURS PRN
Status: DISCONTINUED | OUTPATIENT
Start: 2024-04-18 | End: 2024-04-18 | Stop reason: HOSPADM

## 2024-04-18 RX ORDER — DIPHENHYDRAMINE HCL 25 MG
50 CAPSULE ORAL ONCE
Status: COMPLETED | OUTPATIENT
Start: 2024-04-18 | End: 2024-04-18

## 2024-04-18 RX ORDER — LEVOFLOXACIN 500 MG/1
500 TABLET, FILM COATED ORAL DAILY
Qty: 10 TABLET | Refills: 0 | Status: SHIPPED | OUTPATIENT
Start: 2024-04-18 | End: 2024-04-28

## 2024-04-18 RX ADMIN — VINCRISTINE SULFATE 2 MG: 1 INJECTION, SOLUTION INTRAVENOUS at 12:27

## 2024-04-18 RX ADMIN — HEPARIN 500 UNITS: 100 SYRINGE at 16:29

## 2024-04-18 RX ADMIN — DOXORUBICIN HYDROCHLORIDE 100 MG: 2 INJECTION, SOLUTION INTRAVENOUS at 12:01

## 2024-04-18 RX ADMIN — FOSAPREPITANT 150 MG: 150 INJECTION, POWDER, LYOPHILIZED, FOR SOLUTION INTRAVENOUS at 10:14

## 2024-04-18 RX ADMIN — ACETAMINOPHEN 650 MG: 325 TABLET ORAL at 12:30

## 2024-04-18 RX ADMIN — DIPHENHYDRAMINE HYDROCHLORIDE 50 MG: 25 CAPSULE ORAL at 12:30

## 2024-04-18 RX ADMIN — SODIUM CHLORIDE 753.75 MG: 9 INJECTION, SOLUTION INTRAVENOUS at 13:28

## 2024-04-18 RX ADMIN — PEGFILGRASTIM 6 MG: KIT SUBCUTANEOUS at 14:03

## 2024-04-18 RX ADMIN — PALONOSETRON 250 MCG: 0.05 INJECTION, SOLUTION INTRAVENOUS at 09:59

## 2024-04-18 RX ADMIN — CYCLOPHOSPHAMIDE 1500 MG: 1 INJECTION, POWDER, FOR SOLUTION INTRAVENOUS; ORAL at 12:47

## 2024-04-18 ASSESSMENT — PAIN SCALES - GENERAL: PAINLEVEL: 0-NO PAIN

## 2024-04-18 NOTE — PATIENT INSTRUCTIONS
Your neulasta onpro was placed at 2:10 PM Medication delivery will start at 5:10 PM it will take 45 minutes to complete, during this time the green light flash. The delivery should be completed by 6:10 PM  Once dose delivery is complete, you will hear one long beep and the light will turn solid green. To confirm dose delivery, please check the black line on the injector to make sure it is on empty. To remove, gently peel the tape off and dispose off the injector.   Check the status light occasionally to make sure it flashes green, if at any time you notice that the light is red, please call your healthcare provider at [phone].  One of the most common S/E of neulasta is bone pain, to help minimize discomfort, suggestion made to take over the counter Claritin once a day.     Please take an additional potassium tablet today for your low potassium level     Try using magnesium citrate for the constipation  
58

## 2024-04-18 NOTE — PROGRESS NOTES
"NUTRITION Follow-up NOTE    Nutrition Assessment     Reason for Visit:  Kellie Rae is a 74 y.o. female who presents for diffuse large B cell lymphoma.   Hx: HTN, HLD  Current tx: RCHOP  Started: 3/7/24    Nutrition consult for weight loss.  Visited with pt today during treatment for follow-up     Lab Results   Component Value Date/Time    GLUCOSE 97 04/18/2024 0908     04/18/2024 0908    K 3.3 (L) 04/18/2024 0908     04/18/2024 0908    CO2 32 04/18/2024 0908    ANIONGAP 10 04/18/2024 0908    BUN 11 04/18/2024 0908    CREATININE 0.73 04/18/2024 0908    EGFR 85 04/18/2024 0908    CALCIUM 8.4 (L) 04/18/2024 0908    ALBUMIN 3.4 04/18/2024 0908    ALKPHOS 101 04/18/2024 0908    PROT 5.6 (L) 04/18/2024 0908    AST 10 04/18/2024 0908    BILITOT 0.6 04/18/2024 0908    ALT 11 04/18/2024 0908     No results found for: \"VITD25\"    Anthropometrics:  Anthropometrics  Height: 162.6 cm (5' 4.02\")  Weight: 83.7 kg (184 lb 8.4 oz)  BMI (Calculated): 31.66  IBW/kg (Dietitian Calculated): 54.5 kg  Adjusted Body Weight (kg): 63 kg  Weight Change  Weight History / % Weight Change: Weight stable over the last few weeks; previously noted 4.4% loss in 2 weeks 6.2% loss in 1 month  Significant Weight Loss: Yes  Interpretation of Weight Loss: >5% in 1 month        Wt Readings from Last 10 Encounters:   04/18/24 83.7 kg (184 lb 8.4 oz)   04/18/24 83.7 kg (184 lb 8.4 oz)   03/28/24 83.6 kg (184 lb 4.9 oz)   03/28/24 83.6 kg (184 lb 4.9 oz)   03/14/24 86.2 kg (190 lb)   03/07/24 87.5 kg (192 lb 12.7 oz)   03/07/24 87.5 kg (192 lb 12.7 oz)   03/05/24 86.2 kg (190 lb)   02/23/24 89.1 kg (196 lb 6.9 oz)   02/12/24 90.4 kg (199 lb 4.7 oz)        Food And Nutrient Intake:  Food and Nutrient History  Food and Nutrient History: Taste changes, doesnt like foods; Magic mouth rinse- only able to do 2x swish and swallow- caused nausea. Notices some slight fluid retention in evening; cannot tolerate cruciferous veggies, beans, fresh " "fruit.  Energy Intake: Fair 50-75 %  Fluid Intake: ~ 1 20oz bottle gatorade per day; 2-3 24oz bottles daily  GI Symptoms: constipation (did have bowel movement)  Oral Problems: dysgeusia, xerostomia     Food Intake  Amount of Food: Sleeping more during day; not much at night due to frequent urge to go to bathroom  Meal 1: Able to eat most days something; crossants with egg and sausage- taste good.  Meal 3: D = 1/2 sandwich  Snacks: PB pretzels a good handful                                                 Medication and Complementary/Alternative Medicine Use  OTC Medication Use: Stool softeners and enemas      Nutrition Focused Physical Exam Findings:  Completed 3/7/24                        Energy Needs  Calculated Energy Needs Using Equations  Height: 162.6 cm (5' 4.02\")  Weight Used for Equation Calculations: 63 kg (138 lb 14.2 oz) (adjusted weight)  [place]- [place] Equation (Overweight or Obese Patients): 1105  Estimated Energy Needs  Total Energy Estimated Needs (kCal): 1890 kCal  Total Estimated Energy Need per Day (kCal/kg): 30 kCal/kg  Estimated Fluid Needs  Total Fluid Estimated Needs (mL): 1890 mL  Total Fluid Estimated Needs (mL/kg): 30 mL/kg  Method for Estimating Needs: additional needs for diarrhea/vomiting  Estimated Protein Needs  Total Protein Estimated Needs (g): 75.6 g  Total Protein Estimated Needs (g/kg): 1.2 g/kg  Method for Estimating Needs: up to 88g/day        Nutrition Diagnosis   Malnutrition Diagnosis  Patient has Malnutrition Diagnosis: Yes  Diagnosis Status: Ongoing  Malnutrition Diagnosis: Severe malnutrition related to chronic disease or condition  As Evidenced by: 6.2% weight loss in 1 month, oral intake < 50-75% of needs for > 1 week, pt reporting nutrition impact symptoms of mucositis, dysgeusia, xerostomia, constipation, nausea, anorexia, while undergoing treatment for DLBCL.    Nutrition Diagnosis  Patient has Nutrition Diagnosis: Yes  Diagnosis Status (1): Ongoing  Nutrition " Diagnosis 1: Predicted inadequate energy intake  Related to (1): potential for nutrition impact symptoms with treatment  As Evidenced by (1): pt undergoing chemotherapy for DLBCL.    Nutrition Interventions/Recommendations   Nutrition Prescription  Individualized Nutrition Prescription Provided for : Nutrition during cancer treatment, management of nutrition impact symptoms.    Food and Nutrition Delivery  Food and Nutrition Delivery  Meals & Snacks: Energy-modified diet, Protein-modified diet, Fluid-modified diet  Goals: Encouraged adequate calories/ protein to maintain weight. Recommend at least 3 meals a day; use of snacks as needed in place of meals and instead of skipping.  Medical Food Supplement: Commercial beverage  Goals: Likes fairlife milk- does not like ONS; encouraged use    Nutrition Education  Nutrition Education  Nutrition Education Content: Content related nutrition education  Goals: High calorie high protein diet    Coordination of Care       There are no Patient Instructions on file for this visit.    Nutrition Monitoring and Evaluation   Food/Nutrient Related History Monitoring  Monitoring and Evaluation Plan: Energy intake, Fluid intake, Meal/snack pattern, Protein intake, Amount of food  Energy Intake: Estimated energy intake  Criteria: Meet at least 75% of caloric needs  Fluid Intake: Estimated fluid intake  Criteria: Maintain hydration; 63+oz daily  Amount of Food: Medical food intake  Criteria: lower lactose options as able  Meal/Snack Pattern: Estimated meal and snack pattern  Criteria: 4-6 meals/snacks per day at least 50% of the time  Estimated protein intake: Estimated protein intake  Criteria: Meet at least 75% of protein needs orally  Body Composition/Growth/Weight History  Monitoring and Evaluation Plan: Weight  Weight: Measured weight  Criteria: Maintain weight          Time Spent  Prep time on day of patient encounter: 5 minutes  Time spent directly with patient, family or  caregiver: 10 minutes  Additional Time Spent on Patient Care Activities: 0 minutes  Documentation Time: 10 minutes  Other Time Spent: 0 minutes  Total: 25 minutes

## 2024-04-18 NOTE — PATIENT INSTRUCTIONS
Today you met with Dr. Rowell.  He will proceed with your treatment today.  He would like a CT scan to re-image your R sided pain.  We will do a virtual call with you a few days after that to discuss the results.  He is sending a prescription for an antibiotic for a possible infection in that area to your local pharmacy.  Glad you mouth concerns have resolved and you spoke with April our Dietician today.  Please call the office for any questions or concerns.  Review your schedule prior to leaving [facility] [facility].  We ask that you notify us 5-7 days before your medications need refilled.   [facility] is strongly encouraging all patients to access their MyChart for all results and to communicate with their provider for non-urgent messages.  If an urgent/same day/sick concern response is needed, please call the office at [phone]. Thank You!

## 2024-04-18 NOTE — PROGRESS NOTES
Patient ID: Kellie Rae is a 74 y.o. female.  Referring Physician: Contreras Rowell MD  [id number] [place] [address]  Primary Care Provider: Douglas Yañez MD  Visit Type:  Follow Up     Subjective    HPI  Referred by primary care doctor because of the results of CT chest abdomen and pelvis showing a soft tissue mass lesion within the mesentery with multiple retroperitoneal and mesenteric lymph and adenopathy.  Radiographically the findings could represent lymphoma.  I was able to elicit from had that his symptoms began about 5 years ago with pain in the abdomen and the back she subsequently was diagnosed with gout and it is not clear the etiology of the of the gout currently on allopurinol and colchicine.  She is also on nonsteroidals anti-inflammatory agents as well.  She was referred to medical oncology because a CT scan of the abdomen showed intra-abdominal lesions reminiscent of lymphadenopathy/Lymphoma.  Biopsy has been ordered to confirm the diagnosis.    FINAL DIAGNOSIS      A. SOFT TISSUE MASS, MESENTERY, BIOPSY:      DIFFUSE LARGE B CELL LYMPHOMA, FINAL CLASSIFICATION PENDING GENETIC STUDIES, SEE NOTE.     SUBTYPE BY PALMA CRITERIA:  NON GERMINAL CENTER CELL TYPE     MYC/BCL-2 EXPRESSION: NOT A DOUBLE EXPRESSOR (MYC-,BCL-2+)     Review of Systems   Constitutional: Negative.    HENT:  Negative.     Eyes: Negative.    Respiratory: Negative.     Cardiovascular: Negative.    Gastrointestinal: Negative.       PERITONEUM/RETROPERITONEUM/LYMPH NODES: CT 3/14/2024  No ascites or free air, no fluid collection.  Celiac axis adenopathy with largest node 3.2 cm, decreased from 4.5 cm on prior. Periportal adenopathy with largest alis mass 3.1 cm, previously 3.8 cm. 6.8 cm mass in right lower mesentery, previously 8.2 cm. The alis masses  also appear to have more fluid density centrally. Several mildly enlarged periaortic lymph nodes, difficult to measure, appear to be decreasing in  volume compared to prior.      BONES AND ABDOMINAL WALL:  No suspicious osseous lesions are identified.  The abdominal wall soft tissues appear normal.      IMPRESSION:  No acute finding. Evidence of positive treatment response to chemotherapy with some  reduction in volume of abdominal adenopathy. Mesenteric mass in right lower abdomen is is of lower density suggesting necrosis.      Signed by: Melquiades Bales 3/14/2024  Objective   BSA: 1.94 meters squared  /71 (BP Location: Left arm, Patient Position: Sitting, BP Cuff Size: Adult)   Pulse 75   Temp 36.9 °C (98.4 °F) (Temporal)   Resp 18   Wt 83.7 kg (184 lb 8.4 oz)   SpO2 96%   BMI 31.66 kg/m²      has a past medical history of GERD (gastroesophageal reflux disease), Gout, Hyperlipidemia, Hypertension, Hypothyroidism, and Sleep apnea.   has a past surgical history that includes Appendectomy; Colonoscopy; and Cataract extraction (Bilateral).  No family history on file.  Oncology History   Lymphoma (Multi)   2/23/2024 Initial Diagnosis    Lymphoma (CMS/HCC)     3/7/2024 -  Chemotherapy    R-CHOP (Cyclophosphamide / DOXOrubicin / VinCRIStine / PredniSONE) + RiTUXimab, 21 Day Cycles         Kellie Rae  reports that she quit smoking about 25 years ago. Her smoking use included cigarettes. She started smoking about 56 years ago. She has a 31 pack-year smoking history. She has never used smokeless tobacco.  She  reports no history of alcohol use.  She  reports no history of drug use.    Physical Exam  Constitutional:       Appearance: Normal appearance.   HENT:      Head: Normocephalic and atraumatic.   Eyes:      Extraocular Movements: Extraocular movements intact.      Pupils: Pupils are equal, round, and reactive to light.   Neurological:      Mental Status: She is alert.         WBC   Date/Time Value Ref Range Status   03/28/2024 09:05 AM 12.7 (H) 4.4 - 11.3 x10*3/uL Final   03/14/2024 12:16 PM 0.5 (LL) 4.4 - 11.3 x10*3/uL Final   03/07/2024 08:57  AM 11.6 (H) 4.4 - 11.3 x10*3/uL Final     nRBC   Date Value Ref Range Status   03/14/2024 0.0 0.0 - 0.0 /100 WBCs Final   02/14/2024 0.0 0.0 - 0.0 /100 WBCs Final   09/24/2019 0 0 /100 WBC Final   09/10/2019 0 0 /100 WBC Final   08/27/2019 0 0 /100 WBC Final     RBC   Date Value Ref Range Status   03/28/2024 4.15 4.00 - 5.20 x10*6/uL Final   03/14/2024 4.49 4.00 - 5.20 x10*6/uL Final   03/07/2024 4.34 4.00 - 5.20 x10*6/uL Final     Hemoglobin   Date Value Ref Range Status   03/28/2024 11.0 (L) 12.0 - 16.0 g/dL Final   03/14/2024 11.8 (L) 12.0 - 16.0 g/dL Final   03/07/2024 11.7 (L) 12.0 - 16.0 g/dL Final     Hematocrit   Date Value Ref Range Status   03/28/2024 35.4 (L) 36.0 - 46.0 % Final   03/14/2024 37.7 36.0 - 46.0 % Final   03/07/2024 37.5 36.0 - 46.0 % Final     MCV   Date/Time Value Ref Range Status   03/28/2024 09:05 AM 85 80 - 100 fL Final   03/14/2024 12:16 PM 84 80 - 100 fL Final   03/07/2024 08:57 AM 86 80 - 100 fL Final     MCH   Date/Time Value Ref Range Status   03/28/2024 09:05 AM 26.5 26.0 - 34.0 pg Final   03/14/2024 12:16 PM 26.3 26.0 - 34.0 pg Final   03/07/2024 08:57 AM 27.0 26.0 - 34.0 pg Final     MCHC   Date/Time Value Ref Range Status   03/28/2024 09:05 AM 31.1 (L) 32.0 - 36.0 g/dL Final   03/14/2024 12:16 PM 31.3 (L) 32.0 - 36.0 g/dL Final   03/07/2024 08:57 AM 31.2 (L) 32.0 - 36.0 g/dL Final     RDW   Date/Time Value Ref Range Status   03/28/2024 09:05 AM 16.4 (H) 11.5 - 14.5 % Final   03/14/2024 12:16 PM 15.1 (H) 11.5 - 14.5 % Final   03/07/2024 08:57 AM 14.9 (H) 11.5 - 14.5 % Final     Platelets   Date/Time Value Ref Range Status   03/28/2024 09:05  150 - 450 x10*3/uL Final     Comment:     Pt Hx   03/14/2024 12:16  150 - 450 x10*3/uL Final     Comment:     Platelet count verified by smear review.   03/07/2024 08:57  150 - 450 x10*3/uL Final     MPV   Date/Time Value Ref Range Status   09/24/2019 09:55 AM 10.2 7.0 - 12.6 CU Final   09/10/2019 10:30 AM 10.6 7.0 - 12.6 CU  Final   08/27/2019 09:53 AM 10.4 7.0 - 12.6 CU Final     Neutrophils %   Date/Time Value Ref Range Status   03/28/2024 09:05 AM 82.7 40.0 - 80.0 % Final   03/07/2024 08:57 AM 89.3 40.0 - 80.0 % Final   02/23/2024 10:57 AM 79.3 40.0 - 80.0 % Final     Immature Granulocytes %, Automated   Date/Time Value Ref Range Status   03/28/2024 09:05 AM 0.5 0.0 - 0.9 % Final     Comment:     Immature Granulocyte Count (IG) includes promyelocytes, myelocytes and metamyelocytes but does not include bands. Percent differential counts (%) should be interpreted in the context of the absolute cell counts (cells/UL).   03/14/2024 12:16 PM 0.0 0.0 - 0.9 % Final     Comment:     Immature Granulocyte Count (IG) includes promyelocytes, myelocytes and metamyelocytes but does not include bands. Percent differential counts (%) should be interpreted in the context of the absolute cell counts (cells/UL).   03/07/2024 08:57 AM 0.3 0.0 - 0.9 % Final     Comment:     Immature Granulocyte Count (IG) includes promyelocytes, myelocytes and metamyelocytes but does not include bands. Percent differential counts (%) should be interpreted in the context of the absolute cell counts (cells/UL).     Lymphocytes %, Manual   Date/Time Value Ref Range Status   03/14/2024 12:16 PM 90.0 13.0 - 44.0 % Final     Lymphocytes %   Date/Time Value Ref Range Status   03/28/2024 09:05 AM 8.2 13.0 - 44.0 % Final   03/07/2024 08:57 AM 7.4 13.0 - 44.0 % Final   02/23/2024 10:57 AM 12.0 13.0 - 44.0 % Final     Monocytes %, Manual   Date/Time Value Ref Range Status   03/14/2024 12:16 PM 8.0 2.0 - 10.0 % Final     Monocytes %   Date/Time Value Ref Range Status   03/28/2024 09:05 AM 7.5 2.0 - 10.0 % Final   03/07/2024 08:57 AM 2.5 2.0 - 10.0 % Final   02/23/2024 10:57 AM 6.7 2.0 - 10.0 % Final     Eosinophils %, Manual   Date/Time Value Ref Range Status   03/14/2024 12:16 PM 0.0 0.0 - 6.0 % Final     Eosinophils %   Date/Time Value Ref Range Status   03/28/2024 09:05 AM 0.6  0.0 - 6.0 % Final   03/07/2024 08:57 AM 0.4 0.0 - 6.0 % Final   02/23/2024 10:57 AM 1.3 0.0 - 6.0 % Final     Basophils %, Manual   Date/Time Value Ref Range Status   03/14/2024 12:16 PM 2.0 0.0 - 2.0 % Final     Basophils %   Date/Time Value Ref Range Status   03/28/2024 09:05 AM 0.5 0.0 - 2.0 % Final   03/07/2024 08:57 AM 0.1 0.0 - 2.0 % Final   02/23/2024 10:57 AM 0.1 0.0 - 2.0 % Final     Neutrophils Absolute   Date/Time Value Ref Range Status   03/28/2024 09:05 AM 10.53 (H) 1.60 - 5.50 x10*3/uL Final     Comment:     Percent differential counts (%) should be interpreted in the context of the absolute cell counts (cells/uL).   03/07/2024 08:57 AM 10.35 (H) 1.60 - 5.50 x10*3/uL Final     Comment:     Percent differential counts (%) should be interpreted in the context of the absolute cell counts (cells/uL).   02/23/2024 10:57 AM 9.48 (H) 1.60 - 5.50 x10*3/uL Final     Comment:     Percent differential counts (%) should be interpreted in the context of the absolute cell counts (cells/uL).     Immature Granulocytes Absolute, Automated   Date/Time Value Ref Range Status   03/28/2024 09:05 AM 0.07 0.00 - 0.50 x10*3/uL Final   03/14/2024 12:16 PM 0.00 0.00 - 0.50 x10*3/uL Final   03/07/2024 08:57 AM 0.03 0.00 - 0.50 x10*3/uL Final     Lymphocytes Absolute   Date/Time Value Ref Range Status   03/28/2024 09:05 AM 1.05 0.80 - 3.00 x10*3/uL Final   03/07/2024 08:57 AM 0.86 0.80 - 3.00 x10*3/uL Final   02/23/2024 10:57 AM 1.44 0.80 - 3.00 x10*3/uL Final     Monocytes Absolute   Date/Time Value Ref Range Status   03/28/2024 09:05 AM 0.95 (H) 0.05 - 0.80 x10*3/uL Final   03/07/2024 08:57 AM 0.29 0.05 - 0.80 x10*3/uL Final   02/23/2024 10:57 AM 0.80 0.05 - 0.80 x10*3/uL Final     Eosinophils Absolute   Date/Time Value Ref Range Status   03/28/2024 09:05 AM 0.08 0.00 - 0.40 x10*3/uL Final   03/07/2024 08:57 AM 0.05 0.00 - 0.40 x10*3/uL Final   02/23/2024 10:57 AM 0.16 0.00 - 0.40 x10*3/uL Final     Eosinophils Absolute, Manual    Date/Time Value Ref Range Status   03/14/2024 12:16 PM 0.00 0.00 - 0.40 x10*3/uL Final     Basophils Absolute   Date/Time Value Ref Range Status   03/28/2024 09:05 AM 0.06 0.00 - 0.10 x10*3/uL Final   03/07/2024 08:57 AM 0.01 0.00 - 0.10 x10*3/uL Final   02/23/2024 10:57 AM 0.01 0.00 - 0.10 x10*3/uL Final     Basophils Absolute, Manual   Date/Time Value Ref Range Status   03/14/2024 12:16 PM 0.01 0.00 - 0.10 x10*3/uL Final       Assessment/Plan      Patient seen today for cycle 2 Rituxan CHOP for diffuse large B-cell lymphoma.  Has been started on allopurinol.  Has Mucositis : BMX prescribed. No evidence of infection port site not inflamed.  Cleared for chemotherapy    Complaining of abdominal pain more generalized but more focused in the right lower quadrant.  Previous CT scan shows that that area showed mesenteric necrosis.  This was thought to be due to chemotherapy response but given the fact that this pain is persisting I have ordered a repeat CT scan and this will be evaluated again.  If indicated PET CT scan and GI will be consulted in that regard. CEA ordered    C3 CHOP rituxan: Cleared for chemotherapy today: RTC 6 weeks.     Diagnoses and all orders for this visit:  Diffuse large B-cell lymphoma, unspecified body region (Multi)  -     Clinic Appointment Request Chemo Follow Up  -     Clinic Appointment Request  -     CT chest abdomen pelvis w IV contrast; Future  -     levoFLOXacin (Levaquin) 500 mg tablet; Take 1 tablet (500 mg) by mouth once daily for 10 days.  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

## 2024-04-19 ENCOUNTER — INFUSION (OUTPATIENT)
Dept: HEMATOLOGY/ONCOLOGY | Facility: CLINIC | Age: 74
End: 2024-04-19
Payer: MEDICARE

## 2024-04-19 ENCOUNTER — HOSPITAL ENCOUNTER (OUTPATIENT)
Dept: RADIOLOGY | Facility: CLINIC | Age: 74
Discharge: HOME | End: 2024-04-19
Payer: MEDICARE

## 2024-04-19 ENCOUNTER — APPOINTMENT (OUTPATIENT)
Dept: RADIOLOGY | Facility: HOSPITAL | Age: 74
End: 2024-04-19
Payer: MEDICARE

## 2024-04-19 DIAGNOSIS — C85.10 B-CELL LYMPHOMA, UNSPECIFIED B-CELL LYMPHOMA TYPE, UNSPECIFIED BODY REGION (MULTI): ICD-10-CM

## 2024-04-19 DIAGNOSIS — C83.30 DIFFUSE LARGE B-CELL LYMPHOMA, UNSPECIFIED BODY REGION (MULTI): ICD-10-CM

## 2024-04-19 DIAGNOSIS — C78.5 SECONDARY MALIGNANT NEOPLASM OF LARGE INTESTINE AND RECTUM (MULTI): ICD-10-CM

## 2024-04-19 LAB — CEA SERPL-MCNC: 2.3 UG/L

## 2024-04-19 PROCEDURE — 74177 CT ABD & PELVIS W/CONTRAST: CPT

## 2024-04-19 PROCEDURE — 74177 CT ABD & PELVIS W/CONTRAST: CPT | Performed by: RADIOLOGY

## 2024-04-19 PROCEDURE — 71260 CT THORAX DX C+: CPT | Performed by: RADIOLOGY

## 2024-04-19 PROCEDURE — 96523 IRRIG DRUG DELIVERY DEVICE: CPT

## 2024-04-19 PROCEDURE — 2550000001 HC RX 255 CONTRASTS: Performed by: INTERNAL MEDICINE

## 2024-04-19 PROCEDURE — 2500000004 HC RX 250 GENERAL PHARMACY W/ HCPCS (ALT 636 FOR OP/ED): Performed by: INTERNAL MEDICINE

## 2024-04-19 RX ORDER — HEPARIN SODIUM,PORCINE/PF 10 UNIT/ML
50 SYRINGE (ML) INTRAVENOUS AS NEEDED
Status: CANCELLED | OUTPATIENT
Start: 2024-04-19

## 2024-04-19 RX ORDER — HEPARIN 100 UNIT/ML
500 SYRINGE INTRAVENOUS AS NEEDED
Status: DISCONTINUED | OUTPATIENT
Start: 2024-04-19 | End: 2024-04-19 | Stop reason: HOSPADM

## 2024-04-19 RX ORDER — HEPARIN 100 UNIT/ML
500 SYRINGE INTRAVENOUS AS NEEDED
Status: CANCELLED | OUTPATIENT
Start: 2024-04-19

## 2024-04-19 RX ORDER — HEPARIN SODIUM,PORCINE/PF 10 UNIT/ML
50 SYRINGE (ML) INTRAVENOUS AS NEEDED
Status: DISCONTINUED | OUTPATIENT
Start: 2024-04-19 | End: 2024-04-19 | Stop reason: HOSPADM

## 2024-04-19 RX ADMIN — IOHEXOL 75 ML: 350 INJECTION, SOLUTION INTRAVENOUS at 10:24

## 2024-04-19 RX ADMIN — HEPARIN 500 UNITS: 100 SYRINGE at 10:05

## 2024-04-22 ASSESSMENT — ENCOUNTER SYMPTOMS
GASTROINTESTINAL NEGATIVE: 1
EYES NEGATIVE: 1
CONSTITUTIONAL NEGATIVE: 1
CARDIOVASCULAR NEGATIVE: 1
RESPIRATORY NEGATIVE: 1

## 2024-04-25 ENCOUNTER — HOSPITAL ENCOUNTER (EMERGENCY)
Facility: HOSPITAL | Age: 74
Discharge: HOME | End: 2024-04-25
Attending: STUDENT IN AN ORGANIZED HEALTH CARE EDUCATION/TRAINING PROGRAM
Payer: MEDICARE

## 2024-04-25 ENCOUNTER — APPOINTMENT (OUTPATIENT)
Dept: RADIOLOGY | Facility: HOSPITAL | Age: 74
End: 2024-04-25
Payer: MEDICARE

## 2024-04-25 ENCOUNTER — OFFICE VISIT (OUTPATIENT)
Dept: HEMATOLOGY/ONCOLOGY | Facility: CLINIC | Age: 74
End: 2024-04-25
Payer: MEDICARE

## 2024-04-25 VITALS
TEMPERATURE: 98.2 F | SYSTOLIC BLOOD PRESSURE: 138 MMHG | DIASTOLIC BLOOD PRESSURE: 72 MMHG | BODY MASS INDEX: 36.89 KG/M2 | OXYGEN SATURATION: 99 % | HEIGHT: 59 IN | RESPIRATION RATE: 24 BRPM | HEART RATE: 82 BPM | WEIGHT: 182.98 LBS

## 2024-04-25 VITALS
SYSTOLIC BLOOD PRESSURE: 132 MMHG | RESPIRATION RATE: 20 BRPM | TEMPERATURE: 97.9 F | HEART RATE: 91 BPM | DIASTOLIC BLOOD PRESSURE: 83 MMHG | OXYGEN SATURATION: 98 % | WEIGHT: 183.09 LBS | BODY MASS INDEX: 31.41 KG/M2

## 2024-04-25 DIAGNOSIS — T36.5X5A ADVERSE EFFECT OF AMINOGLYCOSIDES, INITIAL ENCOUNTER: ICD-10-CM

## 2024-04-25 DIAGNOSIS — D70.1 CHEMOTHERAPY-INDUCED NEUTROPENIA (CMS-HCC): ICD-10-CM

## 2024-04-25 DIAGNOSIS — R06.02 SHORTNESS OF BREATH: Primary | ICD-10-CM

## 2024-04-25 DIAGNOSIS — C78.5 SECONDARY MALIGNANT NEOPLASM OF LARGE INTESTINE AND RECTUM (MULTI): ICD-10-CM

## 2024-04-25 DIAGNOSIS — C83.30 DIFFUSE LARGE B-CELL LYMPHOMA, UNSPECIFIED BODY REGION (MULTI): ICD-10-CM

## 2024-04-25 DIAGNOSIS — T45.1X5A CHEMOTHERAPY-INDUCED NEUTROPENIA (CMS-HCC): ICD-10-CM

## 2024-04-25 LAB
ABO GROUP (TYPE) IN BLOOD: NORMAL
ALBUMIN SERPL BCP-MCNC: 3.6 G/DL (ref 3.4–5)
ALP SERPL-CCNC: 95 U/L (ref 33–136)
ALT SERPL W P-5'-P-CCNC: 10 U/L (ref 7–45)
ANION GAP SERPL CALC-SCNC: 15 MMOL/L (ref 10–20)
ANTIBODY SCREEN: NORMAL
APPEARANCE UR: ABNORMAL
AST SERPL W P-5'-P-CCNC: 5 U/L (ref 9–39)
BASOPHILS # BLD AUTO: 0.01 X10*3/UL (ref 0–0.1)
BASOPHILS NFR BLD AUTO: 2.2 %
BILIRUB SERPL-MCNC: 1.1 MG/DL (ref 0–1.2)
BILIRUB UR STRIP.AUTO-MCNC: NEGATIVE MG/DL
BUN SERPL-MCNC: 14 MG/DL (ref 6–23)
CALCIUM SERPL-MCNC: 8.7 MG/DL (ref 8.6–10.3)
CARDIAC TROPONIN I PNL SERPL HS: 7 NG/L (ref 0–13)
CHLORIDE SERPL-SCNC: 97 MMOL/L (ref 98–107)
CO2 SERPL-SCNC: 30 MMOL/L (ref 21–32)
COLOR UR: YELLOW
CREAT SERPL-MCNC: 0.76 MG/DL (ref 0.5–1.05)
EGFRCR SERPLBLD CKD-EPI 2021: 81 ML/MIN/1.73M*2
EOSINOPHIL # BLD AUTO: 0.03 X10*3/UL (ref 0–0.4)
EOSINOPHIL NFR BLD AUTO: 6.5 %
ERYTHROCYTE [DISTWIDTH] IN BLOOD BY AUTOMATED COUNT: 17.5 % (ref 11.5–14.5)
GLUCOSE SERPL-MCNC: 94 MG/DL (ref 74–99)
GLUCOSE UR STRIP.AUTO-MCNC: NEGATIVE MG/DL
HCT VFR BLD AUTO: 31.2 % (ref 36–46)
HGB BLD-MCNC: 9.8 G/DL (ref 12–16)
IMM GRANULOCYTES # BLD AUTO: 0.01 X10*3/UL (ref 0–0.5)
IMM GRANULOCYTES NFR BLD AUTO: 2.2 % (ref 0–0.9)
KETONES UR STRIP.AUTO-MCNC: NEGATIVE MG/DL
LACTATE SERPL-SCNC: 1.2 MMOL/L (ref 0.4–2)
LEUKOCYTE ESTERASE UR QL STRIP.AUTO: NEGATIVE
LIPASE SERPL-CCNC: 7 U/L (ref 9–82)
LYMPHOCYTES # BLD AUTO: 0.31 X10*3/UL (ref 0.8–3)
LYMPHOCYTES NFR BLD AUTO: 67.4 %
MCH RBC QN AUTO: 27.1 PG (ref 26–34)
MCHC RBC AUTO-ENTMCNC: 31.4 G/DL (ref 32–36)
MCV RBC AUTO: 86 FL (ref 80–100)
MONOCYTES # BLD AUTO: 0.07 X10*3/UL (ref 0.05–0.8)
MONOCYTES NFR BLD AUTO: 15.2 %
NEUTROPHILS # BLD AUTO: 0.03 X10*3/UL (ref 1.6–5.5)
NEUTROPHILS NFR BLD AUTO: 6.5 %
NITRITE UR QL STRIP.AUTO: NEGATIVE
NRBC BLD-RTO: ABNORMAL /100{WBCS}
PH UR STRIP.AUTO: 5 [PH]
PLATELET # BLD AUTO: 58 X10*3/UL (ref 150–450)
POTASSIUM SERPL-SCNC: 3.6 MMOL/L (ref 3.5–5.3)
PROT SERPL-MCNC: 5.9 G/DL (ref 6.4–8.2)
PROT UR STRIP.AUTO-MCNC: NEGATIVE MG/DL
RBC # BLD AUTO: 3.62 X10*6/UL (ref 4–5.2)
RBC # UR STRIP.AUTO: NEGATIVE /UL
RBC MORPH BLD: NORMAL
RH FACTOR (ANTIGEN D): NORMAL
SODIUM SERPL-SCNC: 138 MMOL/L (ref 136–145)
SP GR UR STRIP.AUTO: 1.03
STOMATOCYTES BLD QL SMEAR: NORMAL
UROBILINOGEN UR STRIP.AUTO-MCNC: <2 MG/DL
WBC # BLD AUTO: 0.5 X10*3/UL (ref 4.4–11.3)

## 2024-04-25 PROCEDURE — 83690 ASSAY OF LIPASE: CPT | Performed by: STUDENT IN AN ORGANIZED HEALTH CARE EDUCATION/TRAINING PROGRAM

## 2024-04-25 PROCEDURE — 1159F MED LIST DOCD IN RCRD: CPT | Performed by: INTERNAL MEDICINE

## 2024-04-25 PROCEDURE — 84484 ASSAY OF TROPONIN QUANT: CPT | Performed by: STUDENT IN AN ORGANIZED HEALTH CARE EDUCATION/TRAINING PROGRAM

## 2024-04-25 PROCEDURE — 99285 EMERGENCY DEPT VISIT HI MDM: CPT | Mod: 25

## 2024-04-25 PROCEDURE — A4217 STERILE WATER/SALINE, 500 ML: HCPCS | Performed by: STUDENT IN AN ORGANIZED HEALTH CARE EDUCATION/TRAINING PROGRAM

## 2024-04-25 PROCEDURE — 2550000001 HC RX 255 CONTRASTS: Performed by: STUDENT IN AN ORGANIZED HEALTH CARE EDUCATION/TRAINING PROGRAM

## 2024-04-25 PROCEDURE — 3079F DIAST BP 80-89 MM HG: CPT | Performed by: INTERNAL MEDICINE

## 2024-04-25 PROCEDURE — 71275 CT ANGIOGRAPHY CHEST: CPT | Mod: FOREIGN READ | Performed by: RADIOLOGY

## 2024-04-25 PROCEDURE — 85025 COMPLETE CBC W/AUTO DIFF WBC: CPT | Performed by: INTERNAL MEDICINE

## 2024-04-25 PROCEDURE — 86901 BLOOD TYPING SEROLOGIC RH(D): CPT | Performed by: STUDENT IN AN ORGANIZED HEALTH CARE EDUCATION/TRAINING PROGRAM

## 2024-04-25 PROCEDURE — 71275 CT ANGIOGRAPHY CHEST: CPT

## 2024-04-25 PROCEDURE — 36415 COLL VENOUS BLD VENIPUNCTURE: CPT | Performed by: STUDENT IN AN ORGANIZED HEALTH CARE EDUCATION/TRAINING PROGRAM

## 2024-04-25 PROCEDURE — 96365 THER/PROPH/DIAG IV INF INIT: CPT | Mod: 59

## 2024-04-25 PROCEDURE — 99215 OFFICE O/P EST HI 40 MIN: CPT | Performed by: INTERNAL MEDICINE

## 2024-04-25 PROCEDURE — 83605 ASSAY OF LACTIC ACID: CPT | Performed by: STUDENT IN AN ORGANIZED HEALTH CARE EDUCATION/TRAINING PROGRAM

## 2024-04-25 PROCEDURE — 3075F SYST BP GE 130 - 139MM HG: CPT | Performed by: INTERNAL MEDICINE

## 2024-04-25 PROCEDURE — 80053 COMPREHEN METABOLIC PANEL: CPT | Performed by: INTERNAL MEDICINE

## 2024-04-25 PROCEDURE — 1126F AMNT PAIN NOTED NONE PRSNT: CPT | Performed by: INTERNAL MEDICINE

## 2024-04-25 PROCEDURE — 87040 BLOOD CULTURE FOR BACTERIA: CPT | Mod: GEALAB | Performed by: STUDENT IN AN ORGANIZED HEALTH CARE EDUCATION/TRAINING PROGRAM

## 2024-04-25 PROCEDURE — 71045 X-RAY EXAM CHEST 1 VIEW: CPT | Mod: FOREIGN READ | Performed by: RADIOLOGY

## 2024-04-25 PROCEDURE — 71045 X-RAY EXAM CHEST 1 VIEW: CPT

## 2024-04-25 PROCEDURE — 96375 TX/PRO/DX INJ NEW DRUG ADDON: CPT | Mod: 59

## 2024-04-25 PROCEDURE — 93005 ELECTROCARDIOGRAM TRACING: CPT

## 2024-04-25 PROCEDURE — 81003 URINALYSIS AUTO W/O SCOPE: CPT | Performed by: STUDENT IN AN ORGANIZED HEALTH CARE EDUCATION/TRAINING PROGRAM

## 2024-04-25 PROCEDURE — 2500000004 HC RX 250 GENERAL PHARMACY W/ HCPCS (ALT 636 FOR OP/ED): Performed by: STUDENT IN AN ORGANIZED HEALTH CARE EDUCATION/TRAINING PROGRAM

## 2024-04-25 RX ORDER — HEPARIN 100 UNIT/ML
5 SYRINGE INTRAVENOUS ONCE
Status: COMPLETED | OUTPATIENT
Start: 2024-04-25 | End: 2024-04-25

## 2024-04-25 RX ORDER — ONDANSETRON HYDROCHLORIDE 2 MG/ML
4 INJECTION, SOLUTION INTRAVENOUS ONCE
Status: COMPLETED | OUTPATIENT
Start: 2024-04-25 | End: 2024-04-25

## 2024-04-25 RX ORDER — HYDROMORPHONE HYDROCHLORIDE 1 MG/ML
1 INJECTION, SOLUTION INTRAMUSCULAR; INTRAVENOUS; SUBCUTANEOUS ONCE
Status: COMPLETED | OUTPATIENT
Start: 2024-04-25 | End: 2024-04-25

## 2024-04-25 RX ADMIN — PIPERACILLIN SODIUM AND TAZOBACTAM SODIUM 4.5 G: 4; .5 INJECTION, SOLUTION INTRAVENOUS at 16:14

## 2024-04-25 RX ADMIN — VANCOMYCIN HYDROCHLORIDE 2 G: 10 INJECTION, POWDER, LYOPHILIZED, FOR SOLUTION INTRAVENOUS at 17:27

## 2024-04-25 RX ADMIN — ONDANSETRON 4 MG: 2 INJECTION INTRAMUSCULAR; INTRAVENOUS at 20:04

## 2024-04-25 RX ADMIN — HEPARIN 500 UNITS: 100 SYRINGE at 20:05

## 2024-04-25 RX ADMIN — IOHEXOL 60 ML: 350 INJECTION, SOLUTION INTRAVENOUS at 17:14

## 2024-04-25 RX ADMIN — HYDROMORPHONE HYDROCHLORIDE 1 MG: 1 INJECTION, SOLUTION INTRAMUSCULAR; INTRAVENOUS; SUBCUTANEOUS at 20:05

## 2024-04-25 ASSESSMENT — COLUMBIA-SUICIDE SEVERITY RATING SCALE - C-SSRS
1. IN THE PAST MONTH, HAVE YOU WISHED YOU WERE DEAD OR WISHED YOU COULD GO TO SLEEP AND NOT WAKE UP?: NO
2. HAVE YOU ACTUALLY HAD ANY THOUGHTS OF KILLING YOURSELF?: NO
6. HAVE YOU EVER DONE ANYTHING, STARTED TO DO ANYTHING, OR PREPARED TO DO ANYTHING TO END YOUR LIFE?: NO

## 2024-04-25 ASSESSMENT — PAIN - FUNCTIONAL ASSESSMENT: PAIN_FUNCTIONAL_ASSESSMENT: 0-10

## 2024-04-25 ASSESSMENT — PAIN SCALES - GENERAL: PAINLEVEL: 0-NO PAIN

## 2024-04-25 ASSESSMENT — PAIN DESCRIPTION - PROGRESSION: CLINICAL_PROGRESSION: NOT CHANGED

## 2024-04-25 NOTE — PROGRESS NOTES
Patient ID: Kellie Rae is a 74 y.o. female.  Referring Physician: Contreras Rowell MD  [id number] [place] [address]  Primary Care Provider: Douglas Yañez MD  Visit Type:  Follow Up     Subjective    HPI  Referred by primary care doctor because of the results of CT chest abdomen and pelvis showing a soft tissue mass lesion within the mesentery with multiple retroperitoneal and mesenteric lymph and adenopathy.  Radiographically the findings could represent lymphoma.  I was able to elicit from had that his symptoms began about 5 years ago with pain in the abdomen and the back she subsequently was diagnosed with gout and it is not clear the etiology of the of the gout currently on allopurinol and colchicine.  She is also on nonsteroidals anti-inflammatory agents as well.  She was referred to medical oncology because a CT scan of the abdomen showed intra-abdominal lesions reminiscent of lymphadenopathy/Lymphoma.  Biopsy has been ordered to confirm the diagnosis.    FINAL DIAGNOSIS      A. SOFT TISSUE MASS, MESENTERY, BIOPSY:      DIFFUSE LARGE B CELL LYMPHOMA, FINAL CLASSIFICATION PENDING GENETIC STUDIES, SEE NOTE.     SUBTYPE BY PALMA CRITERIA:  NON GERMINAL CENTER CELL TYPE     MYC/BCL-2 EXPRESSION: NOT A DOUBLE EXPRESSOR (MYC-,BCL-2+)     Review of Systems   Constitutional: Negative.    HENT:  Negative.     Eyes: Negative.    Respiratory: Negative.     Cardiovascular: Negative.    Gastrointestinal: Negative.       PERITONEUM/RETROPERITONEUM/LYMPH NODES: CT 3/14/2024  No ascites or free air, no fluid collection.  Celiac axis adenopathy with largest node 3.2 cm, decreased from 4.5 cm on prior. Periportal adenopathy with largest alis mass 3.1 cm, previously 3.8 cm. 6.8 cm mass in right lower mesentery, previously 8.2 cm. The alis masses  also appear to have more fluid density centrally. Several mildly enlarged periaortic lymph nodes, difficult to measure, appear to be decreasing in  volume compared to prior.      BONES AND ABDOMINAL WALL:  No suspicious osseous lesions are identified.  The abdominal wall soft tissues appear normal.      IMPRESSION:  No acute finding. Evidence of positive treatment response to chemotherapy with some  reduction in volume of abdominal adenopathy. Mesenteric mass in right lower abdomen is is of lower density suggesting necrosis.      Signed by: Melquiades Bales 3/14/2024  Review of Systems - Oncology     Objective   BSA: There is no height or weight on file to calculate BSA.  There were no vitals taken for this visit.     has a past medical history of GERD (gastroesophageal reflux disease), Gout, Hyperlipidemia, Hypertension, Hypothyroidism, and Sleep apnea.   has a past surgical history that includes Appendectomy; Colonoscopy; and Cataract extraction (Bilateral).  No family history on file.  Oncology History   Lymphoma (Multi)   2/23/2024 Initial Diagnosis    Lymphoma (CMS/HCC)     3/7/2024 -  Chemotherapy    R-CHOP (Cyclophosphamide / DOXOrubicin / VinCRIStine / PredniSONE) + RiTUXimab, 21 Day Cycles         Kellie Rae  reports that she quit smoking about 25 years ago. Her smoking use included cigarettes. She started smoking about 56 years ago. She has a 31 pack-year smoking history. She has never used smokeless tobacco.  She  reports no history of alcohol use.  She  reports no history of drug use.    Physical Exam    WBC   Date/Time Value Ref Range Status   04/18/2024 09:08 AM 12.3 (H) 4.4 - 11.3 x10*3/uL Final   03/28/2024 09:05 AM 12.7 (H) 4.4 - 11.3 x10*3/uL Final   03/14/2024 12:16 PM 0.5 (LL) 4.4 - 11.3 x10*3/uL Final     nRBC   Date Value Ref Range Status   03/14/2024 0.0 0.0 - 0.0 /100 WBCs Final   02/14/2024 0.0 0.0 - 0.0 /100 WBCs Final   09/24/2019 0 0 /100 WBC Final   09/10/2019 0 0 /100 WBC Final   08/27/2019 0 0 /100 WBC Final     RBC   Date Value Ref Range Status   04/18/2024 3.66 (L) 4.00 - 5.20 x10*6/uL Final   03/28/2024 4.15 4.00 - 5.20  x10*6/uL Final   03/14/2024 4.49 4.00 - 5.20 x10*6/uL Final     Hemoglobin   Date Value Ref Range Status   04/18/2024 10.0 (L) 12.0 - 16.0 g/dL Final   03/28/2024 11.0 (L) 12.0 - 16.0 g/dL Final   03/14/2024 11.8 (L) 12.0 - 16.0 g/dL Final     Hematocrit   Date Value Ref Range Status   04/18/2024 32.4 (L) 36.0 - 46.0 % Final   03/28/2024 35.4 (L) 36.0 - 46.0 % Final   03/14/2024 37.7 36.0 - 46.0 % Final     MCV   Date/Time Value Ref Range Status   04/18/2024 09:08 AM 89 80 - 100 fL Final   03/28/2024 09:05 AM 85 80 - 100 fL Final   03/14/2024 12:16 PM 84 80 - 100 fL Final     MCH   Date/Time Value Ref Range Status   04/18/2024 09:08 AM 27.3 26.0 - 34.0 pg Final   03/28/2024 09:05 AM 26.5 26.0 - 34.0 pg Final   03/14/2024 12:16 PM 26.3 26.0 - 34.0 pg Final     MCHC   Date/Time Value Ref Range Status   04/18/2024 09:08 AM 30.9 (L) 32.0 - 36.0 g/dL Final   03/28/2024 09:05 AM 31.1 (L) 32.0 - 36.0 g/dL Final   03/14/2024 12:16 PM 31.3 (L) 32.0 - 36.0 g/dL Final     RDW   Date/Time Value Ref Range Status   04/18/2024 09:08 AM 18.9 (H) 11.5 - 14.5 % Final   03/28/2024 09:05 AM 16.4 (H) 11.5 - 14.5 % Final   03/14/2024 12:16 PM 15.1 (H) 11.5 - 14.5 % Final     Platelets   Date/Time Value Ref Range Status   04/18/2024 09:08  150 - 450 x10*3/uL Final   03/28/2024 09:05  150 - 450 x10*3/uL Final     Comment:     Pt Hx   03/14/2024 12:16  150 - 450 x10*3/uL Final     Comment:     Platelet count verified by smear review.     MPV   Date/Time Value Ref Range Status   09/24/2019 09:55 AM 10.2 7.0 - 12.6 CU Final   09/10/2019 10:30 AM 10.6 7.0 - 12.6 CU Final   08/27/2019 09:53 AM 10.4 7.0 - 12.6 CU Final     Neutrophils %   Date/Time Value Ref Range Status   04/18/2024 09:08 AM 82.8 40.0 - 80.0 % Final   03/28/2024 09:05 AM 82.7 40.0 - 80.0 % Final   03/07/2024 08:57 AM 89.3 40.0 - 80.0 % Final     Immature Granulocytes %, Automated   Date/Time Value Ref Range Status   04/18/2024 09:08 AM 0.5 0.0 - 0.9 % Final      Comment:     Immature Granulocyte Count (IG) includes promyelocytes, myelocytes and metamyelocytes but does not include bands. Percent differential counts (%) should be interpreted in the context of the absolute cell counts (cells/UL).   03/28/2024 09:05 AM 0.5 0.0 - 0.9 % Final     Comment:     Immature Granulocyte Count (IG) includes promyelocytes, myelocytes and metamyelocytes but does not include bands. Percent differential counts (%) should be interpreted in the context of the absolute cell counts (cells/UL).   03/14/2024 12:16 PM 0.0 0.0 - 0.9 % Final     Comment:     Immature Granulocyte Count (IG) includes promyelocytes, myelocytes and metamyelocytes but does not include bands. Percent differential counts (%) should be interpreted in the context of the absolute cell counts (cells/UL).     Lymphocytes %, Manual   Date/Time Value Ref Range Status   03/14/2024 12:16 PM 90.0 13.0 - 44.0 % Final     Lymphocytes %   Date/Time Value Ref Range Status   04/18/2024 09:08 AM 9.5 13.0 - 44.0 % Final   03/28/2024 09:05 AM 8.2 13.0 - 44.0 % Final   03/07/2024 08:57 AM 7.4 13.0 - 44.0 % Final     Monocytes %, Manual   Date/Time Value Ref Range Status   03/14/2024 12:16 PM 8.0 2.0 - 10.0 % Final     Monocytes %   Date/Time Value Ref Range Status   04/18/2024 09:08 AM 6.7 2.0 - 10.0 % Final   03/28/2024 09:05 AM 7.5 2.0 - 10.0 % Final   03/07/2024 08:57 AM 2.5 2.0 - 10.0 % Final     Eosinophils %, Manual   Date/Time Value Ref Range Status   03/14/2024 12:16 PM 0.0 0.0 - 6.0 % Final     Eosinophils %   Date/Time Value Ref Range Status   04/18/2024 09:08 AM 0.3 0.0 - 6.0 % Final   03/28/2024 09:05 AM 0.6 0.0 - 6.0 % Final   03/07/2024 08:57 AM 0.4 0.0 - 6.0 % Final     Basophils %, Manual   Date/Time Value Ref Range Status   03/14/2024 12:16 PM 2.0 0.0 - 2.0 % Final     Basophils %   Date/Time Value Ref Range Status   04/18/2024 09:08 AM 0.2 0.0 - 2.0 % Final   03/28/2024 09:05 AM 0.5 0.0 - 2.0 % Final   03/07/2024 08:57 AM  0.1 0.0 - 2.0 % Final     Neutrophils Absolute   Date/Time Value Ref Range Status   04/18/2024 09:08 AM 10.14 (H) 1.60 - 5.50 x10*3/uL Final     Comment:     Percent differential counts (%) should be interpreted in the context of the absolute cell counts (cells/uL).   03/28/2024 09:05 AM 10.53 (H) 1.60 - 5.50 x10*3/uL Final     Comment:     Percent differential counts (%) should be interpreted in the context of the absolute cell counts (cells/uL).   03/07/2024 08:57 AM 10.35 (H) 1.60 - 5.50 x10*3/uL Final     Comment:     Percent differential counts (%) should be interpreted in the context of the absolute cell counts (cells/uL).     Immature Granulocytes Absolute, Automated   Date/Time Value Ref Range Status   04/18/2024 09:08 AM 0.06 0.00 - 0.50 x10*3/uL Final   03/28/2024 09:05 AM 0.07 0.00 - 0.50 x10*3/uL Final   03/14/2024 12:16 PM 0.00 0.00 - 0.50 x10*3/uL Final     Lymphocytes Absolute   Date/Time Value Ref Range Status   04/18/2024 09:08 AM 1.17 0.80 - 3.00 x10*3/uL Final   03/28/2024 09:05 AM 1.05 0.80 - 3.00 x10*3/uL Final   03/07/2024 08:57 AM 0.86 0.80 - 3.00 x10*3/uL Final     Monocytes Absolute   Date/Time Value Ref Range Status   04/18/2024 09:08 AM 0.82 (H) 0.05 - 0.80 x10*3/uL Final   03/28/2024 09:05 AM 0.95 (H) 0.05 - 0.80 x10*3/uL Final   03/07/2024 08:57 AM 0.29 0.05 - 0.80 x10*3/uL Final     Eosinophils Absolute   Date/Time Value Ref Range Status   04/18/2024 09:08 AM 0.04 0.00 - 0.40 x10*3/uL Final   03/28/2024 09:05 AM 0.08 0.00 - 0.40 x10*3/uL Final   03/07/2024 08:57 AM 0.05 0.00 - 0.40 x10*3/uL Final     Eosinophils Absolute, Manual   Date/Time Value Ref Range Status   03/14/2024 12:16 PM 0.00 0.00 - 0.40 x10*3/uL Final     Basophils Absolute   Date/Time Value Ref Range Status   04/18/2024 09:08 AM 0.03 0.00 - 0.10 x10*3/uL Final   03/28/2024 09:05 AM 0.06 0.00 - 0.10 x10*3/uL Final   03/07/2024 08:57 AM 0.01 0.00 - 0.10 x10*3/uL Final     Basophils Absolute, Manual   Date/Time Value Ref  Range Status   03/14/2024 12:16 PM 0.01 0.00 - 0.10 x10*3/uL Final     Celiac axis adenopathy appears markedly decreased in size from the  prior study. Previously largest celiac axis lymph node measures 2.0 x  1.8 cm (previously 3.2 x 3.0 cm).      Periportal adenopathy appears markedly improved and not well seen on  the current examination.      Continued decrease in size of right lower mesentery mass which now  measures approximately 4.9 x 3.3 cm (previously 6.0 x 4.0 cm).      Retroperitoneal lymph nodes do not appear significantly enlarged.      The bladder is well distended with no gross wall thickening.      The visualized osseous structures are intact.      IMPRESSION:  1.  No evidence for acute thoracic pathology. No acute consolidation  or thoracic lymphadenopathy.      2. Diverticulosis of the colon. There is mild inflammatory stranding  surrounding the mid sigmoid colon consistent with mild acute  diverticulitis. No collections or abscess is seen.      3. Continued improvement in regards to decreasing lymphadenopathy  within the abdomen and pelvis as described in the body of the report.      Signed by: Brando Cortez 4/19/2024    Assessment/Plan      RTC Second week July: C4 5/9/2024: Proceed with management as planned:Good response to therapy.     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

## 2024-04-25 NOTE — PATIENT INSTRUCTIONS
Today you met with Dr. Rowell and reviewed your recent CT.  Due to the Shortness Of Breath, you had labs drawn today and a stat echo was ordered.  You will have a virtual with Dr. Rowell the day after that gets scheduled.      CHANGE OF PLANS-  After receiving your cbc result, you were transported to the ER.  Report called to triage and Dr. Rowell will call the provider.

## 2024-04-26 LAB — HOLD SPECIMEN: NORMAL

## 2024-04-26 NOTE — ED PROVIDER NOTES
CC: Shortness of Breath (Pt sent from [facility] for increased shortness of breath and rule out infection)     HPI:  Patient is a 74-year-old female with a history of B-cell lymphoma on chemotherapy received her last chemotherapy on Thursday but reports also receiving Neulasta at that time was found to be neutropenic at the outpatient oncology office.  Patient endorsed increased shortness of breath and therefore was encouraged to come to the emergency department for evaluation.  Had a CT of her chest abdomen pelvis 6 days ago but the symptoms started 3 days ago.  She did have an echocardiogram that was reassuring back in February.  Patient denies fever or sick symptoms however  at bedside does states she coughs occasionally.    Records Reviewed:  Recent available ED and inpatient notes reviewed in EMR.    PMHx/PSHx:  Per HPI.   - has a past medical history of GERD (gastroesophageal reflux disease), Gout, Hyperlipidemia, Hypertension, Hypothyroidism, and Sleep apnea.  - has a past surgical history that includes Appendectomy; Colonoscopy; and Cataract extraction (Bilateral).  - has Lymphoma (Multi); Atherosclerotic heart disease of native coronary artery without angina pectoris; Athscl heart disease of native coronary artery w/o ang pctrs; Encounter for screening for cardiovascular disorders; Encounter for screening for lipoid disorders; Other osteoporosis without current pathological fracture; Cigarette nicotine dependence in remission; Dyspnea; Essential hypertension; Hyperlipidemia; Hypothyroidism; Idiopathic chronic gout of multiple sites with tophus; Lower abdominal pain; Mass of adrenal gland (Multi); Mildly obese; Morbidly obese (Multi); BENNIE (obstructive sleep apnea); and Chronic bronchitis (Multi) on their problem list.    Medications:  Reviewed in EMR. See EMR for complete list of medications and doses.    Allergies:  Patient has no known allergies.    Social History:  - Tobacco:  reports that she quit  smoking about 25 years ago. Her smoking use included cigarettes. She started smoking about 56 years ago. She has a 31 pack-year smoking history. She has never used smokeless tobacco.   - Alcohol:  reports no history of alcohol use.   - Illicit Drugs:  reports no history of drug use.     ROS:  Per HPI.       ???????????????????????????????????????????????????????????????  Triage Vitals:  T 37.8 °C (100 °F)  HR 89  /73  RR (!) 28  O2 98 % None (Room air)    Physical Exam  ???????????????????????????????????????????????????????????????  GEN: Chronically ill-appearing  HEAD: atraumatic  EYES: PERRL, EOMI, no scleral icterus  NECK: no JVD  CVS/CHEST: reg rate, nl rhythm  PULM: CTA b/l no wheezes, crackles, or rhonchi.  Tachypneic  GI: soft, NT/ND, no rebound or guarding   EXT: no LE edema, 2+ periph pulses in bilat radial and DP   NEURO: Awake and alert, moving all extremities equally and spontaneously  SKIN: warm, dry  PSYCH: AAOx3 answers questions appropriately    EKG:  EKG read by me reviewed by me is normal sinus rhythm at 74 bpm.  Normal axis.  U wave appreciated.  No significant ST segment elevation or depression.  T wave inversion noted in lead III that does appear new.    Assessment and Plan:  Patient is a 74-year-old female who presents emergency department from the outpatient oncology office secondary to shortness of breath.  Lungs are clear bilaterally.  She was neutropenic and covered with a dose of broad-spectrum antibiotics although no fever.  CT PE obtained and negative for pulmonary embolism.  Spoke to primary oncologist who wanted to rule out pulmonary embolism and can follow-up blood cultures and then outpatient.  She was originally sent here to rule out an infection.  EKG shows no ST segment elevation.  Patient was endorsing some mild chest pain associated with the shortness of breath and therefore troponin added onto her labs.  Patient was discharged prior to results after speaking with her  oncologist who will follow-up for outpatient workup and is already ordered an outpatient echocardiogram.  Patient states her oncologist promised her that she does not have to be admitted to the hospital and is requesting discharge.  If troponin is significantly abnormal we will call patient back however do believe patient warrants echocardiogram for further workup and evaluation regarding shortness of breath.  Patient expressed understanding and is agreeable to plan.  Patient adamant that she would like discharge however given strict return precautions if she has severe chest pain or shortness of breath.  Patient expressed understanding and is agreeable to plan.    Troponin returned at 7.  Symptoms been going on for 3 days.  Unlikely cardiogenic secondary to MI.  Patient will follow-up for outpatient echocardiogram and will follow closely with her outpatient oncologist.  Patient and family at bedside expressed understanding and agreeable with the plan.    ED Course:  Diagnoses as of 04/25/24 2337   Shortness of breath   Chemotherapy-induced neutropenia (CMS-HCC)       Social Determinants Limiting Care:  None identified    Disposition:  Discharged in stable condition with return precautions    Mala Sultana DO      Procedures ? [employer] last updated 4/25/2024 11:37 PM        Mala Sultana,   04/25/24 2339       Mala Sultana DO  04/26/24 0008

## 2024-04-30 LAB
ATRIAL RATE: 90 BPM
BACTERIA BLD CULT: NORMAL
BACTERIA BLD CULT: NORMAL
P AXIS: 56 DEGREES
P OFFSET: 215 MS
P ONSET: 166 MS
PR INTERVAL: 120 MS
Q ONSET: 226 MS
QRS COUNT: 15 BEATS
QRS DURATION: 88 MS
QT INTERVAL: 368 MS
QTC CALCULATION(BAZETT): 450 MS
QTC FREDERICIA: 421 MS
R AXIS: -31 DEGREES
T AXIS: 93 DEGREES
T OFFSET: 410 MS
VENTRICULAR RATE: 90 BPM

## 2024-05-02 ENCOUNTER — HOSPITAL ENCOUNTER (OUTPATIENT)
Dept: CARDIOLOGY | Facility: HOSPITAL | Age: 74
Discharge: HOME | End: 2024-05-02
Payer: MEDICARE

## 2024-05-02 DIAGNOSIS — Z51.11 ENCOUNTER FOR ANTINEOPLASTIC CHEMOTHERAPY: ICD-10-CM

## 2024-05-02 DIAGNOSIS — C78.5 SECONDARY MALIGNANT NEOPLASM OF LARGE INTESTINE AND RECTUM (MULTI): ICD-10-CM

## 2024-05-02 DIAGNOSIS — C83.30 DIFFUSE LARGE B-CELL LYMPHOMA, UNSPECIFIED BODY REGION (MULTI): ICD-10-CM

## 2024-05-02 DIAGNOSIS — T36.5X5A ADVERSE EFFECT OF AMINOGLYCOSIDES, INITIAL ENCOUNTER: ICD-10-CM

## 2024-05-02 PROCEDURE — 76376 3D RENDER W/INTRP POSTPROCES: CPT

## 2024-05-03 LAB
AORTIC VALVE MEAN GRADIENT: 3 MMHG
AORTIC VALVE PEAK VELOCITY: 1.2 M/S
AV PEAK GRADIENT: 5.8 MMHG
AVA (PEAK VEL): 2.72 CM2
AVA (VTI): 3.11 CM2
EJECTION FRACTION APICAL 4 CHAMBER: 68.5
LEFT ATRIUM VOLUME AREA LENGTH INDEX BSA: 21.1 ML/M2
LEFT VENTRICULAR OUTFLOW TRACT DIAMETER: 2 CM
LV EJECTION FRACTION BIPLANE: 66 %
MITRAL VALVE E/A RATIO: 0.71
MITRAL VALVE E/E' RATIO: 14.56
RIGHT VENTRICLE FREE WALL PEAK S': 12 CM/S
RIGHT VENTRICLE PEAK SYSTOLIC PRESSURE: 21 MMHG
TRICUSPID ANNULAR PLANE SYSTOLIC EXCURSION: 2.4 CM

## 2024-05-09 ENCOUNTER — NUTRITION (OUTPATIENT)
Dept: HEMATOLOGY/ONCOLOGY | Facility: CLINIC | Age: 74
End: 2024-05-09
Payer: MEDICARE

## 2024-05-09 ENCOUNTER — INFUSION (OUTPATIENT)
Dept: HEMATOLOGY/ONCOLOGY | Facility: CLINIC | Age: 74
End: 2024-05-09
Payer: MEDICARE

## 2024-05-09 VITALS
HEART RATE: 80 BPM | SYSTOLIC BLOOD PRESSURE: 117 MMHG | RESPIRATION RATE: 17 BRPM | WEIGHT: 183.2 LBS | BODY MASS INDEX: 37 KG/M2 | DIASTOLIC BLOOD PRESSURE: 71 MMHG | TEMPERATURE: 97.5 F | OXYGEN SATURATION: 95 %

## 2024-05-09 VITALS — WEIGHT: 183.2 LBS | BODY MASS INDEX: 31.28 KG/M2 | HEIGHT: 64 IN

## 2024-05-09 DIAGNOSIS — C85.10 B-CELL LYMPHOMA, UNSPECIFIED B-CELL LYMPHOMA TYPE, UNSPECIFIED BODY REGION (MULTI): ICD-10-CM

## 2024-05-09 DIAGNOSIS — C83.30 DIFFUSE LARGE B-CELL LYMPHOMA, UNSPECIFIED BODY REGION (MULTI): Primary | ICD-10-CM

## 2024-05-09 LAB
ALBUMIN SERPL BCP-MCNC: 3.5 G/DL (ref 3.4–5)
ALP SERPL-CCNC: 99 U/L (ref 33–136)
ALT SERPL W P-5'-P-CCNC: 10 U/L (ref 7–45)
ANION GAP SERPL CALC-SCNC: 12 MMOL/L (ref 10–20)
AST SERPL W P-5'-P-CCNC: 10 U/L (ref 9–39)
BASOPHILS # BLD AUTO: 0.01 X10*3/UL (ref 0–0.1)
BASOPHILS NFR BLD AUTO: 0.1 %
BILIRUB SERPL-MCNC: 0.5 MG/DL (ref 0–1.2)
BUN SERPL-MCNC: 14 MG/DL (ref 6–23)
CALCIUM SERPL-MCNC: 9.4 MG/DL (ref 8.6–10.3)
CHLORIDE SERPL-SCNC: 103 MMOL/L (ref 98–107)
CO2 SERPL-SCNC: 28 MMOL/L (ref 21–32)
CREAT SERPL-MCNC: 0.81 MG/DL (ref 0.5–1.05)
DACRYOCYTES BLD QL SMEAR: NORMAL
EGFRCR SERPLBLD CKD-EPI 2021: 75 ML/MIN/1.73M*2
EOSINOPHIL # BLD AUTO: 0.03 X10*3/UL (ref 0–0.4)
EOSINOPHIL NFR BLD AUTO: 0.3 %
ERYTHROCYTE [DISTWIDTH] IN BLOOD BY AUTOMATED COUNT: 20.3 % (ref 11.5–14.5)
GLUCOSE SERPL-MCNC: 94 MG/DL (ref 74–99)
HCT VFR BLD AUTO: 31.2 % (ref 36–46)
HGB BLD-MCNC: 9.6 G/DL (ref 12–16)
IMM GRANULOCYTES # BLD AUTO: 0.05 X10*3/UL (ref 0–0.5)
IMM GRANULOCYTES NFR BLD AUTO: 0.5 % (ref 0–0.9)
LDH SERPL L TO P-CCNC: 177 U/L (ref 84–246)
LYMPHOCYTES # BLD AUTO: 0.66 X10*3/UL (ref 0.8–3)
LYMPHOCYTES NFR BLD AUTO: 6.1 %
MCH RBC QN AUTO: 28.4 PG (ref 26–34)
MCHC RBC AUTO-ENTMCNC: 30.8 G/DL (ref 32–36)
MCV RBC AUTO: 92 FL (ref 80–100)
MONOCYTES # BLD AUTO: 0.32 X10*3/UL (ref 0.05–0.8)
MONOCYTES NFR BLD AUTO: 3 %
NEUTROPHILS # BLD AUTO: 9.73 X10*3/UL (ref 1.6–5.5)
NEUTROPHILS NFR BLD AUTO: 90 %
NRBC BLD-RTO: ABNORMAL /100{WBCS}
PLATELET # BLD AUTO: 214 X10*3/UL (ref 150–450)
POLYCHROMASIA BLD QL SMEAR: NORMAL
POTASSIUM SERPL-SCNC: 3.8 MMOL/L (ref 3.5–5.3)
PROT SERPL-MCNC: 6 G/DL (ref 6.4–8.2)
RBC # BLD AUTO: 3.38 X10*6/UL (ref 4–5.2)
RBC MORPH BLD: NORMAL
SODIUM SERPL-SCNC: 139 MMOL/L (ref 136–145)
WBC # BLD AUTO: 10.8 X10*3/UL (ref 4.4–11.3)

## 2024-05-09 PROCEDURE — 96372 THER/PROPH/DIAG INJ SC/IM: CPT

## 2024-05-09 PROCEDURE — 85025 COMPLETE CBC W/AUTO DIFF WBC: CPT | Performed by: INTERNAL MEDICINE

## 2024-05-09 PROCEDURE — 96413 CHEMO IV INFUSION 1 HR: CPT

## 2024-05-09 PROCEDURE — 2500000004 HC RX 250 GENERAL PHARMACY W/ HCPCS (ALT 636 FOR OP/ED): Performed by: INTERNAL MEDICINE

## 2024-05-09 PROCEDURE — 83615 LACTATE (LD) (LDH) ENZYME: CPT | Performed by: INTERNAL MEDICINE

## 2024-05-09 PROCEDURE — 96417 CHEMO IV INFUS EACH ADDL SEQ: CPT

## 2024-05-09 PROCEDURE — 96375 TX/PRO/DX INJ NEW DRUG ADDON: CPT | Mod: INF

## 2024-05-09 PROCEDURE — 80053 COMPREHEN METABOLIC PANEL: CPT | Performed by: INTERNAL MEDICINE

## 2024-05-09 PROCEDURE — 96415 CHEMO IV INFUSION ADDL HR: CPT

## 2024-05-09 PROCEDURE — 96377 APPLICATON ON-BODY INJECTOR: CPT

## 2024-05-09 PROCEDURE — 96411 CHEMO IV PUSH ADDL DRUG: CPT

## 2024-05-09 PROCEDURE — 2500000001 HC RX 250 WO HCPCS SELF ADMINISTERED DRUGS (ALT 637 FOR MEDICARE OP): Performed by: INTERNAL MEDICINE

## 2024-05-09 PROCEDURE — 2500000004 HC RX 250 GENERAL PHARMACY W/ HCPCS (ALT 636 FOR OP/ED): Mod: JG | Performed by: INTERNAL MEDICINE

## 2024-05-09 PROCEDURE — 96367 TX/PROPH/DG ADDL SEQ IV INF: CPT

## 2024-05-09 RX ORDER — PALONOSETRON 0.05 MG/ML
0.25 INJECTION, SOLUTION INTRAVENOUS ONCE
OUTPATIENT
Start: 2024-06-20

## 2024-05-09 RX ORDER — EPINEPHRINE 0.3 MG/.3ML
0.3 INJECTION SUBCUTANEOUS EVERY 5 MIN PRN
Status: DISCONTINUED | OUTPATIENT
Start: 2024-05-09 | End: 2024-05-09 | Stop reason: HOSPADM

## 2024-05-09 RX ORDER — ACETAMINOPHEN 325 MG/1
650 TABLET ORAL ONCE
Status: COMPLETED | OUTPATIENT
Start: 2024-05-09 | End: 2024-05-09

## 2024-05-09 RX ORDER — PROCHLORPERAZINE EDISYLATE 5 MG/ML
10 INJECTION INTRAMUSCULAR; INTRAVENOUS EVERY 6 HOURS PRN
Status: DISCONTINUED | OUTPATIENT
Start: 2024-05-09 | End: 2024-05-09 | Stop reason: HOSPADM

## 2024-05-09 RX ORDER — DIPHENHYDRAMINE HYDROCHLORIDE 50 MG/ML
50 INJECTION INTRAMUSCULAR; INTRAVENOUS AS NEEDED
OUTPATIENT
Start: 2024-06-20

## 2024-05-09 RX ORDER — DIPHENHYDRAMINE HYDROCHLORIDE 50 MG/ML
50 INJECTION INTRAMUSCULAR; INTRAVENOUS AS NEEDED
Status: DISCONTINUED | OUTPATIENT
Start: 2024-05-09 | End: 2024-05-09 | Stop reason: HOSPADM

## 2024-05-09 RX ORDER — ALBUTEROL SULFATE 0.83 MG/ML
3 SOLUTION RESPIRATORY (INHALATION) AS NEEDED
Status: DISCONTINUED | OUTPATIENT
Start: 2024-05-09 | End: 2024-05-09 | Stop reason: HOSPADM

## 2024-05-09 RX ORDER — PALONOSETRON 0.05 MG/ML
0.25 INJECTION, SOLUTION INTRAVENOUS ONCE
Status: COMPLETED | OUTPATIENT
Start: 2024-05-09 | End: 2024-05-09

## 2024-05-09 RX ORDER — ALBUTEROL SULFATE 0.83 MG/ML
3 SOLUTION RESPIRATORY (INHALATION) AS NEEDED
Status: CANCELLED | OUTPATIENT
Start: 2024-05-30

## 2024-05-09 RX ORDER — ALBUTEROL SULFATE 0.83 MG/ML
3 SOLUTION RESPIRATORY (INHALATION) AS NEEDED
OUTPATIENT
Start: 2024-06-20

## 2024-05-09 RX ORDER — PROCHLORPERAZINE MALEATE 10 MG
10 TABLET ORAL EVERY 6 HOURS PRN
OUTPATIENT
Start: 2024-06-20

## 2024-05-09 RX ORDER — EPINEPHRINE 0.3 MG/.3ML
0.3 INJECTION SUBCUTANEOUS EVERY 5 MIN PRN
Status: CANCELLED | OUTPATIENT
Start: 2024-05-30

## 2024-05-09 RX ORDER — DIPHENHYDRAMINE HCL 25 MG
50 CAPSULE ORAL ONCE
Status: CANCELLED | OUTPATIENT
Start: 2024-05-30

## 2024-05-09 RX ORDER — DIPHENHYDRAMINE HYDROCHLORIDE 50 MG/ML
50 INJECTION INTRAMUSCULAR; INTRAVENOUS AS NEEDED
Status: CANCELLED | OUTPATIENT
Start: 2024-05-30

## 2024-05-09 RX ORDER — PALONOSETRON 0.05 MG/ML
0.25 INJECTION, SOLUTION INTRAVENOUS ONCE
Status: CANCELLED | OUTPATIENT
Start: 2024-05-30

## 2024-05-09 RX ORDER — ACETAMINOPHEN 325 MG/1
650 TABLET ORAL ONCE
Status: CANCELLED | OUTPATIENT
Start: 2024-05-30

## 2024-05-09 RX ORDER — PROCHLORPERAZINE MALEATE 10 MG
10 TABLET ORAL EVERY 6 HOURS PRN
Status: CANCELLED | OUTPATIENT
Start: 2024-05-30

## 2024-05-09 RX ORDER — HEPARIN SODIUM,PORCINE/PF 10 UNIT/ML
50 SYRINGE (ML) INTRAVENOUS AS NEEDED
Status: DISCONTINUED | OUTPATIENT
Start: 2024-05-09 | End: 2024-05-09 | Stop reason: HOSPADM

## 2024-05-09 RX ORDER — DOXORUBICIN HYDROCHLORIDE 2 MG/ML
50 INJECTION, SOLUTION INTRAVENOUS ONCE
OUTPATIENT
Start: 2024-06-20

## 2024-05-09 RX ORDER — DIPHENHYDRAMINE HCL 25 MG
50 CAPSULE ORAL ONCE
Status: COMPLETED | OUTPATIENT
Start: 2024-05-09 | End: 2024-05-09

## 2024-05-09 RX ORDER — HEPARIN SODIUM,PORCINE/PF 10 UNIT/ML
50 SYRINGE (ML) INTRAVENOUS AS NEEDED
Status: CANCELLED | OUTPATIENT
Start: 2024-05-09

## 2024-05-09 RX ORDER — ACETAMINOPHEN 325 MG/1
650 TABLET ORAL ONCE
OUTPATIENT
Start: 2024-06-20

## 2024-05-09 RX ORDER — FAMOTIDINE 10 MG/ML
20 INJECTION INTRAVENOUS ONCE AS NEEDED
Status: CANCELLED | OUTPATIENT
Start: 2024-05-30

## 2024-05-09 RX ORDER — DIPHENHYDRAMINE HCL 25 MG
50 CAPSULE ORAL ONCE
OUTPATIENT
Start: 2024-06-20

## 2024-05-09 RX ORDER — FAMOTIDINE 10 MG/ML
20 INJECTION INTRAVENOUS ONCE AS NEEDED
OUTPATIENT
Start: 2024-06-20

## 2024-05-09 RX ORDER — PROCHLORPERAZINE MALEATE 10 MG
10 TABLET ORAL EVERY 6 HOURS PRN
Status: DISCONTINUED | OUTPATIENT
Start: 2024-05-09 | End: 2024-05-09 | Stop reason: HOSPADM

## 2024-05-09 RX ORDER — HEPARIN 100 UNIT/ML
500 SYRINGE INTRAVENOUS AS NEEDED
Status: CANCELLED | OUTPATIENT
Start: 2024-05-09

## 2024-05-09 RX ORDER — DOXORUBICIN HYDROCHLORIDE 2 MG/ML
50 INJECTION, SOLUTION INTRAVENOUS ONCE
Status: CANCELLED | OUTPATIENT
Start: 2024-05-30

## 2024-05-09 RX ORDER — HEPARIN 100 UNIT/ML
500 SYRINGE INTRAVENOUS AS NEEDED
Status: DISCONTINUED | OUTPATIENT
Start: 2024-05-09 | End: 2024-05-09 | Stop reason: HOSPADM

## 2024-05-09 RX ORDER — PROCHLORPERAZINE EDISYLATE 5 MG/ML
10 INJECTION INTRAMUSCULAR; INTRAVENOUS EVERY 6 HOURS PRN
OUTPATIENT
Start: 2024-06-20

## 2024-05-09 RX ORDER — PROCHLORPERAZINE EDISYLATE 5 MG/ML
10 INJECTION INTRAMUSCULAR; INTRAVENOUS EVERY 6 HOURS PRN
Status: CANCELLED | OUTPATIENT
Start: 2024-05-30

## 2024-05-09 RX ORDER — DOXORUBICIN HYDROCHLORIDE 2 MG/ML
50 INJECTION, SOLUTION INTRAVENOUS ONCE
Status: COMPLETED | OUTPATIENT
Start: 2024-05-09 | End: 2024-05-09

## 2024-05-09 RX ORDER — FAMOTIDINE 10 MG/ML
20 INJECTION INTRAVENOUS ONCE AS NEEDED
Status: DISCONTINUED | OUTPATIENT
Start: 2024-05-09 | End: 2024-05-09 | Stop reason: HOSPADM

## 2024-05-09 RX ORDER — EPINEPHRINE 0.3 MG/.3ML
0.3 INJECTION SUBCUTANEOUS EVERY 5 MIN PRN
OUTPATIENT
Start: 2024-06-20

## 2024-05-09 RX ADMIN — ACETAMINOPHEN 650 MG: 325 TABLET ORAL at 12:05

## 2024-05-09 RX ADMIN — DOXORUBICIN HYDROCHLORIDE 100 MG: 2 INJECTION, SOLUTION INTRAVENOUS at 11:19

## 2024-05-09 RX ADMIN — PALONOSETRON HYDROCHLORIDE 250 MCG: 0.25 INJECTION INTRAVENOUS at 10:30

## 2024-05-09 RX ADMIN — PEGFILGRASTIM 6 MG: KIT SUBCUTANEOUS at 15:29

## 2024-05-09 RX ADMIN — SODIUM CHLORIDE 753.75 MG: 9 INJECTION, SOLUTION INTRAVENOUS at 12:45

## 2024-05-09 RX ADMIN — FOSAPREPITANT DIMEGLUMINE 150 MG: 150 INJECTION, POWDER, LYOPHILIZED, FOR SOLUTION INTRAVENOUS at 10:30

## 2024-05-09 RX ADMIN — VINCRISTINE SULFATE 2 MG: 1 INJECTION, SOLUTION INTRAVENOUS at 11:49

## 2024-05-09 RX ADMIN — HEPARIN 500 UNITS: 100 SYRINGE at 15:38

## 2024-05-09 RX ADMIN — DIPHENHYDRAMINE HYDROCHLORIDE 50 MG: 25 CAPSULE ORAL at 12:05

## 2024-05-09 RX ADMIN — CYCLOPHOSPHAMIDE 1500 MG: 1 INJECTION, POWDER, FOR SOLUTION INTRAVENOUS; ORAL at 12:06

## 2024-05-09 ASSESSMENT — PAIN SCALES - GENERAL: PAINLEVEL: 0-NO PAIN

## 2024-05-09 NOTE — PATIENT INSTRUCTIONS
Continue current diet of adequate calories/ protein. Adjust as discussed previously for nutrition impact symptoms.     Will intervene as needed.

## 2024-05-09 NOTE — PROGRESS NOTES
Rituxan rates:    100 mg/hr x 30 - 43ml/hr  200 mg/hr x 30 - 86 ml/hr  300 mg/hr x 30 - 130ml/hr  400 mg/ hr MAX - 173ml/r    Rates checked with Mayela Miles RN    Pt tolerated infusion today without any s/sx of an adverse rxn. Pt discharged home in stable condition.

## 2024-05-09 NOTE — PROGRESS NOTES
"NUTRITION Follow-up NOTE    Nutrition Assessment     Reason for Visit:  Kellie Rae is a 74 y.o. female who presents for diffuse large B cell lymphoma.   Hx: HTN, HLD  Current tx: RCHOP  Started: 3/7/24    Nutrition consult for weight loss.  Visited with pt today during treatment for follow-up     Lab Results   Component Value Date/Time    GLUCOSE 94 05/09/2024 0910     05/09/2024 0910    K 3.8 05/09/2024 0910     05/09/2024 0910    CO2 28 05/09/2024 0910    ANIONGAP 12 05/09/2024 0910    BUN 14 05/09/2024 0910    CREATININE 0.81 05/09/2024 0910    EGFR 75 05/09/2024 0910    CALCIUM 9.4 05/09/2024 0910    ALBUMIN 3.5 05/09/2024 0910    ALKPHOS 99 05/09/2024 0910    PROT 6.0 (L) 05/09/2024 0910    AST 10 05/09/2024 0910    BILITOT 0.5 05/09/2024 0910    ALT 10 05/09/2024 0910     No results found for: \"VITD25\"    Anthropometrics:  Anthropometrics  Height: 162.6 cm (5' 4.02\")  Weight: 83.1 kg (183 lb 3.2 oz)  BMI (Calculated): 31.43  IBW/kg (Dietitian Calculated): 54.5 kg  Adjusted Body Weight (kg): 63 kg  Weight Change  Weight History / % Weight Change: Weight stable over the last few weeks, minor fluctuations; previously noted significant weight loss        Wt Readings from Last 10 Encounters:   05/09/24 83.1 kg (183 lb 3.2 oz)   05/09/24 83.1 kg (183 lb 3.2 oz)   04/25/24 83 kg (182 lb 15.7 oz)   04/25/24 83 kg (183 lb 1.5 oz)   04/18/24 83.7 kg (184 lb 8.4 oz)   04/18/24 83.7 kg (184 lb 8.4 oz)   03/28/24 83.6 kg (184 lb 4.9 oz)   03/28/24 83.6 kg (184 lb 4.9 oz)   03/14/24 86.2 kg (190 lb)   03/07/24 87.5 kg (192 lb 12.7 oz)        Food And Nutrient Intake:  Food and Nutrient History  Food and Nutrient History: Feels that dysgeusia is intermittent. Has been able to find foods can tolerate with some improvement. Dry mouth mostly at night- resolves during the day.  Energy Intake: Fair 50-75 %, Good > 75 % (adequate to maintain weight)  Fluid Intake: gatorade on and off; 2-3 24oz bottles  GI " "Symptoms: none  Oral Problems: dysgeusia, xerostomia (Dry mouth at night)     Food Intake  Amount of Food: Lobster tail, shrimp - salt; Fairlife - using milk 2%; fruit is tasting good  Meal 1: B = 1/2 bagel with butter  Meal 2: Late lunch  Meal 3: D = [place] dip sandwich                                                        Nutrition Focused Physical Exam Findings:  Completed 3/7/24                        Energy Needs  Calculated Energy Needs Using Equations  Height: 162.6 cm (5' 4.02\")  Weight Used for Equation Calculations: 63 kg (138 lb 14.2 oz) (adjusted weight)  Marlin- . Dangelo Equation (Overweight or Obese Patients): 1105  Estimated Energy Needs  Total Energy Estimated Needs (kCal): 1890 kCal  Total Estimated Energy Need per Day (kCal/kg): 30 kCal/kg  Estimated Fluid Needs  Total Fluid Estimated Needs (mL): 1890 mL  Total Fluid Estimated Needs (mL/kg): 30 mL/kg  Estimated Protein Needs  Total Protein Estimated Needs (g): 75.6 g  Total Protein Estimated Needs (g/kg): 1.2 g/kg        Nutrition Diagnosis   Malnutrition Diagnosis  Patient has Malnutrition Diagnosis: Yes  Diagnosis Status: Ongoing  Malnutrition Diagnosis: Severe malnutrition related to chronic disease or condition  As Evidenced by: 6.2% weight loss in 1 month, oral intake < 50-75% of needs for > 1 week, pt reporting nutrition impact symptoms of mucositis, dysgeusia, xerostomia, constipation, nausea, anorexia, while undergoing treatment for DLBCL.  Additional Assessment Information: Improving- weight is stabilizing and nutrition impact sympomts are improving    Nutrition Diagnosis  Patient has Nutrition Diagnosis: Yes  Diagnosis Status (1): Ongoing  Nutrition Diagnosis 1: Predicted inadequate energy intake  Related to (1): potential for nutrition impact symptoms with treatment  As Evidenced by (1): pt undergoing chemotherapy for DLBCL.  Additional Assessment Information (1): improving NIS; weight stablizing    Nutrition " Interventions/Recommendations   Nutrition Prescription  Individualized Nutrition Prescription Provided for : Nutrition during cancer treatment, management of nutrition impact symptoms.    Food and Nutrition Delivery  Food and Nutrition Delivery  Meals & Snacks: Energy-modified diet, Protein-modified diet, Fluid-modified diet  Goals: Continue with adequate calories/ protein to maintain weight. Continue with multiple meals/ snacks daily- no skipping meals. Use of chocolate milk for added calories.  Medical Food Supplement: Commercial beverage  Goals: Continue use of fairlife milk- does not like ONS; encouraged use    Nutrition Education  Nutrition Education  Nutrition Education Content: Content related nutrition education  Goals: High calorie high protein diet    Coordination of Care       Patient Instructions   Continue current diet of adequate calories/ protein. Adjust as discussed previously for nutrition impact symptoms.     Will intervene as needed.     Nutrition Monitoring and Evaluation   Food/Nutrient Related History Monitoring  Monitoring and Evaluation Plan: Energy intake, Fluid intake, Meal/snack pattern, Protein intake, Amount of food  Energy Intake: Estimated energy intake  Criteria: Meet at least 75% of caloric needs  Fluid Intake: Estimated fluid intake  Criteria: Maintain hydration; 63+oz daily  Amount of Food: Medical food intake  Criteria: lower lactose options as able  Meal/Snack Pattern: Estimated meal and snack pattern  Criteria: 4-6 meals/snacks per day at least 50% of the time  Estimated protein intake: Estimated protein intake  Criteria: Meet at least 75% of protein needs orally  Body Composition/Growth/Weight History  Monitoring and Evaluation Plan: Weight  Weight: Measured weight  Criteria: Maintain weight          Time Spent  Prep time on day of patient encounter: 0 minutes  Time spent directly with patient, family or caregiver: 10 minutes  Additional Time Spent on Patient Care Activities: 0  minutes  Documentation Time: 10 minutes  Other Time Spent: 0 minutes  Total: 20 minutes

## 2024-05-30 ENCOUNTER — INFUSION (OUTPATIENT)
Dept: HEMATOLOGY/ONCOLOGY | Facility: CLINIC | Age: 74
End: 2024-05-30
Payer: MEDICARE

## 2024-05-30 ENCOUNTER — OFFICE VISIT (OUTPATIENT)
Dept: HEMATOLOGY/ONCOLOGY | Facility: CLINIC | Age: 74
End: 2024-05-30
Payer: MEDICARE

## 2024-05-30 VITALS
DIASTOLIC BLOOD PRESSURE: 88 MMHG | HEART RATE: 72 BPM | OXYGEN SATURATION: 96 % | WEIGHT: 179.45 LBS | RESPIRATION RATE: 16 BRPM | BODY MASS INDEX: 30.79 KG/M2 | SYSTOLIC BLOOD PRESSURE: 145 MMHG | TEMPERATURE: 97.2 F

## 2024-05-30 DIAGNOSIS — C83.30 DIFFUSE LARGE B-CELL LYMPHOMA, UNSPECIFIED BODY REGION (MULTI): ICD-10-CM

## 2024-05-30 DIAGNOSIS — C85.10 B-CELL LYMPHOMA, UNSPECIFIED B-CELL LYMPHOMA TYPE, UNSPECIFIED BODY REGION (MULTI): ICD-10-CM

## 2024-05-30 LAB
ALBUMIN SERPL BCP-MCNC: 3.6 G/DL (ref 3.4–5)
ALP SERPL-CCNC: 97 U/L (ref 33–136)
ALT SERPL W P-5'-P-CCNC: 8 U/L (ref 7–45)
ANION GAP SERPL CALC-SCNC: 13 MMOL/L (ref 10–20)
AST SERPL W P-5'-P-CCNC: 9 U/L (ref 9–39)
BASOPHILS # BLD AUTO: 0.02 X10*3/UL (ref 0–0.1)
BASOPHILS NFR BLD AUTO: 0.2 %
BILIRUB SERPL-MCNC: 0.4 MG/DL (ref 0–1.2)
BUN SERPL-MCNC: 13 MG/DL (ref 6–23)
CALCIUM SERPL-MCNC: 9.4 MG/DL (ref 8.6–10.3)
CHLORIDE SERPL-SCNC: 104 MMOL/L (ref 98–107)
CO2 SERPL-SCNC: 26 MMOL/L (ref 21–32)
CREAT SERPL-MCNC: 0.77 MG/DL (ref 0.5–1.05)
EGFRCR SERPLBLD CKD-EPI 2021: 80 ML/MIN/1.73M*2
EOSINOPHIL # BLD AUTO: 0.01 X10*3/UL (ref 0–0.4)
EOSINOPHIL NFR BLD AUTO: 0.1 %
ERYTHROCYTE [DISTWIDTH] IN BLOOD BY AUTOMATED COUNT: 19.8 % (ref 11.5–14.5)
GLUCOSE SERPL-MCNC: 106 MG/DL (ref 74–99)
HCT VFR BLD AUTO: 31.1 % (ref 36–46)
HGB BLD-MCNC: 9.5 G/DL (ref 12–16)
IMM GRANULOCYTES # BLD AUTO: 0.08 X10*3/UL (ref 0–0.5)
IMM GRANULOCYTES NFR BLD AUTO: 0.6 % (ref 0–0.9)
LDH SERPL L TO P-CCNC: 148 U/L (ref 84–246)
LYMPHOCYTES # BLD AUTO: 0.57 X10*3/UL (ref 0.8–3)
LYMPHOCYTES NFR BLD AUTO: 4.5 %
MCH RBC QN AUTO: 29 PG (ref 26–34)
MCHC RBC AUTO-ENTMCNC: 30.5 G/DL (ref 32–36)
MCV RBC AUTO: 95 FL (ref 80–100)
MONOCYTES # BLD AUTO: 0.44 X10*3/UL (ref 0.05–0.8)
MONOCYTES NFR BLD AUTO: 3.5 %
NEUTROPHILS # BLD AUTO: 11.44 X10*3/UL (ref 1.6–5.5)
NEUTROPHILS NFR BLD AUTO: 91.1 %
PLATELET # BLD AUTO: 222 X10*3/UL (ref 150–450)
POTASSIUM SERPL-SCNC: 4.1 MMOL/L (ref 3.5–5.3)
PROT SERPL-MCNC: 6.4 G/DL (ref 6.4–8.2)
RBC # BLD AUTO: 3.28 X10*6/UL (ref 4–5.2)
SODIUM SERPL-SCNC: 139 MMOL/L (ref 136–145)
WBC # BLD AUTO: 12.6 X10*3/UL (ref 4.4–11.3)

## 2024-05-30 PROCEDURE — 96367 TX/PROPH/DG ADDL SEQ IV INF: CPT

## 2024-05-30 PROCEDURE — 84075 ASSAY ALKALINE PHOSPHATASE: CPT | Performed by: INTERNAL MEDICINE

## 2024-05-30 PROCEDURE — 2500000004 HC RX 250 GENERAL PHARMACY W/ HCPCS (ALT 636 FOR OP/ED): Performed by: INTERNAL MEDICINE

## 2024-05-30 PROCEDURE — 96415 CHEMO IV INFUSION ADDL HR: CPT

## 2024-05-30 PROCEDURE — 99215 OFFICE O/P EST HI 40 MIN: CPT | Performed by: INTERNAL MEDICINE

## 2024-05-30 PROCEDURE — 96377 APPLICATON ON-BODY INJECTOR: CPT

## 2024-05-30 PROCEDURE — 1159F MED LIST DOCD IN RCRD: CPT | Performed by: INTERNAL MEDICINE

## 2024-05-30 PROCEDURE — 3079F DIAST BP 80-89 MM HG: CPT | Performed by: INTERNAL MEDICINE

## 2024-05-30 PROCEDURE — 96413 CHEMO IV INFUSION 1 HR: CPT

## 2024-05-30 PROCEDURE — 96417 CHEMO IV INFUS EACH ADDL SEQ: CPT

## 2024-05-30 PROCEDURE — 83615 LACTATE (LD) (LDH) ENZYME: CPT | Performed by: INTERNAL MEDICINE

## 2024-05-30 PROCEDURE — 96411 CHEMO IV PUSH ADDL DRUG: CPT

## 2024-05-30 PROCEDURE — 85025 COMPLETE CBC W/AUTO DIFF WBC: CPT | Performed by: INTERNAL MEDICINE

## 2024-05-30 PROCEDURE — 2500000001 HC RX 250 WO HCPCS SELF ADMINISTERED DRUGS (ALT 637 FOR MEDICARE OP): Performed by: INTERNAL MEDICINE

## 2024-05-30 PROCEDURE — 3077F SYST BP >= 140 MM HG: CPT | Performed by: INTERNAL MEDICINE

## 2024-05-30 PROCEDURE — 96375 TX/PRO/DX INJ NEW DRUG ADDON: CPT | Mod: INF

## 2024-05-30 PROCEDURE — 1126F AMNT PAIN NOTED NONE PRSNT: CPT | Performed by: INTERNAL MEDICINE

## 2024-05-30 RX ORDER — HEPARIN 100 UNIT/ML
500 SYRINGE INTRAVENOUS AS NEEDED
Status: DISCONTINUED | OUTPATIENT
Start: 2024-05-30 | End: 2024-05-30 | Stop reason: HOSPADM

## 2024-05-30 RX ORDER — ACETAMINOPHEN 325 MG/1
650 TABLET ORAL ONCE
Status: COMPLETED | OUTPATIENT
Start: 2024-05-30 | End: 2024-05-30

## 2024-05-30 RX ORDER — DIPHENHYDRAMINE HYDROCHLORIDE 50 MG/ML
50 INJECTION INTRAMUSCULAR; INTRAVENOUS AS NEEDED
Status: DISCONTINUED | OUTPATIENT
Start: 2024-05-30 | End: 2024-05-30 | Stop reason: HOSPADM

## 2024-05-30 RX ORDER — PROCHLORPERAZINE EDISYLATE 5 MG/ML
10 INJECTION INTRAMUSCULAR; INTRAVENOUS EVERY 6 HOURS PRN
Status: DISCONTINUED | OUTPATIENT
Start: 2024-05-30 | End: 2024-05-30 | Stop reason: HOSPADM

## 2024-05-30 RX ORDER — HEPARIN SODIUM,PORCINE/PF 10 UNIT/ML
50 SYRINGE (ML) INTRAVENOUS AS NEEDED
OUTPATIENT
Start: 2024-05-30

## 2024-05-30 RX ORDER — DIPHENHYDRAMINE HCL 25 MG
50 CAPSULE ORAL ONCE
Status: COMPLETED | OUTPATIENT
Start: 2024-05-30 | End: 2024-05-30

## 2024-05-30 RX ORDER — DOXORUBICIN HYDROCHLORIDE 2 MG/ML
50 INJECTION, SOLUTION INTRAVENOUS ONCE
Status: COMPLETED | OUTPATIENT
Start: 2024-05-30 | End: 2024-05-30

## 2024-05-30 RX ORDER — PALONOSETRON 0.05 MG/ML
0.25 INJECTION, SOLUTION INTRAVENOUS ONCE
Status: COMPLETED | OUTPATIENT
Start: 2024-05-30 | End: 2024-05-30

## 2024-05-30 RX ORDER — HEPARIN 100 UNIT/ML
500 SYRINGE INTRAVENOUS AS NEEDED
OUTPATIENT
Start: 2024-05-30

## 2024-05-30 RX ORDER — PROCHLORPERAZINE MALEATE 10 MG
10 TABLET ORAL EVERY 6 HOURS PRN
Status: DISCONTINUED | OUTPATIENT
Start: 2024-05-30 | End: 2024-05-30 | Stop reason: HOSPADM

## 2024-05-30 RX ORDER — FAMOTIDINE 10 MG/ML
20 INJECTION INTRAVENOUS ONCE AS NEEDED
Status: DISCONTINUED | OUTPATIENT
Start: 2024-05-30 | End: 2024-05-30 | Stop reason: HOSPADM

## 2024-05-30 RX ORDER — ALBUTEROL SULFATE 0.83 MG/ML
3 SOLUTION RESPIRATORY (INHALATION) AS NEEDED
Status: DISCONTINUED | OUTPATIENT
Start: 2024-05-30 | End: 2024-05-30 | Stop reason: HOSPADM

## 2024-05-30 RX ORDER — EPINEPHRINE 0.3 MG/.3ML
0.3 INJECTION SUBCUTANEOUS EVERY 5 MIN PRN
Status: DISCONTINUED | OUTPATIENT
Start: 2024-05-30 | End: 2024-05-30 | Stop reason: HOSPADM

## 2024-05-30 RX ADMIN — PALONOSETRON 250 MCG: 0.05 INJECTION, SOLUTION INTRAVENOUS at 09:38

## 2024-05-30 RX ADMIN — VINCRISTINE SULFATE 2 MG: 1 INJECTION, SOLUTION INTRAVENOUS at 11:21

## 2024-05-30 RX ADMIN — DIPHENHYDRAMINE HYDROCHLORIDE 50 MG: 25 CAPSULE ORAL at 11:20

## 2024-05-30 RX ADMIN — DOXORUBICIN HYDROCHLORIDE 100 MG: 2 INJECTION, SOLUTION INTRAVENOUS at 10:46

## 2024-05-30 RX ADMIN — SODIUM CHLORIDE 753.75 MG: 9 INJECTION, SOLUTION INTRAVENOUS at 12:31

## 2024-05-30 RX ADMIN — FOSAPREPITANT 150 MG: 150 INJECTION, POWDER, LYOPHILIZED, FOR SOLUTION INTRAVENOUS at 09:49

## 2024-05-30 RX ADMIN — ACETAMINOPHEN 650 MG: 325 TABLET ORAL at 11:20

## 2024-05-30 RX ADMIN — PEGFILGRASTIM 6 MG: KIT SUBCUTANEOUS at 13:40

## 2024-05-30 RX ADMIN — HEPARIN 500 UNITS: 100 SYRINGE at 15:39

## 2024-05-30 RX ADMIN — CYCLOPHOSPHAMIDE 1500 MG: 1 INJECTION, POWDER, FOR SOLUTION INTRAVENOUS; ORAL at 11:45

## 2024-05-30 ASSESSMENT — PAIN SCALES - GENERAL: PAINLEVEL: 0-NO PAIN

## 2024-05-30 NOTE — PATIENT INSTRUCTIONS
Today you met with Dr. Rowell.  You are looking good.  Eating and drinking better! Yeah.  Today is cycle 5.  You will have cycle 6, then a PET.  No changes to your treatment plan.  Please call the office for any questions or concerns.  Review your schedule prior to leaving [facility].  We ask that you notify us 5-7 days before your medications need refilled.  [facility] is strongly encouraging all patients to access their MyChart for all results and to communicate with their provider for non-urgent messages.  If an urgent/same day/sick concern response is needed, please call the office at [phone]. Thank You!

## 2024-05-30 NOTE — PROGRESS NOTES
Patient ID: Kellie Rae is a 74 y.o. female.  Referring Physician: Contreras Rowell MD  [id number] [id number]  Primary Care Provider: Douglas Yañez MD  Visit Type:  Follow Up     Subjective    HPI  Referred by primary care doctor because of the results of CT chest abdomen and pelvis showing a soft tissue mass lesion within the mesentery with multiple retroperitoneal and mesenteric lymph and adenopathy.  Radiographically the findings could represent lymphoma.  I was able to elicit from had that his symptoms began about 5 years ago with pain in the abdomen and the back she subsequently was diagnosed with gout and it is not clear the etiology of the of the gout currently on allopurinol and colchicine.  She is also on nonsteroidals anti-inflammatory agents as well.  She was referred to medical oncology because a CT scan of the abdomen showed intra-abdominal lesions reminiscent of lymphadenopathy/Lymphoma.  Biopsy has been ordered to confirm the diagnosis.    FINAL DIAGNOSIS      A. SOFT TISSUE MASS, MESENTERY, BIOPSY:    DIFFUSE LARGE B CELL LYMPHOMA, FINAL CLASSIFICATION PENDING GENETIC STUDIES,    SUBTYPE BY PALMA CRITERIA:  NON GERMINAL CENTER CELL TYPE   MYC/BCL-2 EXPRESSION: NOT A DOUBLE EXPRESSOR (MYC-,BCL-2+)     Review of Systems   Constitutional: Negative.    HENT:  Negative.     Eyes: Negative.    Respiratory: Negative.     Cardiovascular: Negative.         Objective   BSA: 1.92 meters squared  /88 (BP Location: Left arm, Patient Position: Sitting, BP Cuff Size: Adult)   Pulse 72   Temp 36.2 °C (97.2 °F) (Temporal)   Resp 16   Wt 81.4 kg (179 lb 7.3 oz)   SpO2 96%   BMI 30.79 kg/m²      has a past medical history of GERD (gastroesophageal reflux disease), Gout, Hyperlipidemia, Hypertension, Hypothyroidism, and Sleep apnea.   has a past surgical history that includes Appendectomy; Colonoscopy; and Cataract extraction (Bilateral).  No family history on  file.  Oncology History   Lymphoma (Multi)   2/23/2024 Initial Diagnosis    Lymphoma (CMS/HCC)     3/7/2024 -  Chemotherapy    R-CHOP (Cyclophosphamide / DOXOrubicin / VinCRIStine / PredniSONE) + RiTUXimab, 21 Day Cycles         Kellie Rae  reports that she quit smoking about 25 years ago. Her smoking use included cigarettes. She started smoking about 56 years ago. She has a 31 pack-year smoking history. She has never used smokeless tobacco.  She  reports no history of alcohol use.  She  reports no history of drug use.    Physical Exam  Constitutional:       Appearance: Normal appearance.   HENT:      Head: Normocephalic and atraumatic.   Eyes:      Extraocular Movements: Extraocular movements intact.      Pupils: Pupils are equal, round, and reactive to light.   Neurological:      Mental Status: She is alert.         WBC   Date/Time Value Ref Range Status   05/30/2024 08:23 AM 12.6 (H) 4.4 - 11.3 x10*3/uL Final   05/09/2024 09:10 AM 10.8 4.4 - 11.3 x10*3/uL Final   04/25/2024 03:22 PM 0.5 (LL) 4.4 - 11.3 x10*3/uL Final     nRBC   Date Value Ref Range Status   05/09/2024   Final     Comment:     Not Measured   04/25/2024   Final     Comment:     Not Measured   03/14/2024 0.0 0.0 - 0.0 /100 WBCs Final     RBC   Date Value Ref Range Status   05/30/2024 3.28 (L) 4.00 - 5.20 x10*6/uL Final   05/09/2024 3.38 (L) 4.00 - 5.20 x10*6/uL Final   04/25/2024 3.62 (L) 4.00 - 5.20 x10*6/uL Final     Hemoglobin   Date Value Ref Range Status   05/30/2024 9.5 (L) 12.0 - 16.0 g/dL Final   05/09/2024 9.6 (L) 12.0 - 16.0 g/dL Final   04/25/2024 9.8 (L) 12.0 - 16.0 g/dL Final     Hematocrit   Date Value Ref Range Status   05/30/2024 31.1 (L) 36.0 - 46.0 % Final   05/09/2024 31.2 (L) 36.0 - 46.0 % Final   04/25/2024 31.2 (L) 36.0 - 46.0 % Final     MCV   Date/Time Value Ref Range Status   05/30/2024 08:23 AM 95 80 - 100 fL Final   05/09/2024 09:10 AM 92 80 - 100 fL Final   04/25/2024 03:22 PM 86 80 - 100 fL Final     MCH    Date/Time Value Ref Range Status   05/30/2024 08:23 AM 29.0 26.0 - 34.0 pg Final   05/09/2024 09:10 AM 28.4 26.0 - 34.0 pg Final   04/25/2024 03:22 PM 27.1 26.0 - 34.0 pg Final     MCHC   Date/Time Value Ref Range Status   05/30/2024 08:23 AM 30.5 (L) 32.0 - 36.0 g/dL Final   05/09/2024 09:10 AM 30.8 (L) 32.0 - 36.0 g/dL Final   04/25/2024 03:22 PM 31.4 (L) 32.0 - 36.0 g/dL Final     RDW   Date/Time Value Ref Range Status   05/30/2024 08:23 AM 19.8 (H) 11.5 - 14.5 % Final   05/09/2024 09:10 AM 20.3 (H) 11.5 - 14.5 % Final   04/25/2024 03:22 PM 17.5 (H) 11.5 - 14.5 % Final     Platelets   Date/Time Value Ref Range Status   05/30/2024 08:23  150 - 450 x10*3/uL Final   05/09/2024 09:10  150 - 450 x10*3/uL Final     Comment:     Platelet count verified by smear review.   04/25/2024 03:22 PM 58 (L) 150 - 450 x10*3/uL Final     MPV   Date/Time Value Ref Range Status   09/24/2019 09:55 AM 10.2 7.0 - 12.6 CU Final   09/10/2019 10:30 AM 10.6 7.0 - 12.6 CU Final   08/27/2019 09:53 AM 10.4 7.0 - 12.6 CU Final     Neutrophils %   Date/Time Value Ref Range Status   05/30/2024 08:23 AM 91.1 40.0 - 80.0 % Final   05/09/2024 09:10 AM 90.0 40.0 - 80.0 % Final   04/25/2024 03:22 PM 6.5 40.0 - 80.0 % Final     Immature Granulocytes %, Automated   Date/Time Value Ref Range Status   05/30/2024 08:23 AM 0.6 0.0 - 0.9 % Final     Comment:     Immature Granulocyte Count (IG) includes promyelocytes, myelocytes and metamyelocytes but does not include bands. Percent differential counts (%) should be interpreted in the context of the absolute cell counts (cells/UL).   05/09/2024 09:10 AM 0.5 0.0 - 0.9 % Final     Comment:     Immature Granulocyte Count (IG) includes promyelocytes, myelocytes and metamyelocytes but does not include bands. Percent differential counts (%) should be interpreted in the context of the absolute cell counts (cells/UL).   04/25/2024 03:22 PM 2.2 (H) 0.0 - 0.9 % Final     Comment:     Immature Granulocyte  Count (IG) includes promyelocytes, myelocytes and metamyelocytes but does not include bands. Percent differential counts (%) should be interpreted in the context of the absolute cell counts (cells/UL).     Lymphocytes %, Manual   Date/Time Value Ref Range Status   03/14/2024 12:16 PM 90.0 13.0 - 44.0 % Final     Lymphocytes %   Date/Time Value Ref Range Status   05/30/2024 08:23 AM 4.5 13.0 - 44.0 % Final   05/09/2024 09:10 AM 6.1 13.0 - 44.0 % Final   04/25/2024 03:22 PM 67.4 13.0 - 44.0 % Final     Monocytes %, Manual   Date/Time Value Ref Range Status   03/14/2024 12:16 PM 8.0 2.0 - 10.0 % Final     Monocytes %   Date/Time Value Ref Range Status   05/30/2024 08:23 AM 3.5 2.0 - 10.0 % Final   05/09/2024 09:10 AM 3.0 2.0 - 10.0 % Final   04/25/2024 03:22 PM 15.2 2.0 - 10.0 % Final     Eosinophils %, Manual   Date/Time Value Ref Range Status   03/14/2024 12:16 PM 0.0 0.0 - 6.0 % Final     Eosinophils %   Date/Time Value Ref Range Status   05/30/2024 08:23 AM 0.1 0.0 - 6.0 % Final   05/09/2024 09:10 AM 0.3 0.0 - 6.0 % Final   04/25/2024 03:22 PM 6.5 0.0 - 6.0 % Final     Basophils %, Manual   Date/Time Value Ref Range Status   03/14/2024 12:16 PM 2.0 0.0 - 2.0 % Final     Basophils %   Date/Time Value Ref Range Status   05/30/2024 08:23 AM 0.2 0.0 - 2.0 % Final   05/09/2024 09:10 AM 0.1 0.0 - 2.0 % Final   04/25/2024 03:22 PM 2.2 0.0 - 2.0 % Final     Neutrophils Absolute   Date/Time Value Ref Range Status   05/30/2024 08:23 AM 11.44 (H) 1.60 - 5.50 x10*3/uL Final     Comment:     Percent differential counts (%) should be interpreted in the context of the absolute cell counts (cells/uL).   05/09/2024 09:10 AM 9.73 (H) 1.60 - 5.50 x10*3/uL Final     Comment:     Percent differential counts (%) should be interpreted in the context of the absolute cell counts (cells/uL).   04/25/2024 03:22 PM 0.03 (L) 1.60 - 5.50 x10*3/uL Final     Comment:     Percent differential counts (%) should be interpreted in the context of the  absolute cell counts (cells/uL).     Immature Granulocytes Absolute, Automated   Date/Time Value Ref Range Status   05/30/2024 08:23 AM 0.08 0.00 - 0.50 x10*3/uL Final   05/09/2024 09:10 AM 0.05 0.00 - 0.50 x10*3/uL Final   04/25/2024 03:22 PM 0.01 0.00 - 0.50 x10*3/uL Final     Lymphocytes Absolute   Date/Time Value Ref Range Status   05/30/2024 08:23 AM 0.57 (L) 0.80 - 3.00 x10*3/uL Final   05/09/2024 09:10 AM 0.66 (L) 0.80 - 3.00 x10*3/uL Final   04/25/2024 03:22 PM 0.31 (L) 0.80 - 3.00 x10*3/uL Final     Monocytes Absolute   Date/Time Value Ref Range Status   05/30/2024 08:23 AM 0.44 0.05 - 0.80 x10*3/uL Final   05/09/2024 09:10 AM 0.32 0.05 - 0.80 x10*3/uL Final   04/25/2024 03:22 PM 0.07 0.05 - 0.80 x10*3/uL Final     Eosinophils Absolute   Date/Time Value Ref Range Status   05/30/2024 08:23 AM 0.01 0.00 - 0.40 x10*3/uL Final   05/09/2024 09:10 AM 0.03 0.00 - 0.40 x10*3/uL Final   04/25/2024 03:22 PM 0.03 0.00 - 0.40 x10*3/uL Final     Eosinophils Absolute, Manual   Date/Time Value Ref Range Status   03/14/2024 12:16 PM 0.00 0.00 - 0.40 x10*3/uL Final     Basophils Absolute   Date/Time Value Ref Range Status   05/30/2024 08:23 AM 0.02 0.00 - 0.10 x10*3/uL Final   05/09/2024 09:10 AM 0.01 0.00 - 0.10 x10*3/uL Final     Comment:     Automated WBC differential has been confirmed by manual smear.   04/25/2024 03:22 PM 0.01 0.00 - 0.10 x10*3/uL Final     Basophils Absolute, Manual   Date/Time Value Ref Range Status   03/14/2024 12:16 PM 0.01 0.00 - 0.10 x10*3/uL Final     2D echo reviewed.  Satisfactory ejection fraction and no discernible abnormal wall imaging.  PERITONEUM/RETROPERITONEUM/LYMPH NODES: CT 3/14/2024  No ascites or free air, no fluid collection.  Celiac axis adenopathy with largest node 3.2 cm, decreased from 4.5 cm on prior. Periportal adenopathy with largest alis mass 3.1 cm, previously 3.8 cm. 6.8 cm mass in right lower mesentery, previously 8.2 cm. The alis masses  also appear to have more  fluid density centrally. Several mildly enlarged periaortic lymph nodes, difficult to measure, appear to be decreasing in volume compared to prior.      BONES AND ABDOMINAL WALL:  No suspicious osseous lesions are identified.  The abdominal wall soft tissues appear normal.      IMPRESSION:  No acute finding. Evidence of positive treatment response to chemotherapy with some  reduction in volume of abdominal adenopathy. Mesenteric mass in right lower abdomen is is of lower density suggesting necrosis.      Signed by: Melquiades Bales 3/14/2024    Assessment/Plan      For cycle 5 Rituxan CHOP today.  Cleared for therapy.  Scheduled for PET CT scan at the completion of cycle 6 after which patient will be reviewed with MD visit.     Diagnoses and all orders for this visit:  Diffuse large B-cell lymphoma, unspecified body region (Multi)  -     Clinic Appointment Request           Contreras Rowell MD

## 2024-06-03 ASSESSMENT — ENCOUNTER SYMPTOMS
CARDIOVASCULAR NEGATIVE: 1
CONSTITUTIONAL NEGATIVE: 1
RESPIRATORY NEGATIVE: 1
EYES NEGATIVE: 1

## 2024-06-20 ENCOUNTER — INFUSION (OUTPATIENT)
Dept: HEMATOLOGY/ONCOLOGY | Facility: CLINIC | Age: 74
End: 2024-06-20
Payer: MEDICARE

## 2024-06-20 ENCOUNTER — APPOINTMENT (OUTPATIENT)
Dept: HEMATOLOGY/ONCOLOGY | Facility: CLINIC | Age: 74
End: 2024-06-20
Payer: MEDICARE

## 2024-06-20 ENCOUNTER — NUTRITION (OUTPATIENT)
Dept: HEMATOLOGY/ONCOLOGY | Facility: CLINIC | Age: 74
End: 2024-06-20
Payer: MEDICARE

## 2024-06-20 VITALS — WEIGHT: 178.79 LBS | BODY MASS INDEX: 30.52 KG/M2 | HEIGHT: 64 IN

## 2024-06-20 VITALS
DIASTOLIC BLOOD PRESSURE: 74 MMHG | HEART RATE: 84 BPM | TEMPERATURE: 97.2 F | BODY MASS INDEX: 30.67 KG/M2 | OXYGEN SATURATION: 95 % | RESPIRATION RATE: 18 BRPM | SYSTOLIC BLOOD PRESSURE: 164 MMHG | WEIGHT: 178.79 LBS

## 2024-06-20 DIAGNOSIS — C83.30 DIFFUSE LARGE B-CELL LYMPHOMA, UNSPECIFIED BODY REGION (MULTI): ICD-10-CM

## 2024-06-20 DIAGNOSIS — C85.10 B-CELL LYMPHOMA, UNSPECIFIED B-CELL LYMPHOMA TYPE, UNSPECIFIED BODY REGION (MULTI): ICD-10-CM

## 2024-06-20 LAB
ALBUMIN SERPL BCP-MCNC: 3.8 G/DL (ref 3.4–5)
ALP SERPL-CCNC: 92 U/L (ref 33–136)
ALT SERPL W P-5'-P-CCNC: 10 U/L (ref 7–45)
ANION GAP SERPL CALC-SCNC: 12 MMOL/L (ref 10–20)
AST SERPL W P-5'-P-CCNC: 11 U/L (ref 9–39)
BASOPHILS # BLD AUTO: 0.02 X10*3/UL (ref 0–0.1)
BASOPHILS NFR BLD AUTO: 0.2 %
BILIRUB SERPL-MCNC: 0.4 MG/DL (ref 0–1.2)
BUN SERPL-MCNC: 16 MG/DL (ref 6–23)
CALCIUM SERPL-MCNC: 9.3 MG/DL (ref 8.6–10.3)
CHLORIDE SERPL-SCNC: 105 MMOL/L (ref 98–107)
CO2 SERPL-SCNC: 27 MMOL/L (ref 21–32)
CREAT SERPL-MCNC: 0.76 MG/DL (ref 0.5–1.05)
EGFRCR SERPLBLD CKD-EPI 2021: 81 ML/MIN/1.73M*2
EOSINOPHIL # BLD AUTO: 0.01 X10*3/UL (ref 0–0.4)
EOSINOPHIL NFR BLD AUTO: 0.1 %
ERYTHROCYTE [DISTWIDTH] IN BLOOD BY AUTOMATED COUNT: 17.5 % (ref 11.5–14.5)
GLUCOSE SERPL-MCNC: 114 MG/DL (ref 74–99)
HCT VFR BLD AUTO: 30.8 % (ref 36–46)
HGB BLD-MCNC: 9.4 G/DL (ref 12–16)
IMM GRANULOCYTES # BLD AUTO: 0.03 X10*3/UL (ref 0–0.5)
IMM GRANULOCYTES NFR BLD AUTO: 0.3 % (ref 0–0.9)
LDH SERPL L TO P-CCNC: 139 U/L (ref 84–246)
LYMPHOCYTES # BLD AUTO: 0.53 X10*3/UL (ref 0.8–3)
LYMPHOCYTES NFR BLD AUTO: 4.5 %
MCH RBC QN AUTO: 30 PG (ref 26–34)
MCHC RBC AUTO-ENTMCNC: 30.5 G/DL (ref 32–36)
MCV RBC AUTO: 98 FL (ref 80–100)
MONOCYTES # BLD AUTO: 0.37 X10*3/UL (ref 0.05–0.8)
MONOCYTES NFR BLD AUTO: 3.1 %
NEUTROPHILS # BLD AUTO: 10.93 X10*3/UL (ref 1.6–5.5)
NEUTROPHILS NFR BLD AUTO: 91.8 %
PLATELET # BLD AUTO: 204 X10*3/UL (ref 150–450)
POTASSIUM SERPL-SCNC: 4 MMOL/L (ref 3.5–5.3)
PROT SERPL-MCNC: 6.1 G/DL (ref 6.4–8.2)
RBC # BLD AUTO: 3.13 X10*6/UL (ref 4–5.2)
SODIUM SERPL-SCNC: 140 MMOL/L (ref 136–145)
WBC # BLD AUTO: 11.9 X10*3/UL (ref 4.4–11.3)

## 2024-06-20 PROCEDURE — 85025 COMPLETE CBC W/AUTO DIFF WBC: CPT | Performed by: INTERNAL MEDICINE

## 2024-06-20 PROCEDURE — 2500000004 HC RX 250 GENERAL PHARMACY W/ HCPCS (ALT 636 FOR OP/ED): Performed by: INTERNAL MEDICINE

## 2024-06-20 PROCEDURE — 2500000001 HC RX 250 WO HCPCS SELF ADMINISTERED DRUGS (ALT 637 FOR MEDICARE OP): Performed by: INTERNAL MEDICINE

## 2024-06-20 PROCEDURE — 96417 CHEMO IV INFUS EACH ADDL SEQ: CPT

## 2024-06-20 PROCEDURE — 96415 CHEMO IV INFUSION ADDL HR: CPT

## 2024-06-20 PROCEDURE — 96413 CHEMO IV INFUSION 1 HR: CPT

## 2024-06-20 PROCEDURE — 83615 LACTATE (LD) (LDH) ENZYME: CPT | Performed by: INTERNAL MEDICINE

## 2024-06-20 PROCEDURE — 84075 ASSAY ALKALINE PHOSPHATASE: CPT | Performed by: INTERNAL MEDICINE

## 2024-06-20 PROCEDURE — 96377 APPLICATON ON-BODY INJECTOR: CPT

## 2024-06-20 PROCEDURE — 96375 TX/PRO/DX INJ NEW DRUG ADDON: CPT | Mod: INF

## 2024-06-20 PROCEDURE — 96411 CHEMO IV PUSH ADDL DRUG: CPT

## 2024-06-20 PROCEDURE — 96367 TX/PROPH/DG ADDL SEQ IV INF: CPT

## 2024-06-20 RX ORDER — HEPARIN 100 UNIT/ML
500 SYRINGE INTRAVENOUS AS NEEDED
Status: DISCONTINUED | OUTPATIENT
Start: 2024-06-20 | End: 2024-06-20 | Stop reason: HOSPADM

## 2024-06-20 RX ORDER — FAMOTIDINE 10 MG/ML
20 INJECTION INTRAVENOUS ONCE AS NEEDED
Status: DISCONTINUED | OUTPATIENT
Start: 2024-06-20 | End: 2024-06-20 | Stop reason: HOSPADM

## 2024-06-20 RX ORDER — DIPHENHYDRAMINE HCL 25 MG
50 CAPSULE ORAL ONCE
Status: COMPLETED | OUTPATIENT
Start: 2024-06-20 | End: 2024-06-20

## 2024-06-20 RX ORDER — EPINEPHRINE 0.3 MG/.3ML
0.3 INJECTION SUBCUTANEOUS EVERY 5 MIN PRN
Status: DISCONTINUED | OUTPATIENT
Start: 2024-06-20 | End: 2024-06-20 | Stop reason: HOSPADM

## 2024-06-20 RX ORDER — PROCHLORPERAZINE EDISYLATE 5 MG/ML
10 INJECTION INTRAMUSCULAR; INTRAVENOUS EVERY 6 HOURS PRN
Status: DISCONTINUED | OUTPATIENT
Start: 2024-06-20 | End: 2024-06-20 | Stop reason: HOSPADM

## 2024-06-20 RX ORDER — DIPHENHYDRAMINE HYDROCHLORIDE 50 MG/ML
50 INJECTION INTRAMUSCULAR; INTRAVENOUS AS NEEDED
Status: DISCONTINUED | OUTPATIENT
Start: 2024-06-20 | End: 2024-06-20 | Stop reason: HOSPADM

## 2024-06-20 RX ORDER — ACETAMINOPHEN 325 MG/1
650 TABLET ORAL ONCE
Status: COMPLETED | OUTPATIENT
Start: 2024-06-20 | End: 2024-06-20

## 2024-06-20 RX ORDER — ALBUTEROL SULFATE 0.83 MG/ML
3 SOLUTION RESPIRATORY (INHALATION) AS NEEDED
Status: DISCONTINUED | OUTPATIENT
Start: 2024-06-20 | End: 2024-06-20 | Stop reason: HOSPADM

## 2024-06-20 RX ORDER — DOXORUBICIN HYDROCHLORIDE 2 MG/ML
50 INJECTION, SOLUTION INTRAVENOUS ONCE
Status: COMPLETED | OUTPATIENT
Start: 2024-06-20 | End: 2024-06-20

## 2024-06-20 RX ORDER — HEPARIN SODIUM,PORCINE/PF 10 UNIT/ML
50 SYRINGE (ML) INTRAVENOUS AS NEEDED
OUTPATIENT
Start: 2024-06-20

## 2024-06-20 RX ORDER — PALONOSETRON 0.05 MG/ML
0.25 INJECTION, SOLUTION INTRAVENOUS ONCE
Status: COMPLETED | OUTPATIENT
Start: 2024-06-20 | End: 2024-06-20

## 2024-06-20 RX ORDER — PROCHLORPERAZINE MALEATE 10 MG
10 TABLET ORAL EVERY 6 HOURS PRN
Status: DISCONTINUED | OUTPATIENT
Start: 2024-06-20 | End: 2024-06-20 | Stop reason: HOSPADM

## 2024-06-20 RX ORDER — HEPARIN 100 UNIT/ML
500 SYRINGE INTRAVENOUS AS NEEDED
OUTPATIENT
Start: 2024-06-20

## 2024-06-20 ASSESSMENT — PAIN SCALES - GENERAL: PAINLEVEL: 0-NO PAIN

## 2024-06-20 NOTE — PROGRESS NOTES
"NUTRITION Follow-up NOTE    Nutrition Assessment     Reason for Visit:  Kellie Rae is a 74 y.o. female who presents for diffuse large B cell lymphoma.   Hx: HTN, HLD  Current tx: RCHOP  Started: 3/7/24    Nutrition consult for weight loss.  Visited with pt today during treatment for follow-up     Lab Results   Component Value Date/Time    GLUCOSE 114 (H) 06/20/2024 0851     06/20/2024 0851    K 4.0 06/20/2024 0851     06/20/2024 0851    CO2 27 06/20/2024 0851    ANIONGAP 12 06/20/2024 0851    BUN 16 06/20/2024 0851    CREATININE 0.76 06/20/2024 0851    EGFR 81 06/20/2024 0851    CALCIUM 9.3 06/20/2024 0851    ALBUMIN 3.8 06/20/2024 0851    ALKPHOS 92 06/20/2024 0851    PROT 6.1 (L) 06/20/2024 0851    AST 11 06/20/2024 0851    BILITOT 0.4 06/20/2024 0851    ALT 10 06/20/2024 0851     No results found for: \"VITD25\"    Anthropometrics:  Anthropometrics  Height: 162.6 cm (5' 4.02\")  Weight: 81.1 kg (178 lb 12.7 oz)  BMI (Calculated): 30.67  IBW/kg (Dietitian Calculated): 54.5 kg  Adjusted Body Weight (kg): 63 kg  Weight Change  Weight History / % Weight Change: 2.4% loss in the last month; 2.9% loss in 3 months; 11.5% loss in 4 months; noted but not significant at this time  Significant Weight Loss: No        Wt Readings from Last 10 Encounters:   06/20/24 81.1 kg (178 lb 12.7 oz)   06/20/24 81.1 kg (178 lb 12.7 oz)   05/30/24 81.4 kg (179 lb 7.3 oz)   05/09/24 83.1 kg (183 lb 3.2 oz)   05/09/24 83.1 kg (183 lb 3.2 oz)   04/25/24 83 kg (182 lb 15.7 oz)   04/25/24 83 kg (183 lb 1.5 oz)   04/18/24 83.7 kg (184 lb 8.4 oz)   04/18/24 83.7 kg (184 lb 8.4 oz)   03/28/24 83.6 kg (184 lb 4.9 oz)        Food And Nutrient Intake:  Food and Nutrient History  Food and Nutrient History: Dysgeusia intermittent; did have rare steak- which tasted good, no issues.  Energy Intake: Fair 50-75 % (variable at times)  Fluid Intake: water (sips throughout the night, 16.9oz), gatorade uses on sick day/ occasionally, " "raspberry ice tea (sweetened, caffeineated - 32ounces daily)  GI Symptoms: none  Oral Problems: dysgeusia     Food Intake  Amount of Food: Portions remain small. Shrimp, BBQ ribs have all tasted good- others tasted not good.  Meal 1: B = crossiant with scrambled egg with braun/ cheese  Meal 3: D = hot dog with bun/ onion - milk shake                                                        Nutrition Focused Physical Exam Findings:  Completed 3/7/24                        Energy Needs  Calculated Energy Needs Using Equations  Height: 162.6 cm (5' 4.02\")  Weight Used for Equation Calculations: 63 kg (138 lb 14.2 oz) (adjusted weight)  [place] Equation (Overweight or Obese Patients): 1105  Estimated Energy Needs  Total Energy Estimated Needs (kCal): 1890 kCal  Total Estimated Energy Need per Day (kCal/kg): 30 kCal/kg  Estimated Fluid Needs  Total Fluid Estimated Needs (mL): 1890 mL  Total Fluid Estimated Needs (mL/kg): 30 mL/kg  Method for Estimating Needs: additional needs for diarrhea/vomiting  Estimated Protein Needs  Total Protein Estimated Needs (g): 75.6 g  Total Protein Estimated Needs (g/kg): 1.2 g/kg  Method for Estimating Needs: up to 88g/day        Nutrition Diagnosis   Malnutrition Diagnosis  Patient has Malnutrition Diagnosis: Yes  Diagnosis Status: Ongoing  Malnutrition Diagnosis: Mild malnutrition related to chronic disease or condition  As Evidenced by: oral intake < 75% of  needs for at least 1 month, pt reporting nutrition impact symptoms of mucositis, dysgeusia, xerostomia, constipation, nausea, anorexia, while undergoing treatment for DLBCL. (Weight loss is not significant at this time; weight has stablized with some improvement in intake throughout treatment.)    Nutrition Diagnosis  Patient has Nutrition Diagnosis: Yes  Diagnosis Status (1): Ongoing  Nutrition Diagnosis 1: Predicted inadequate energy intake  Related to (1): potential for nutrition impact symptoms with treatment  As " Evidenced by (1): pt undergoing chemotherapy for DLBCL.  Additional Assessment Information (1): weight loss not significant at this time    Nutrition Interventions/Recommendations   Nutrition Prescription  Individualized Nutrition Prescription Provided for : Nutrition during cancer treatment, management of nutrition impact symptom, nutrition following treatment.    Food and Nutrition Delivery  Food and Nutrition Delivery  Meals & Snacks: Energy-modified diet, Protein-modified diet, Fluid-modified diet, General Healthful Diet  Goals: Continue with adequate calories/ protein to maintain weight. Continue with multiple meals/ snacks daily- no skipping meals. Discuss transitioning to a general healthful diet following treatment; encouraged a plant based diet, encouraged reducing intake of simply sugars, incorporating more fruits/ vegetables/ lean proteins as able.  Medical Food Supplement: Commercial beverage  Goals: Use until appetite improves    Nutrition Education  Nutrition Education  Nutrition Education Content: Content related nutrition education  Goals: High calorie high protein diet with transition to general healthful diet to obtain healthy weight    Coordination of Care       There are no Patient Instructions on file for this visit.    Nutrition Monitoring and Evaluation   Food/Nutrient Related History Monitoring  Monitoring and Evaluation Plan: Energy intake, Fluid intake, Meal/snack pattern, Protein intake, Amount of food  Energy Intake: Estimated energy intake  Criteria: Meet at least 75% of caloric needs  Fluid Intake: Estimated fluid intake  Criteria: Maintain hydration; 63+oz daily  Amount of Food: Medical food intake  Criteria: lower lactose options as able  Meal/Snack Pattern: Estimated meal and snack pattern  Criteria: 4-6 meals/snacks per day at least 50% of the time  Estimated protein intake: Estimated protein intake  Criteria: Meet at least 75% of protein needs orally  Body Composition/Growth/Weight  History  Monitoring and Evaluation Plan: Weight  Weight: Measured weight  Criteria: Maintain weight          Time Spent  Prep time on day of patient encounter: 5 minutes  Time spent directly with patient, family or caregiver: 15 minutes  Additional Time Spent on Patient Care Activities: 0 minutes  Documentation Time: 10 minutes  Other Time Spent: 0 minutes  Total: 30 minutes

## 2024-06-20 NOTE — PROGRESS NOTES
Rate:   45ml/hr for 22.5 ml   91ml/hr for 45.5ml  136ml/hr for 68  182ml/hr for remaining volume.

## 2024-07-02 ENCOUNTER — HOSPITAL ENCOUNTER (OUTPATIENT)
Dept: RADIOLOGY | Facility: CLINIC | Age: 74
Discharge: HOME | End: 2024-07-02
Payer: MEDICARE

## 2024-07-02 DIAGNOSIS — C78.5 SECONDARY MALIGNANT NEOPLASM OF LARGE INTESTINE AND RECTUM (MULTI): ICD-10-CM

## 2024-07-02 DIAGNOSIS — C83.30 DIFFUSE LARGE B-CELL LYMPHOMA, UNSPECIFIED BODY REGION (MULTI): ICD-10-CM

## 2024-07-02 PROCEDURE — 78815 PET IMAGE W/CT SKULL-THIGH: CPT | Performed by: RADIOLOGY

## 2024-07-02 PROCEDURE — 78815 PET IMAGE W/CT SKULL-THIGH: CPT

## 2024-07-02 PROCEDURE — A9552 F18 FDG: HCPCS | Performed by: INTERNAL MEDICINE

## 2024-07-02 PROCEDURE — 3430000001 HC RX 343 DIAGNOSTIC RADIOPHARMACEUTICALS: Performed by: INTERNAL MEDICINE

## 2024-07-02 RX ORDER — FLUDEOXYGLUCOSE F 18 200 MCI/ML
11.4 INJECTION, SOLUTION INTRAVENOUS
Status: COMPLETED | OUTPATIENT
Start: 2024-07-02 | End: 2024-07-02

## 2024-07-09 ENCOUNTER — OFFICE VISIT (OUTPATIENT)
Dept: HEMATOLOGY/ONCOLOGY | Facility: CLINIC | Age: 74
End: 2024-07-09
Payer: MEDICARE

## 2024-07-09 VITALS
HEART RATE: 78 BPM | DIASTOLIC BLOOD PRESSURE: 85 MMHG | BODY MASS INDEX: 30.52 KG/M2 | RESPIRATION RATE: 17 BRPM | SYSTOLIC BLOOD PRESSURE: 147 MMHG | TEMPERATURE: 98.1 F | OXYGEN SATURATION: 98 % | WEIGHT: 177.91 LBS

## 2024-07-09 DIAGNOSIS — C78.5 SECONDARY MALIGNANT NEOPLASM OF LARGE INTESTINE AND RECTUM (MULTI): ICD-10-CM

## 2024-07-09 DIAGNOSIS — C83.30 DIFFUSE LARGE B-CELL LYMPHOMA, UNSPECIFIED BODY REGION (MULTI): ICD-10-CM

## 2024-07-09 PROCEDURE — 1159F MED LIST DOCD IN RCRD: CPT | Performed by: INTERNAL MEDICINE

## 2024-07-09 PROCEDURE — 99214 OFFICE O/P EST MOD 30 MIN: CPT | Performed by: INTERNAL MEDICINE

## 2024-07-09 PROCEDURE — 3079F DIAST BP 80-89 MM HG: CPT | Performed by: INTERNAL MEDICINE

## 2024-07-09 PROCEDURE — 1126F AMNT PAIN NOTED NONE PRSNT: CPT | Performed by: INTERNAL MEDICINE

## 2024-07-09 PROCEDURE — 3077F SYST BP >= 140 MM HG: CPT | Performed by: INTERNAL MEDICINE

## 2024-07-09 SDOH — ECONOMIC STABILITY: FOOD INSECURITY: WITHIN THE PAST 12 MONTHS, YOU WORRIED THAT YOUR FOOD WOULD RUN OUT BEFORE YOU GOT MONEY TO BUY MORE.: NEVER TRUE

## 2024-07-09 SDOH — ECONOMIC STABILITY: FOOD INSECURITY: WITHIN THE PAST 12 MONTHS, THE FOOD YOU BOUGHT JUST DIDN'T LAST AND YOU DIDN'T HAVE MONEY TO GET MORE.: NEVER TRUE

## 2024-07-09 ASSESSMENT — PAIN SCALES - GENERAL: PAINLEVEL: 0-NO PAIN

## 2024-07-09 NOTE — PATIENT INSTRUCTIONS
Today you met with your hematologist/oncologist.  Recent labs were discussed and questions answered.  Scheduling orders were placed.  While we appreciate that you verbalized understanding, if any questions arise after leaving, please do not hesitate to call the office to discuss.  [phone] Peter Yousif.  Dr Rowell will see you in 6 months to review your PET scan. Port flushes have been scheduled through that appointment.

## 2024-07-09 NOTE — PROGRESS NOTES
Patient ID: Kellie Rae is a 74 y.o. female.  Referring Physician: Contreras Rowell MD  [id number] [place] [address]  Primary Care Provider: Douglas Yañez MD  Visit Type:  Follow Up       Subjective    HPI    Referred by primary care doctor because of the results of CT chest abdomen and pelvis showing a soft tissue mass lesion within the mesentery with multiple retroperitoneal and mesenteric lymph and adenopathy.  Radiographically the findings could represent lymphoma.  I was able to elicit from had that his symptoms began about 5 years ago with pain in the abdomen and the back she subsequently was diagnosed with gout and it is not clear the etiology of the of the gout currently on allopurinol and colchicine.  She is also on nonsteroidals anti-inflammatory agents as well.  She was referred to medical oncology because a CT scan of the abdomen showed intra-abdominal lesions reminiscent of lymphadenopathy/Lymphoma.  Biopsy has been ordered to confirm the diagnosis.     FINAL DIAGNOSIS      A. SOFT TISSUE MASS, MESENTERY, BIOPSY:    DIFFUSE LARGE B CELL LYMPHOMA, FINAL CLASSIFICATION PENDING GENETIC STUDIES,    SUBTYPE BY PALMA CRITERIA:  NON GERMINAL CENTER CELL TYPE   MYC/BCL-2 EXPRESSION: NOT A DOUBLE EXPRESSOR (MYC-,BCL-2+)     Completed 6 cycle R CHOP : 3/7/24-6/20/24 Q 21 D cycle: PET scan negative for evidence of residual hypermetabolic disease see below.    Review of Systems   Constitutional: Negative.    HENT:  Negative.     Eyes: Negative.    Respiratory: Negative.     Cardiovascular: Negative.       IMPRESSION:  1. No evidence of hypermetabolic lymphadenopathy or metastatic disease throughout the body ([place] 1). 2. Diffuse FDG uptake within the bone marrow, which is nonspecific, however likely related to patient's ongoing chemotherapy. No focal  osseous FDG uptake to suggest osseous metastatic disease.      Signed by: Jonathan Garcia 7/2/2024  Objective   BSA: There is no  height or weight on file to calculate BSA.  There were no vitals taken for this visit.     has a past medical history of GERD (gastroesophageal reflux disease), Gout, Hyperlipidemia, Hypertension, Hypothyroidism, and Sleep apnea.   has a past surgical history that includes Appendectomy; Colonoscopy; and Cataract extraction (Bilateral).  No family history on file.  Oncology History   Lymphoma (Multi)   2/23/2024 Initial Diagnosis    Lymphoma (CMS/HCC)     3/7/2024 -  Chemotherapy    R-CHOP (Cyclophosphamide / DOXOrubicin / VinCRIStine / PredniSONE) + RiTUXimab, 21 Day Cycles         Kellie Rae  reports that she quit smoking about 25 years ago. Her smoking use included cigarettes. She started smoking about 56 years ago. She has a 31 pack-year smoking history. She has never used smokeless tobacco.  She  reports no history of alcohol use.  She  reports no history of drug use.    Physical Exam  Constitutional:       Appearance: Normal appearance.   HENT:      Head: Normocephalic and atraumatic.   Eyes:      Extraocular Movements: Extraocular movements intact.      Pupils: Pupils are equal, round, and reactive to light.   Pulmonary:      Effort: Pulmonary effort is normal.      Breath sounds: Normal breath sounds.   Neurological:      Mental Status: She is alert.         WBC   Date/Time Value Ref Range Status   06/20/2024 08:51 AM 11.9 (H) 4.4 - 11.3 x10*3/uL Final   05/30/2024 08:23 AM 12.6 (H) 4.4 - 11.3 x10*3/uL Final   05/09/2024 09:10 AM 10.8 4.4 - 11.3 x10*3/uL Final     nRBC   Date Value Ref Range Status   05/09/2024   Final     Comment:     Not Measured   04/25/2024   Final     Comment:     Not Measured   03/14/2024 0.0 0.0 - 0.0 /100 WBCs Final     RBC   Date Value Ref Range Status   06/20/2024 3.13 (L) 4.00 - 5.20 x10*6/uL Final   05/30/2024 3.28 (L) 4.00 - 5.20 x10*6/uL Final   05/09/2024 3.38 (L) 4.00 - 5.20 x10*6/uL Final     Hemoglobin   Date Value Ref Range Status   06/20/2024 9.4 (L) 12.0 - 16.0  g/dL Final   05/30/2024 9.5 (L) 12.0 - 16.0 g/dL Final   05/09/2024 9.6 (L) 12.0 - 16.0 g/dL Final     Hematocrit   Date Value Ref Range Status   06/20/2024 30.8 (L) 36.0 - 46.0 % Final   05/30/2024 31.1 (L) 36.0 - 46.0 % Final   05/09/2024 31.2 (L) 36.0 - 46.0 % Final     MCV   Date/Time Value Ref Range Status   06/20/2024 08:51 AM 98 80 - 100 fL Final   05/30/2024 08:23 AM 95 80 - 100 fL Final   05/09/2024 09:10 AM 92 80 - 100 fL Final     MCH   Date/Time Value Ref Range Status   06/20/2024 08:51 AM 30.0 26.0 - 34.0 pg Final   05/30/2024 08:23 AM 29.0 26.0 - 34.0 pg Final   05/09/2024 09:10 AM 28.4 26.0 - 34.0 pg Final     MCHC   Date/Time Value Ref Range Status   06/20/2024 08:51 AM 30.5 (L) 32.0 - 36.0 g/dL Final   05/30/2024 08:23 AM 30.5 (L) 32.0 - 36.0 g/dL Final   05/09/2024 09:10 AM 30.8 (L) 32.0 - 36.0 g/dL Final     RDW   Date/Time Value Ref Range Status   06/20/2024 08:51 AM 17.5 (H) 11.5 - 14.5 % Final   05/30/2024 08:23 AM 19.8 (H) 11.5 - 14.5 % Final   05/09/2024 09:10 AM 20.3 (H) 11.5 - 14.5 % Final     Platelets   Date/Time Value Ref Range Status   06/20/2024 08:51  150 - 450 x10*3/uL Final   05/30/2024 08:23  150 - 450 x10*3/uL Final   05/09/2024 09:10  150 - 450 x10*3/uL Final     Comment:     Platelet count verified by smear review.     MPV   Date/Time Value Ref Range Status   09/24/2019 09:55 AM 10.2 7.0 - 12.6 CU Final   09/10/2019 10:30 AM 10.6 7.0 - 12.6 CU Final   08/27/2019 09:53 AM 10.4 7.0 - 12.6 CU Final     Neutrophils %   Date/Time Value Ref Range Status   06/20/2024 08:51 AM 91.8 40.0 - 80.0 % Final   05/30/2024 08:23 AM 91.1 40.0 - 80.0 % Final   05/09/2024 09:10 AM 90.0 40.0 - 80.0 % Final     Immature Granulocytes %, Automated   Date/Time Value Ref Range Status   06/20/2024 08:51 AM 0.3 0.0 - 0.9 % Final     Comment:     Immature Granulocyte Count (IG) includes promyelocytes, myelocytes and metamyelocytes but does not include bands. Percent differential counts (%)  should be interpreted in the context of the absolute cell counts (cells/UL).   05/30/2024 08:23 AM 0.6 0.0 - 0.9 % Final     Comment:     Immature Granulocyte Count (IG) includes promyelocytes, myelocytes and metamyelocytes but does not include bands. Percent differential counts (%) should be interpreted in the context of the absolute cell counts (cells/UL).   05/09/2024 09:10 AM 0.5 0.0 - 0.9 % Final     Comment:     Immature Granulocyte Count (IG) includes promyelocytes, myelocytes and metamyelocytes but does not include bands. Percent differential counts (%) should be interpreted in the context of the absolute cell counts (cells/UL).     Lymphocytes %, Manual   Date/Time Value Ref Range Status   03/14/2024 12:16 PM 90.0 13.0 - 44.0 % Final     Lymphocytes %   Date/Time Value Ref Range Status   06/20/2024 08:51 AM 4.5 13.0 - 44.0 % Final   05/30/2024 08:23 AM 4.5 13.0 - 44.0 % Final   05/09/2024 09:10 AM 6.1 13.0 - 44.0 % Final     Monocytes %, Manual   Date/Time Value Ref Range Status   03/14/2024 12:16 PM 8.0 2.0 - 10.0 % Final     Monocytes %   Date/Time Value Ref Range Status   06/20/2024 08:51 AM 3.1 2.0 - 10.0 % Final   05/30/2024 08:23 AM 3.5 2.0 - 10.0 % Final   05/09/2024 09:10 AM 3.0 2.0 - 10.0 % Final     Eosinophils %, Manual   Date/Time Value Ref Range Status   03/14/2024 12:16 PM 0.0 0.0 - 6.0 % Final     Eosinophils %   Date/Time Value Ref Range Status   06/20/2024 08:51 AM 0.1 0.0 - 6.0 % Final   05/30/2024 08:23 AM 0.1 0.0 - 6.0 % Final   05/09/2024 09:10 AM 0.3 0.0 - 6.0 % Final     Basophils %, Manual   Date/Time Value Ref Range Status   03/14/2024 12:16 PM 2.0 0.0 - 2.0 % Final     Basophils %   Date/Time Value Ref Range Status   06/20/2024 08:51 AM 0.2 0.0 - 2.0 % Final   05/30/2024 08:23 AM 0.2 0.0 - 2.0 % Final   05/09/2024 09:10 AM 0.1 0.0 - 2.0 % Final     Neutrophils Absolute   Date/Time Value Ref Range Status   06/20/2024 08:51 AM 10.93 (H) 1.60 - 5.50 x10*3/uL Final     Comment:      Percent differential counts (%) should be interpreted in the context of the absolute cell counts (cells/uL).   05/30/2024 08:23 AM 11.44 (H) 1.60 - 5.50 x10*3/uL Final     Comment:     Percent differential counts (%) should be interpreted in the context of the absolute cell counts (cells/uL).   05/09/2024 09:10 AM 9.73 (H) 1.60 - 5.50 x10*3/uL Final     Comment:     Percent differential counts (%) should be interpreted in the context of the absolute cell counts (cells/uL).     Immature Granulocytes Absolute, Automated   Date/Time Value Ref Range Status   06/20/2024 08:51 AM 0.03 0.00 - 0.50 x10*3/uL Final   05/30/2024 08:23 AM 0.08 0.00 - 0.50 x10*3/uL Final   05/09/2024 09:10 AM 0.05 0.00 - 0.50 x10*3/uL Final     Lymphocytes Absolute   Date/Time Value Ref Range Status   06/20/2024 08:51 AM 0.53 (L) 0.80 - 3.00 x10*3/uL Final   05/30/2024 08:23 AM 0.57 (L) 0.80 - 3.00 x10*3/uL Final   05/09/2024 09:10 AM 0.66 (L) 0.80 - 3.00 x10*3/uL Final     Monocytes Absolute   Date/Time Value Ref Range Status   06/20/2024 08:51 AM 0.37 0.05 - 0.80 x10*3/uL Final   05/30/2024 08:23 AM 0.44 0.05 - 0.80 x10*3/uL Final   05/09/2024 09:10 AM 0.32 0.05 - 0.80 x10*3/uL Final     Eosinophils Absolute   Date/Time Value Ref Range Status   06/20/2024 08:51 AM 0.01 0.00 - 0.40 x10*3/uL Final   05/30/2024 08:23 AM 0.01 0.00 - 0.40 x10*3/uL Final   05/09/2024 09:10 AM 0.03 0.00 - 0.40 x10*3/uL Final     Eosinophils Absolute, Manual   Date/Time Value Ref Range Status   03/14/2024 12:16 PM 0.00 0.00 - 0.40 x10*3/uL Final     Basophils Absolute   Date/Time Value Ref Range Status   06/20/2024 08:51 AM 0.02 0.00 - 0.10 x10*3/uL Final   05/30/2024 08:23 AM 0.02 0.00 - 0.10 x10*3/uL Final   05/09/2024 09:10 AM 0.01 0.00 - 0.10 x10*3/uL Final     Comment:     Automated WBC differential has been confirmed by manual smear.     Basophils Absolute, Manual   Date/Time Value Ref Range Status   03/14/2024 12:16 PM 0.01 0.00 - 0.10 x10*3/uL Final          Assessment/Plan      PET negative and RTC 6 months: Repeat PET in 6 months.     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

## 2024-07-10 ASSESSMENT — ENCOUNTER SYMPTOMS
CARDIOVASCULAR NEGATIVE: 1
EYES NEGATIVE: 1
CONSTITUTIONAL NEGATIVE: 1
RESPIRATORY NEGATIVE: 1

## 2024-08-15 ENCOUNTER — INFUSION (OUTPATIENT)
Dept: HEMATOLOGY/ONCOLOGY | Facility: CLINIC | Age: 74
End: 2024-08-15
Payer: MEDICARE

## 2024-08-15 DIAGNOSIS — C78.5 SECONDARY MALIGNANT NEOPLASM OF LARGE INTESTINE AND RECTUM (MULTI): ICD-10-CM

## 2024-08-15 DIAGNOSIS — C83.30 DIFFUSE LARGE B-CELL LYMPHOMA, UNSPECIFIED BODY REGION (MULTI): ICD-10-CM

## 2024-08-15 DIAGNOSIS — C85.10 B-CELL LYMPHOMA, UNSPECIFIED B-CELL LYMPHOMA TYPE, UNSPECIFIED BODY REGION (MULTI): ICD-10-CM

## 2024-08-15 PROCEDURE — 96523 IRRIG DRUG DELIVERY DEVICE: CPT

## 2024-08-15 PROCEDURE — 2500000004 HC RX 250 GENERAL PHARMACY W/ HCPCS (ALT 636 FOR OP/ED): Performed by: INTERNAL MEDICINE

## 2024-08-15 RX ORDER — HEPARIN SODIUM,PORCINE/PF 10 UNIT/ML
50 SYRINGE (ML) INTRAVENOUS AS NEEDED
OUTPATIENT
Start: 2024-08-15

## 2024-08-15 RX ORDER — HEPARIN 100 UNIT/ML
500 SYRINGE INTRAVENOUS AS NEEDED
OUTPATIENT
Start: 2024-08-15

## 2024-08-15 RX ORDER — HEPARIN 100 UNIT/ML
500 SYRINGE INTRAVENOUS AS NEEDED
Status: DISCONTINUED | OUTPATIENT
Start: 2024-08-15 | End: 2024-08-15 | Stop reason: HOSPADM

## 2024-08-15 RX ORDER — HEPARIN SODIUM,PORCINE/PF 10 UNIT/ML
50 SYRINGE (ML) INTRAVENOUS AS NEEDED
Status: DISCONTINUED | OUTPATIENT
Start: 2024-08-15 | End: 2024-08-15 | Stop reason: HOSPADM

## 2024-10-10 ENCOUNTER — INFUSION (OUTPATIENT)
Dept: HEMATOLOGY/ONCOLOGY | Facility: CLINIC | Age: 74
End: 2024-10-10
Payer: MEDICARE

## 2024-10-10 DIAGNOSIS — C83.30 DIFFUSE LARGE B-CELL LYMPHOMA, UNSPECIFIED BODY REGION (MULTI): ICD-10-CM

## 2024-10-10 DIAGNOSIS — C85.10 B-CELL LYMPHOMA, UNSPECIFIED B-CELL LYMPHOMA TYPE, UNSPECIFIED BODY REGION (MULTI): ICD-10-CM

## 2024-10-10 DIAGNOSIS — C78.5 SECONDARY MALIGNANT NEOPLASM OF LARGE INTESTINE AND RECTUM (MULTI): ICD-10-CM

## 2024-10-10 PROCEDURE — 2500000004 HC RX 250 GENERAL PHARMACY W/ HCPCS (ALT 636 FOR OP/ED): Performed by: INTERNAL MEDICINE

## 2024-10-10 PROCEDURE — 96523 IRRIG DRUG DELIVERY DEVICE: CPT

## 2024-10-10 RX ORDER — HEPARIN SODIUM,PORCINE/PF 10 UNIT/ML
50 SYRINGE (ML) INTRAVENOUS AS NEEDED
OUTPATIENT
Start: 2024-10-10

## 2024-10-10 RX ORDER — HEPARIN 100 UNIT/ML
500 SYRINGE INTRAVENOUS AS NEEDED
OUTPATIENT
Start: 2024-10-10

## 2024-10-10 RX ORDER — HEPARIN SODIUM,PORCINE/PF 10 UNIT/ML
50 SYRINGE (ML) INTRAVENOUS AS NEEDED
Status: DISCONTINUED | OUTPATIENT
Start: 2024-10-10 | End: 2024-10-10 | Stop reason: HOSPADM

## 2024-10-10 RX ORDER — HEPARIN 100 UNIT/ML
500 SYRINGE INTRAVENOUS AS NEEDED
Status: DISCONTINUED | OUTPATIENT
Start: 2024-10-10 | End: 2024-10-10 | Stop reason: HOSPADM

## 2024-12-05 ENCOUNTER — INFUSION (OUTPATIENT)
Dept: HEMATOLOGY/ONCOLOGY | Facility: CLINIC | Age: 74
End: 2024-12-05
Payer: MEDICARE

## 2024-12-05 DIAGNOSIS — C85.10 B-CELL LYMPHOMA, UNSPECIFIED B-CELL LYMPHOMA TYPE, UNSPECIFIED BODY REGION (MULTI): ICD-10-CM

## 2024-12-05 DIAGNOSIS — C83.30 DIFFUSE LARGE B-CELL LYMPHOMA, UNSPECIFIED BODY REGION (MULTI): ICD-10-CM

## 2024-12-05 DIAGNOSIS — C78.5 SECONDARY MALIGNANT NEOPLASM OF LARGE INTESTINE AND RECTUM (MULTI): ICD-10-CM

## 2024-12-05 PROCEDURE — 2500000004 HC RX 250 GENERAL PHARMACY W/ HCPCS (ALT 636 FOR OP/ED): Performed by: INTERNAL MEDICINE

## 2024-12-05 PROCEDURE — 96523 IRRIG DRUG DELIVERY DEVICE: CPT

## 2024-12-05 RX ORDER — HEPARIN 100 UNIT/ML
500 SYRINGE INTRAVENOUS AS NEEDED
Status: DISCONTINUED | OUTPATIENT
Start: 2024-12-05 | End: 2024-12-05 | Stop reason: HOSPADM

## 2024-12-05 RX ORDER — HEPARIN SODIUM,PORCINE/PF 10 UNIT/ML
50 SYRINGE (ML) INTRAVENOUS AS NEEDED
OUTPATIENT
Start: 2024-12-05

## 2024-12-05 RX ORDER — HEPARIN SODIUM,PORCINE/PF 10 UNIT/ML
50 SYRINGE (ML) INTRAVENOUS AS NEEDED
Status: DISCONTINUED | OUTPATIENT
Start: 2024-12-05 | End: 2024-12-05 | Stop reason: HOSPADM

## 2024-12-05 RX ORDER — HEPARIN 100 UNIT/ML
500 SYRINGE INTRAVENOUS AS NEEDED
OUTPATIENT
Start: 2024-12-05

## 2025-01-13 ENCOUNTER — HOSPITAL ENCOUNTER (OUTPATIENT)
Dept: RADIOLOGY | Facility: HOSPITAL | Age: 75
Discharge: HOME | End: 2025-01-13
Payer: MEDICARE

## 2025-01-13 ENCOUNTER — INFUSION (OUTPATIENT)
Dept: HEMATOLOGY/ONCOLOGY | Facility: CLINIC | Age: 75
End: 2025-01-13
Payer: MEDICARE

## 2025-01-13 VITALS
HEART RATE: 67 BPM | TEMPERATURE: 96.8 F | OXYGEN SATURATION: 96 % | BODY MASS INDEX: 32.48 KG/M2 | HEIGHT: 63 IN | WEIGHT: 183.31 LBS | RESPIRATION RATE: 18 BRPM | SYSTOLIC BLOOD PRESSURE: 176 MMHG | DIASTOLIC BLOOD PRESSURE: 76 MMHG

## 2025-01-13 DIAGNOSIS — C85.10 B-CELL LYMPHOMA, UNSPECIFIED B-CELL LYMPHOMA TYPE, UNSPECIFIED BODY REGION (MULTI): ICD-10-CM

## 2025-01-13 DIAGNOSIS — C83.30 DIFFUSE LARGE B-CELL LYMPHOMA, UNSPECIFIED BODY REGION (MULTI): ICD-10-CM

## 2025-01-13 DIAGNOSIS — C78.5 SECONDARY MALIGNANT NEOPLASM OF LARGE INTESTINE AND RECTUM (MULTI): ICD-10-CM

## 2025-01-13 PROCEDURE — 96523 IRRIG DRUG DELIVERY DEVICE: CPT

## 2025-01-13 PROCEDURE — A9552 F18 FDG: HCPCS | Performed by: INTERNAL MEDICINE

## 2025-01-13 PROCEDURE — 78815 PET IMAGE W/CT SKULL-THIGH: CPT | Mod: PS

## 2025-01-13 PROCEDURE — 78815 PET IMAGE W/CT SKULL-THIGH: CPT | Mod: PET TUMOR SUBSQ TX STRATEGY | Performed by: NUCLEAR MEDICINE

## 2025-01-13 PROCEDURE — 2500000004 HC RX 250 GENERAL PHARMACY W/ HCPCS (ALT 636 FOR OP/ED): Performed by: INTERNAL MEDICINE

## 2025-01-13 PROCEDURE — 3430000001 HC RX 343 DIAGNOSTIC RADIOPHARMACEUTICALS: Performed by: INTERNAL MEDICINE

## 2025-01-13 RX ORDER — HEPARIN 100 UNIT/ML
500 SYRINGE INTRAVENOUS AS NEEDED
OUTPATIENT
Start: 2025-01-13

## 2025-01-13 RX ORDER — FLUDEOXYGLUCOSE F 18 200 MCI/ML
12.9 INJECTION, SOLUTION INTRAVENOUS
Status: COMPLETED | OUTPATIENT
Start: 2025-01-13 | End: 2025-01-13

## 2025-01-13 RX ORDER — HEPARIN SODIUM,PORCINE/PF 10 UNIT/ML
50 SYRINGE (ML) INTRAVENOUS AS NEEDED
OUTPATIENT
Start: 2025-01-13

## 2025-01-13 RX ORDER — HEPARIN SODIUM,PORCINE/PF 10 UNIT/ML
50 SYRINGE (ML) INTRAVENOUS AS NEEDED
Status: DISCONTINUED | OUTPATIENT
Start: 2025-01-13 | End: 2025-01-13 | Stop reason: HOSPADM

## 2025-01-13 RX ORDER — HEPARIN 100 UNIT/ML
500 SYRINGE INTRAVENOUS AS NEEDED
Status: DISCONTINUED | OUTPATIENT
Start: 2025-01-13 | End: 2025-01-13 | Stop reason: HOSPADM

## 2025-01-13 RX ADMIN — FLUDEOXYGLUCOSE F 18 12.9 MILLICURIE: 200 INJECTION, SOLUTION INTRAVENOUS at 10:35

## 2025-01-13 RX ADMIN — HEPARIN 500 UNITS: 100 SYRINGE at 12:30

## 2025-01-13 ASSESSMENT — PAIN SCALES - GENERAL: PAINLEVEL_OUTOF10: 0-NO PAIN

## 2025-01-17 ENCOUNTER — OFFICE VISIT (OUTPATIENT)
Dept: HEMATOLOGY/ONCOLOGY | Facility: CLINIC | Age: 75
End: 2025-01-17
Payer: MEDICARE

## 2025-01-17 VITALS
RESPIRATION RATE: 17 BRPM | BODY MASS INDEX: 32.55 KG/M2 | TEMPERATURE: 96.6 F | SYSTOLIC BLOOD PRESSURE: 181 MMHG | WEIGHT: 185.08 LBS | DIASTOLIC BLOOD PRESSURE: 79 MMHG | HEART RATE: 72 BPM | OXYGEN SATURATION: 97 %

## 2025-01-17 DIAGNOSIS — C78.5 SECONDARY MALIGNANT NEOPLASM OF LARGE INTESTINE AND RECTUM (MULTI): ICD-10-CM

## 2025-01-17 DIAGNOSIS — C83.30 DIFFUSE LARGE B-CELL LYMPHOMA, UNSPECIFIED BODY REGION (MULTI): ICD-10-CM

## 2025-01-17 PROCEDURE — 99215 OFFICE O/P EST HI 40 MIN: CPT | Performed by: INTERNAL MEDICINE

## 2025-01-17 PROCEDURE — 3078F DIAST BP <80 MM HG: CPT | Performed by: INTERNAL MEDICINE

## 2025-01-17 PROCEDURE — 3077F SYST BP >= 140 MM HG: CPT | Performed by: INTERNAL MEDICINE

## 2025-01-17 PROCEDURE — 1159F MED LIST DOCD IN RCRD: CPT | Performed by: INTERNAL MEDICINE

## 2025-01-17 PROCEDURE — 1125F AMNT PAIN NOTED PAIN PRSNT: CPT | Performed by: INTERNAL MEDICINE

## 2025-01-17 SDOH — ECONOMIC STABILITY: FOOD INSECURITY: WITHIN THE PAST 12 MONTHS, YOU WORRIED THAT YOUR FOOD WOULD RUN OUT BEFORE YOU GOT THE MONEY TO BUY MORE.: NEVER TRUE

## 2025-01-17 SDOH — ECONOMIC STABILITY: FOOD INSECURITY: WITHIN THE PAST 12 MONTHS, THE FOOD YOU BOUGHT JUST DIDN'T LAST AND YOU DIDN'T HAVE MONEY TO GET MORE.: NEVER TRUE

## 2025-01-17 ASSESSMENT — ENCOUNTER SYMPTOMS
CARDIOVASCULAR NEGATIVE: 1
GASTROINTESTINAL NEGATIVE: 1
CONSTITUTIONAL NEGATIVE: 1
RESPIRATORY NEGATIVE: 1

## 2025-01-17 ASSESSMENT — PAIN SCALES - GENERAL: PAINLEVEL_OUTOF10: 1

## 2025-01-17 NOTE — PROGRESS NOTES
Patient ID: Kellie Rae is a 74 y.o. female.  Referring Physician: Contreras Rowell MD  [id number] [place] [address]  Primary Care Provider: Douglas Yañez MD  Visit Type:  Follow Up    Subjective    HPI  Referred by primary care doctor because of the results of CT chest abdomen and pelvis showing a soft tissue mass lesion within the mesentery with multiple retroperitoneal and mesenteric lymph and adenopathy.  Radiographically the findings could represent lymphoma.  I was able to elicit from had that his symptoms began about 5 years ago with pain in the abdomen and the back she subsequently was diagnosed with gout and it is not clear the etiology of the of the gout currently on allopurinol and colchicine.  She is also on nonsteroidals anti-inflammatory agents as well.  She was referred to medical oncology because a CT scan of the abdomen showed intra-abdominal lesions reminiscent of lymphadenopathy/Lymphoma.  Biopsy has been ordered to confirm the diagnosis.     FINAL DIAGNOSIS      A. SOFT TISSUE MASS, MESENTERY, BIOPSY:    DIFFUSE LARGE B CELL LYMPHOMA, FINAL CLASSIFICATION PENDING GENETIC STUDIES,    SUBTYPE BY PALMA CRITERIA:  NON GERMINAL CENTER CELL TYPE   MYC/BCL-2 EXPRESSION: NOT A DOUBLE EXPRESSOR (MYC-,BCL-2+)      Completed 6 cycle R CHOP : 3/7/24-6/20/24 Q 21 D cycle: PET scan negative for evidence of residual hypermetabolic disease see below.     Review of Systems   Constitutional: Negative.    HENT:  Negative.     Eyes: Negative.    Respiratory: Negative.     Cardiovascular: Negative.       IMPRESSION:  1. No evidence of hypermetabolic lymphadenopathy or metastatic disease throughout the body (Deauville 1). 2. Diffuse FDG uptake within the bone marrow, which is nonspecific, however likely related to patient's ongoing chemotherapy. No focal  osseous FDG uptake to suggest osseous metastatic disease.      Signed by: Jonathan Garcia 7/2/2024  Review of Systems    Constitutional: Negative.    HENT:  Negative.     Respiratory: Negative.     Cardiovascular: Negative.    Gastrointestinal: Negative.         Objective   BSA: There is no height or weight on file to calculate BSA.  There were no vitals taken for this visit.     has a past medical history of GERD (gastroesophageal reflux disease), Gout, Hyperlipidemia, Hypertension, Hypothyroidism, and Sleep apnea.   has a past surgical history that includes Appendectomy; Colonoscopy; and Cataract extraction (Bilateral).  No family history on file.  Oncology History   Lymphoma   2/23/2024 Initial Diagnosis    Lymphoma (CMS/HCC)     3/7/2024 -  Chemotherapy    R-CHOP (Cyclophosphamide / DOXOrubicin / VinCRIStine / PredniSONE) + RiTUXimab, 21 Day Cycles         Kellie Rae  reports that she quit smoking about 26 years ago. Her smoking use included cigarettes. She started smoking about 57 years ago. She has a 31 pack-year smoking history. She has never used smokeless tobacco.  She  reports no history of alcohol use.  She  reports no history of drug use.    Physical Exam  Constitutional:       Appearance: Normal appearance.   HENT:      Head: Normocephalic and atraumatic.   Eyes:      Extraocular Movements: Extraocular movements intact.      Pupils: Pupils are equal, round, and reactive to light.   Neurological:      Mental Status: She is alert.         WBC   Date/Time Value Ref Range Status   06/20/2024 08:51 AM 11.9 (H) 4.4 - 11.3 x10*3/uL Final   05/30/2024 08:23 AM 12.6 (H) 4.4 - 11.3 x10*3/uL Final   05/09/2024 09:10 AM 10.8 4.4 - 11.3 x10*3/uL Final     nRBC   Date Value Ref Range Status   05/09/2024   Final     Comment:     Not Measured   04/25/2024   Final     Comment:     Not Measured   03/14/2024 0.0 0.0 - 0.0 /100 WBCs Final     RBC   Date Value Ref Range Status   06/20/2024 3.13 (L) 4.00 - 5.20 x10*6/uL Final   05/30/2024 3.28 (L) 4.00 - 5.20 x10*6/uL Final   05/09/2024 3.38 (L) 4.00 - 5.20 x10*6/uL Final      Hemoglobin   Date Value Ref Range Status   06/20/2024 9.4 (L) 12.0 - 16.0 g/dL Final   05/30/2024 9.5 (L) 12.0 - 16.0 g/dL Final   05/09/2024 9.6 (L) 12.0 - 16.0 g/dL Final     Hematocrit   Date Value Ref Range Status   06/20/2024 30.8 (L) 36.0 - 46.0 % Final   05/30/2024 31.1 (L) 36.0 - 46.0 % Final   05/09/2024 31.2 (L) 36.0 - 46.0 % Final     MCV   Date/Time Value Ref Range Status   06/20/2024 08:51 AM 98 80 - 100 fL Final   05/30/2024 08:23 AM 95 80 - 100 fL Final   05/09/2024 09:10 AM 92 80 - 100 fL Final     MCH   Date/Time Value Ref Range Status   06/20/2024 08:51 AM 30.0 26.0 - 34.0 pg Final   05/30/2024 08:23 AM 29.0 26.0 - 34.0 pg Final   05/09/2024 09:10 AM 28.4 26.0 - 34.0 pg Final     MCHC   Date/Time Value Ref Range Status   06/20/2024 08:51 AM 30.5 (L) 32.0 - 36.0 g/dL Final   05/30/2024 08:23 AM 30.5 (L) 32.0 - 36.0 g/dL Final   05/09/2024 09:10 AM 30.8 (L) 32.0 - 36.0 g/dL Final     RDW   Date/Time Value Ref Range Status   06/20/2024 08:51 AM 17.5 (H) 11.5 - 14.5 % Final   05/30/2024 08:23 AM 19.8 (H) 11.5 - 14.5 % Final   05/09/2024 09:10 AM 20.3 (H) 11.5 - 14.5 % Final     Platelets   Date/Time Value Ref Range Status   06/20/2024 08:51  150 - 450 x10*3/uL Final   05/30/2024 08:23  150 - 450 x10*3/uL Final   05/09/2024 09:10  150 - 450 x10*3/uL Final     Comment:     Platelet count verified by smear review.     MPV   Date/Time Value Ref Range Status   09/24/2019 09:55 AM 10.2 7.0 - 12.6 CU Final   09/10/2019 10:30 AM 10.6 7.0 - 12.6 CU Final   08/27/2019 09:53 AM 10.4 7.0 - 12.6 CU Final     Neutrophils %   Date/Time Value Ref Range Status   06/20/2024 08:51 AM 91.8 40.0 - 80.0 % Final   05/30/2024 08:23 AM 91.1 40.0 - 80.0 % Final   05/09/2024 09:10 AM 90.0 40.0 - 80.0 % Final     Immature Granulocytes %, Automated   Date/Time Value Ref Range Status   06/20/2024 08:51 AM 0.3 0.0 - 0.9 % Final     Comment:     Immature Granulocyte Count (IG) includes promyelocytes, myelocytes  and metamyelocytes but does not include bands. Percent differential counts (%) should be interpreted in the context of the absolute cell counts (cells/UL).   05/30/2024 08:23 AM 0.6 0.0 - 0.9 % Final     Comment:     Immature Granulocyte Count (IG) includes promyelocytes, myelocytes and metamyelocytes but does not include bands. Percent differential counts (%) should be interpreted in the context of the absolute cell counts (cells/UL).   05/09/2024 09:10 AM 0.5 0.0 - 0.9 % Final     Comment:     Immature Granulocyte Count (IG) includes promyelocytes, myelocytes and metamyelocytes but does not include bands. Percent differential counts (%) should be interpreted in the context of the absolute cell counts (cells/UL).     Lymphocytes %, Manual   Date/Time Value Ref Range Status   03/14/2024 12:16 PM 90.0 13.0 - 44.0 % Final     Lymphocytes %   Date/Time Value Ref Range Status   06/20/2024 08:51 AM 4.5 13.0 - 44.0 % Final   05/30/2024 08:23 AM 4.5 13.0 - 44.0 % Final   05/09/2024 09:10 AM 6.1 13.0 - 44.0 % Final     Monocytes %, Manual   Date/Time Value Ref Range Status   03/14/2024 12:16 PM 8.0 2.0 - 10.0 % Final     Monocytes %   Date/Time Value Ref Range Status   06/20/2024 08:51 AM 3.1 2.0 - 10.0 % Final   05/30/2024 08:23 AM 3.5 2.0 - 10.0 % Final   05/09/2024 09:10 AM 3.0 2.0 - 10.0 % Final     Eosinophils %, Manual   Date/Time Value Ref Range Status   03/14/2024 12:16 PM 0.0 0.0 - 6.0 % Final     Eosinophils %   Date/Time Value Ref Range Status   06/20/2024 08:51 AM 0.1 0.0 - 6.0 % Final   05/30/2024 08:23 AM 0.1 0.0 - 6.0 % Final   05/09/2024 09:10 AM 0.3 0.0 - 6.0 % Final     Basophils %, Manual   Date/Time Value Ref Range Status   03/14/2024 12:16 PM 2.0 0.0 - 2.0 % Final     Basophils %   Date/Time Value Ref Range Status   06/20/2024 08:51 AM 0.2 0.0 - 2.0 % Final   05/30/2024 08:23 AM 0.2 0.0 - 2.0 % Final   05/09/2024 09:10 AM 0.1 0.0 - 2.0 % Final     Neutrophils Absolute   Date/Time Value Ref Range Status    06/20/2024 08:51 AM 10.93 (H) 1.60 - 5.50 x10*3/uL Final     Comment:     Percent differential counts (%) should be interpreted in the context of the absolute cell counts (cells/uL).   05/30/2024 08:23 AM 11.44 (H) 1.60 - 5.50 x10*3/uL Final     Comment:     Percent differential counts (%) should be interpreted in the context of the absolute cell counts (cells/uL).   05/09/2024 09:10 AM 9.73 (H) 1.60 - 5.50 x10*3/uL Final     Comment:     Percent differential counts (%) should be interpreted in the context of the absolute cell counts (cells/uL).     Immature Granulocytes Absolute, Automated   Date/Time Value Ref Range Status   06/20/2024 08:51 AM 0.03 0.00 - 0.50 x10*3/uL Final   05/30/2024 08:23 AM 0.08 0.00 - 0.50 x10*3/uL Final   05/09/2024 09:10 AM 0.05 0.00 - 0.50 x10*3/uL Final     Lymphocytes Absolute   Date/Time Value Ref Range Status   06/20/2024 08:51 AM 0.53 (L) 0.80 - 3.00 x10*3/uL Final   05/30/2024 08:23 AM 0.57 (L) 0.80 - 3.00 x10*3/uL Final   05/09/2024 09:10 AM 0.66 (L) 0.80 - 3.00 x10*3/uL Final     Monocytes Absolute   Date/Time Value Ref Range Status   06/20/2024 08:51 AM 0.37 0.05 - 0.80 x10*3/uL Final   05/30/2024 08:23 AM 0.44 0.05 - 0.80 x10*3/uL Final   05/09/2024 09:10 AM 0.32 0.05 - 0.80 x10*3/uL Final     Eosinophils Absolute   Date/Time Value Ref Range Status   06/20/2024 08:51 AM 0.01 0.00 - 0.40 x10*3/uL Final   05/30/2024 08:23 AM 0.01 0.00 - 0.40 x10*3/uL Final   05/09/2024 09:10 AM 0.03 0.00 - 0.40 x10*3/uL Final     Eosinophils Absolute, Manual   Date/Time Value Ref Range Status   03/14/2024 12:16 PM 0.00 0.00 - 0.40 x10*3/uL Final     Basophils Absolute   Date/Time Value Ref Range Status   06/20/2024 08:51 AM 0.02 0.00 - 0.10 x10*3/uL Final   05/30/2024 08:23 AM 0.02 0.00 - 0.10 x10*3/uL Final   05/09/2024 09:10 AM 0.01 0.00 - 0.10 x10*3/uL Final     Comment:     Automated WBC differential has been confirmed by manual smear.     Basophils Absolute, Manual   Date/Time Value Ref  Range Status   03/14/2024 12:16 PM 0.01 0.00 - 0.10 x10*3/uL Final       Assessment/Plan      7/9/25: PET negative and RTC 6 months: Repeat PET in 6 months.     1/17/2025: PET date 1/13/25: MUSCULOSKELETAL:  No focal hypermetabolic lesion is seen in the axial or appendicular  to suggest osseous metastasis.     IMPRESSION:  1. No evidence of hypermetabolic lymphadenopathy or metastatic  disease (Deauville score 1).     Signed by: Armen Hunter 1/13/2025  Plan is to repeat CT CAP in 6 months: RTC after Radiology: CBC CMP in 6 months.     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

## 2025-01-17 NOTE — PATIENT INSTRUCTIONS
Today you met with your hematologist/oncologist.  Recent scans were discussed and questions answered.  Scheduling orders were placed.  While we appreciate that you verbalized understanding, if any questions arise after leaving, please do not hesitate to call the office to discuss.  [phone] Peter Rowell will see you in 6  months with a CT scan prior. Port flushes have been ordered though that appointment.

## 2025-02-28 ENCOUNTER — INFUSION (OUTPATIENT)
Dept: HEMATOLOGY/ONCOLOGY | Facility: CLINIC | Age: 75
End: 2025-02-28
Payer: MEDICARE

## 2025-02-28 DIAGNOSIS — C78.5 SECONDARY MALIGNANT NEOPLASM OF LARGE INTESTINE AND RECTUM (MULTI): ICD-10-CM

## 2025-02-28 DIAGNOSIS — C85.10 B-CELL LYMPHOMA, UNSPECIFIED B-CELL LYMPHOMA TYPE, UNSPECIFIED BODY REGION (MULTI): ICD-10-CM

## 2025-02-28 DIAGNOSIS — C83.30 DIFFUSE LARGE B-CELL LYMPHOMA, UNSPECIFIED BODY REGION (MULTI): ICD-10-CM

## 2025-02-28 PROCEDURE — 96523 IRRIG DRUG DELIVERY DEVICE: CPT

## 2025-02-28 PROCEDURE — 2500000004 HC RX 250 GENERAL PHARMACY W/ HCPCS (ALT 636 FOR OP/ED): Performed by: INTERNAL MEDICINE

## 2025-02-28 RX ORDER — HEPARIN SODIUM,PORCINE/PF 10 UNIT/ML
50 SYRINGE (ML) INTRAVENOUS AS NEEDED
Status: DISCONTINUED | OUTPATIENT
Start: 2025-02-28 | End: 2025-02-28 | Stop reason: HOSPADM

## 2025-02-28 RX ORDER — HEPARIN 100 UNIT/ML
500 SYRINGE INTRAVENOUS AS NEEDED
Status: DISCONTINUED | OUTPATIENT
Start: 2025-02-28 | End: 2025-02-28 | Stop reason: HOSPADM

## 2025-02-28 RX ORDER — HEPARIN 100 UNIT/ML
500 SYRINGE INTRAVENOUS AS NEEDED
OUTPATIENT
Start: 2025-02-28

## 2025-02-28 RX ORDER — HEPARIN SODIUM,PORCINE/PF 10 UNIT/ML
50 SYRINGE (ML) INTRAVENOUS AS NEEDED
OUTPATIENT
Start: 2025-02-28

## 2025-02-28 RX ADMIN — HEPARIN 500 UNITS: 100 SYRINGE at 10:23

## 2025-04-11 ENCOUNTER — INFUSION (OUTPATIENT)
Dept: HEMATOLOGY/ONCOLOGY | Facility: CLINIC | Age: 75
End: 2025-04-11
Payer: MEDICARE

## 2025-04-11 DIAGNOSIS — C83.30 DIFFUSE LARGE B-CELL LYMPHOMA, UNSPECIFIED BODY REGION (MULTI): ICD-10-CM

## 2025-04-11 DIAGNOSIS — C85.10 B-CELL LYMPHOMA, UNSPECIFIED B-CELL LYMPHOMA TYPE, UNSPECIFIED BODY REGION (MULTI): ICD-10-CM

## 2025-04-11 DIAGNOSIS — C78.5 SECONDARY MALIGNANT NEOPLASM OF LARGE INTESTINE AND RECTUM (MULTI): ICD-10-CM

## 2025-04-11 PROCEDURE — 96523 IRRIG DRUG DELIVERY DEVICE: CPT

## 2025-04-11 PROCEDURE — 2500000004 HC RX 250 GENERAL PHARMACY W/ HCPCS (ALT 636 FOR OP/ED): Performed by: INTERNAL MEDICINE

## 2025-04-11 RX ORDER — HEPARIN 100 UNIT/ML
500 SYRINGE INTRAVENOUS AS NEEDED
Status: DISCONTINUED | OUTPATIENT
Start: 2025-04-11 | End: 2025-04-11 | Stop reason: HOSPADM

## 2025-04-11 RX ORDER — HEPARIN 100 UNIT/ML
500 SYRINGE INTRAVENOUS AS NEEDED
OUTPATIENT
Start: 2025-04-11

## 2025-04-11 RX ORDER — HEPARIN SODIUM,PORCINE/PF 10 UNIT/ML
50 SYRINGE (ML) INTRAVENOUS AS NEEDED
OUTPATIENT
Start: 2025-04-11

## 2025-04-11 RX ADMIN — HEPARIN 500 UNITS: 100 SYRINGE at 11:24

## 2025-04-11 NOTE — PROGRESS NOTES
Patient here for port flush, tolerated procedure well, discharged to home with follow up appointments in place. Patient left ambulatory clinic in stable condition.

## 2025-05-01 ENCOUNTER — ANCILLARY PROCEDURE (OUTPATIENT)
Dept: VASCULAR MEDICINE | Facility: CLINIC | Age: 75
End: 2025-05-01
Payer: MEDICARE

## 2025-05-01 DIAGNOSIS — R60.0 EDEMA OF LEFT UPPER ARM: ICD-10-CM

## 2025-05-01 DIAGNOSIS — M79.89 OTHER SPECIFIED SOFT TISSUE DISORDERS: ICD-10-CM

## 2025-05-01 PROCEDURE — 93971 EXTREMITY STUDY: CPT | Performed by: INTERNAL MEDICINE

## 2025-05-01 PROCEDURE — 93971 EXTREMITY STUDY: CPT

## 2025-05-18 ENCOUNTER — APPOINTMENT (OUTPATIENT)
Dept: RADIOLOGY | Facility: HOSPITAL | Age: 75
End: 2025-05-18
Payer: MEDICARE

## 2025-05-18 ENCOUNTER — HOSPITAL ENCOUNTER (OUTPATIENT)
Facility: HOSPITAL | Age: 75
Setting detail: OBSERVATION
End: 2025-05-18
Attending: EMERGENCY MEDICINE | Admitting: INTERNAL MEDICINE
Payer: MEDICARE

## 2025-05-18 ENCOUNTER — APPOINTMENT (OUTPATIENT)
Dept: CARDIOLOGY | Facility: HOSPITAL | Age: 75
End: 2025-05-18
Payer: MEDICARE

## 2025-05-18 VITALS
HEART RATE: 87 BPM | RESPIRATION RATE: 21 BRPM | BODY MASS INDEX: 32.1 KG/M2 | TEMPERATURE: 98.1 F | HEIGHT: 64 IN | SYSTOLIC BLOOD PRESSURE: 130 MMHG | WEIGHT: 188 LBS | DIASTOLIC BLOOD PRESSURE: 82 MMHG | OXYGEN SATURATION: 96 %

## 2025-05-18 DIAGNOSIS — J18.9 COMMUNITY ACQUIRED PNEUMONIA, UNSPECIFIED LATERALITY: ICD-10-CM

## 2025-05-18 DIAGNOSIS — R07.9 ACUTE CHEST PAIN: Primary | ICD-10-CM

## 2025-05-18 DIAGNOSIS — I25.10 ATHEROSCLEROSIS OF NATIVE CORONARY ARTERY WITHOUT ANGINA PECTORIS, UNSPECIFIED WHETHER NATIVE OR TRANSPLANTED HEART: ICD-10-CM

## 2025-05-18 LAB
ALBUMIN SERPL BCP-MCNC: 3.9 G/DL (ref 3.4–5)
ALP SERPL-CCNC: 101 U/L (ref 33–136)
ALT SERPL W P-5'-P-CCNC: 13 U/L (ref 7–45)
ANION GAP SERPL CALCULATED.3IONS-SCNC: 12 MMOL/L (ref 10–20)
AST SERPL W P-5'-P-CCNC: 13 U/L (ref 9–39)
BASOPHILS # BLD AUTO: 0.02 X10*3/UL (ref 0–0.1)
BASOPHILS NFR BLD AUTO: 0.2 %
BILIRUB SERPL-MCNC: 0.8 MG/DL (ref 0–1.2)
BNP SERPL-MCNC: 107 PG/ML (ref 0–99)
BUN SERPL-MCNC: 19 MG/DL (ref 6–23)
CALCIUM SERPL-MCNC: 8.9 MG/DL (ref 8.6–10.3)
CARDIAC TROPONIN I PNL SERPL HS: 19 NG/L (ref 0–13)
CARDIAC TROPONIN I PNL SERPL HS: 20 NG/L (ref 0–13)
CARDIAC TROPONIN I PNL SERPL HS: 20 NG/L (ref 0–13)
CHLORIDE SERPL-SCNC: 104 MMOL/L (ref 98–107)
CO2 SERPL-SCNC: 26 MMOL/L (ref 21–32)
CREAT SERPL-MCNC: 0.85 MG/DL (ref 0.5–1.05)
D DIMER PPP FEU-MCNC: 2.83 MG/L FEU (ref 0.19–0.5)
EGFRCR SERPLBLD CKD-EPI 2021: 71 ML/MIN/1.73M*2
EOSINOPHIL # BLD AUTO: 0.1 X10*3/UL (ref 0–0.4)
EOSINOPHIL NFR BLD AUTO: 1.1 %
ERYTHROCYTE [DISTWIDTH] IN BLOOD BY AUTOMATED COUNT: 13.9 % (ref 11.5–14.5)
GLUCOSE SERPL-MCNC: 101 MG/DL (ref 74–99)
HCT VFR BLD AUTO: 44.6 % (ref 36–46)
HGB BLD-MCNC: 14.1 G/DL (ref 12–16)
IMM GRANULOCYTES # BLD AUTO: 0.03 X10*3/UL (ref 0–0.5)
IMM GRANULOCYTES NFR BLD AUTO: 0.3 % (ref 0–0.9)
LYMPHOCYTES # BLD AUTO: 1.09 X10*3/UL (ref 0.8–3)
LYMPHOCYTES NFR BLD AUTO: 12.5 %
MAGNESIUM SERPL-MCNC: 1.94 MG/DL (ref 1.6–2.4)
MCH RBC QN AUTO: 28.1 PG (ref 26–34)
MCHC RBC AUTO-ENTMCNC: 31.6 G/DL (ref 32–36)
MCV RBC AUTO: 89 FL (ref 80–100)
MONOCYTES # BLD AUTO: 0.82 X10*3/UL (ref 0.05–0.8)
MONOCYTES NFR BLD AUTO: 9.4 %
NEUTROPHILS # BLD AUTO: 6.64 X10*3/UL (ref 1.6–5.5)
NEUTROPHILS NFR BLD AUTO: 76.5 %
NRBC BLD-RTO: 0 /100 WBCS (ref 0–0)
PLATELET # BLD AUTO: 176 X10*3/UL (ref 150–450)
POTASSIUM SERPL-SCNC: 4.6 MMOL/L (ref 3.5–5.3)
PROT SERPL-MCNC: 6.5 G/DL (ref 6.4–8.2)
RBC # BLD AUTO: 5.02 X10*6/UL (ref 4–5.2)
SODIUM SERPL-SCNC: 137 MMOL/L (ref 136–145)
WBC # BLD AUTO: 8.7 X10*3/UL (ref 4.4–11.3)

## 2025-05-18 PROCEDURE — 2500000004 HC RX 250 GENERAL PHARMACY W/ HCPCS (ALT 636 FOR OP/ED): Mod: JZ | Performed by: NURSE PRACTITIONER

## 2025-05-18 PROCEDURE — 71275 CT ANGIOGRAPHY CHEST: CPT

## 2025-05-18 PROCEDURE — 96366 THER/PROPH/DIAG IV INF ADDON: CPT

## 2025-05-18 PROCEDURE — G0378 HOSPITAL OBSERVATION PER HR: HCPCS

## 2025-05-18 PROCEDURE — 2500000002 HC RX 250 W HCPCS SELF ADMINISTERED DRUGS (ALT 637 FOR MEDICARE OP, ALT 636 FOR OP/ED): Performed by: EMERGENCY MEDICINE

## 2025-05-18 PROCEDURE — 94640 AIRWAY INHALATION TREATMENT: CPT

## 2025-05-18 PROCEDURE — 71275 CT ANGIOGRAPHY CHEST: CPT | Performed by: RADIOLOGY

## 2025-05-18 PROCEDURE — 99223 1ST HOSP IP/OBS HIGH 75: CPT | Performed by: INTERNAL MEDICINE

## 2025-05-18 PROCEDURE — 80150 ASSAY OF AMIKACIN: CPT | Mod: TRILAB | Performed by: NURSE PRACTITIONER

## 2025-05-18 PROCEDURE — 87040 BLOOD CULTURE FOR BACTERIA: CPT | Mod: TRILAB | Performed by: NURSE PRACTITIONER

## 2025-05-18 PROCEDURE — 85025 COMPLETE CBC W/AUTO DIFF WBC: CPT | Performed by: EMERGENCY MEDICINE

## 2025-05-18 PROCEDURE — 2500000001 HC RX 250 WO HCPCS SELF ADMINISTERED DRUGS (ALT 637 FOR MEDICARE OP): Performed by: EMERGENCY MEDICINE

## 2025-05-18 PROCEDURE — 71045 X-RAY EXAM CHEST 1 VIEW: CPT

## 2025-05-18 PROCEDURE — 2500000002 HC RX 250 W HCPCS SELF ADMINISTERED DRUGS (ALT 637 FOR MEDICARE OP, ALT 636 FOR OP/ED): Performed by: NURSE PRACTITIONER

## 2025-05-18 PROCEDURE — 99285 EMERGENCY DEPT VISIT HI MDM: CPT | Mod: 25 | Performed by: EMERGENCY MEDICINE

## 2025-05-18 PROCEDURE — 84484 ASSAY OF TROPONIN QUANT: CPT | Performed by: NURSE PRACTITIONER

## 2025-05-18 PROCEDURE — 2500000001 HC RX 250 WO HCPCS SELF ADMINISTERED DRUGS (ALT 637 FOR MEDICARE OP): Performed by: NURSE PRACTITIONER

## 2025-05-18 PROCEDURE — 96375 TX/PRO/DX INJ NEW DRUG ADDON: CPT | Mod: 59

## 2025-05-18 PROCEDURE — 96365 THER/PROPH/DIAG IV INF INIT: CPT | Mod: 59

## 2025-05-18 PROCEDURE — 2550000001 HC RX 255 CONTRASTS: Performed by: EMERGENCY MEDICINE

## 2025-05-18 PROCEDURE — 96361 HYDRATE IV INFUSION ADD-ON: CPT

## 2025-05-18 PROCEDURE — 96372 THER/PROPH/DIAG INJ SC/IM: CPT | Performed by: NURSE PRACTITIONER

## 2025-05-18 PROCEDURE — 80053 COMPREHEN METABOLIC PANEL: CPT | Performed by: EMERGENCY MEDICINE

## 2025-05-18 PROCEDURE — 83880 ASSAY OF NATRIURETIC PEPTIDE: CPT | Performed by: EMERGENCY MEDICINE

## 2025-05-18 PROCEDURE — 36415 COLL VENOUS BLD VENIPUNCTURE: CPT | Performed by: NURSE PRACTITIONER

## 2025-05-18 PROCEDURE — 85300 ANTITHROMBIN III ACTIVITY: CPT | Performed by: EMERGENCY MEDICINE

## 2025-05-18 PROCEDURE — 99223 1ST HOSP IP/OBS HIGH 75: CPT | Performed by: NURSE PRACTITIONER

## 2025-05-18 PROCEDURE — 84484 ASSAY OF TROPONIN QUANT: CPT | Performed by: EMERGENCY MEDICINE

## 2025-05-18 PROCEDURE — 2500000004 HC RX 250 GENERAL PHARMACY W/ HCPCS (ALT 636 FOR OP/ED): Mod: JZ | Performed by: EMERGENCY MEDICINE

## 2025-05-18 PROCEDURE — 93010 ELECTROCARDIOGRAM REPORT: CPT | Performed by: INTERNAL MEDICINE

## 2025-05-18 PROCEDURE — 83735 ASSAY OF MAGNESIUM: CPT | Performed by: EMERGENCY MEDICINE

## 2025-05-18 PROCEDURE — 71045 X-RAY EXAM CHEST 1 VIEW: CPT | Performed by: RADIOLOGY

## 2025-05-18 PROCEDURE — 2500000005 HC RX 250 GENERAL PHARMACY W/O HCPCS: Performed by: EMERGENCY MEDICINE

## 2025-05-18 PROCEDURE — 93005 ELECTROCARDIOGRAM TRACING: CPT

## 2025-05-18 PROCEDURE — 96367 TX/PROPH/DG ADDL SEQ IV INF: CPT

## 2025-05-18 RX ORDER — ACETAMINOPHEN 325 MG/1
650 TABLET ORAL EVERY 4 HOURS PRN
Status: DISCONTINUED | OUTPATIENT
Start: 2025-05-18 | End: 2025-05-19 | Stop reason: HOSPADM

## 2025-05-18 RX ORDER — NAPROXEN SODIUM 220 MG/1
324 TABLET, FILM COATED ORAL ONCE
Status: COMPLETED | OUTPATIENT
Start: 2025-05-18 | End: 2025-05-18

## 2025-05-18 RX ORDER — ATORVASTATIN CALCIUM 80 MG/1
80 TABLET, FILM COATED ORAL DAILY
Status: DISCONTINUED | OUTPATIENT
Start: 2025-05-18 | End: 2025-05-19 | Stop reason: HOSPADM

## 2025-05-18 RX ORDER — IPRATROPIUM BROMIDE AND ALBUTEROL SULFATE 2.5; .5 MG/3ML; MG/3ML
3 SOLUTION RESPIRATORY (INHALATION) EVERY 6 HOURS
Status: DISCONTINUED | OUTPATIENT
Start: 2025-05-18 | End: 2025-05-19 | Stop reason: HOSPADM

## 2025-05-18 RX ORDER — LISINOPRIL 5 MG/1
5 TABLET ORAL DAILY
Status: DISCONTINUED | OUTPATIENT
Start: 2025-05-18 | End: 2025-05-19 | Stop reason: HOSPADM

## 2025-05-18 RX ORDER — PSYLLIUM HUSK 0.4 G
2 CAPSULE ORAL DAILY
Status: DISCONTINUED | OUTPATIENT
Start: 2025-05-19 | End: 2025-05-19 | Stop reason: HOSPADM

## 2025-05-18 RX ORDER — ALBUTEROL SULFATE 0.83 MG/ML
3 SOLUTION RESPIRATORY (INHALATION) EVERY 4 HOURS PRN
Status: DISCONTINUED | OUTPATIENT
Start: 2025-05-18 | End: 2025-05-19 | Stop reason: HOSPADM

## 2025-05-18 RX ORDER — OXYCODONE AND ACETAMINOPHEN 5; 325 MG/1; MG/1
1 TABLET ORAL ONCE
Refills: 0 | Status: COMPLETED | OUTPATIENT
Start: 2025-05-18 | End: 2025-05-18

## 2025-05-18 RX ORDER — MORPHINE SULFATE 2 MG/ML
2 INJECTION, SOLUTION INTRAMUSCULAR; INTRAVENOUS EVERY 4 HOURS PRN
Status: DISCONTINUED | OUTPATIENT
Start: 2025-05-18 | End: 2025-05-19 | Stop reason: HOSPADM

## 2025-05-18 RX ORDER — NITROGLYCERIN 0.4 MG/1
0.4 TABLET SUBLINGUAL EVERY 5 MIN PRN
Status: DISCONTINUED | OUTPATIENT
Start: 2025-05-18 | End: 2025-05-19 | Stop reason: HOSPADM

## 2025-05-18 RX ORDER — KETOTIFEN FUMARATE 0.35 MG/ML
1 SOLUTION/ DROPS OPHTHALMIC
COMMUNITY

## 2025-05-18 RX ORDER — CEFTRIAXONE 2 G/50ML
2 INJECTION, SOLUTION INTRAVENOUS EVERY 24 HOURS
Status: DISCONTINUED | OUTPATIENT
Start: 2025-05-19 | End: 2025-05-19 | Stop reason: HOSPADM

## 2025-05-18 RX ORDER — ENOXAPARIN SODIUM 100 MG/ML
40 INJECTION SUBCUTANEOUS EVERY 24 HOURS
Status: DISCONTINUED | OUTPATIENT
Start: 2025-05-18 | End: 2025-05-19 | Stop reason: HOSPADM

## 2025-05-18 RX ORDER — ACETAMINOPHEN 160 MG/5ML
650 SOLUTION ORAL EVERY 4 HOURS PRN
Status: DISCONTINUED | OUTPATIENT
Start: 2025-05-18 | End: 2025-05-19 | Stop reason: HOSPADM

## 2025-05-18 RX ORDER — ONDANSETRON HYDROCHLORIDE 2 MG/ML
4 INJECTION, SOLUTION INTRAVENOUS ONCE
Status: COMPLETED | OUTPATIENT
Start: 2025-05-18 | End: 2025-05-18

## 2025-05-18 RX ORDER — NAPROXEN SODIUM 220 MG/1
81 TABLET, FILM COATED ORAL DAILY
Status: DISCONTINUED | OUTPATIENT
Start: 2025-05-19 | End: 2025-05-19 | Stop reason: HOSPADM

## 2025-05-18 RX ORDER — ACETAMINOPHEN 650 MG/1
650 SUPPOSITORY RECTAL EVERY 4 HOURS PRN
Status: DISCONTINUED | OUTPATIENT
Start: 2025-05-18 | End: 2025-05-19 | Stop reason: HOSPADM

## 2025-05-18 RX ORDER — CEFTRIAXONE 1 G/50ML
1 INJECTION, SOLUTION INTRAVENOUS ONCE
Status: COMPLETED | OUTPATIENT
Start: 2025-05-18 | End: 2025-05-18

## 2025-05-18 RX ORDER — LEVOTHYROXINE SODIUM 50 UG/1
50 TABLET ORAL
Status: DISCONTINUED | OUTPATIENT
Start: 2025-05-19 | End: 2025-05-19 | Stop reason: HOSPADM

## 2025-05-18 RX ORDER — ONDANSETRON 4 MG/1
4 TABLET, ORALLY DISINTEGRATING ORAL EVERY 8 HOURS PRN
Status: DISCONTINUED | OUTPATIENT
Start: 2025-05-18 | End: 2025-05-19 | Stop reason: HOSPADM

## 2025-05-18 RX ORDER — NITROGLYCERIN 20 MG/G
0.5 OINTMENT TOPICAL ONCE
Status: COMPLETED | OUTPATIENT
Start: 2025-05-18 | End: 2025-05-18

## 2025-05-18 RX ORDER — METOPROLOL SUCCINATE 50 MG/1
50 TABLET, EXTENDED RELEASE ORAL DAILY
Status: DISCONTINUED | OUTPATIENT
Start: 2025-05-18 | End: 2025-05-19 | Stop reason: HOSPADM

## 2025-05-18 RX ADMIN — ENOXAPARIN SODIUM 40 MG: 40 INJECTION SUBCUTANEOUS at 20:44

## 2025-05-18 RX ADMIN — IPRATROPIUM BROMIDE AND ALBUTEROL SULFATE 3 ML: 2.5; .5 SOLUTION RESPIRATORY (INHALATION) at 20:54

## 2025-05-18 RX ADMIN — METHYLPREDNISOLONE SODIUM SUCCINATE 125 MG: 125 INJECTION, POWDER, FOR SOLUTION INTRAMUSCULAR; INTRAVENOUS at 17:26

## 2025-05-18 RX ADMIN — ATORVASTATIN CALCIUM 80 MG: 80 TABLET, FILM COATED ORAL at 20:44

## 2025-05-18 RX ADMIN — ONDANSETRON 4 MG: 2 INJECTION, SOLUTION INTRAMUSCULAR; INTRAVENOUS at 14:54

## 2025-05-18 RX ADMIN — DEXTROSE MONOHYDRATE 500 MG: 50 INJECTION, SOLUTION INTRAVENOUS at 19:08

## 2025-05-18 RX ADMIN — IPRATROPIUM BROMIDE AND ALBUTEROL SULFATE 3 ML: 2.5; .5 SOLUTION RESPIRATORY (INHALATION) at 16:10

## 2025-05-18 RX ADMIN — CEFTRIAXONE 1 G: 1 INJECTION, SOLUTION INTRAVENOUS at 17:27

## 2025-05-18 RX ADMIN — OXYCODONE HYDROCHLORIDE AND ACETAMINOPHEN 1 TABLET: 5; 325 TABLET ORAL at 17:26

## 2025-05-18 RX ADMIN — ASPIRIN 324 MG: 81 TABLET, CHEWABLE ORAL at 16:10

## 2025-05-18 RX ADMIN — SODIUM CHLORIDE 1000 ML: 900 INJECTION, SOLUTION INTRAVENOUS at 14:54

## 2025-05-18 RX ADMIN — IOHEXOL 75 ML: 350 INJECTION, SOLUTION INTRAVENOUS at 15:57

## 2025-05-18 RX ADMIN — NITROGLYCERIN 0.5 INCH: 20 OINTMENT TOPICAL at 14:54

## 2025-05-18 SDOH — SOCIAL STABILITY: SOCIAL INSECURITY: HAS ANYONE EVER THREATENED TO HURT YOUR FAMILY OR YOUR PETS?: NO

## 2025-05-18 SDOH — SOCIAL STABILITY: SOCIAL INSECURITY: DO YOU FEEL UNSAFE GOING BACK TO THE PLACE WHERE YOU ARE LIVING?: NO

## 2025-05-18 SDOH — SOCIAL STABILITY: SOCIAL INSECURITY: WERE YOU ABLE TO COMPLETE ALL THE BEHAVIORAL HEALTH SCREENINGS?: YES

## 2025-05-18 SDOH — SOCIAL STABILITY: SOCIAL INSECURITY: WITHIN THE LAST YEAR, HAVE YOU BEEN AFRAID OF YOUR PARTNER OR EX-PARTNER?: NO

## 2025-05-18 SDOH — SOCIAL STABILITY: SOCIAL INSECURITY: WITHIN THE LAST YEAR, HAVE YOU BEEN HUMILIATED OR EMOTIONALLY ABUSED IN OTHER WAYS BY YOUR PARTNER OR EX-PARTNER?: NO

## 2025-05-18 SDOH — SOCIAL STABILITY: SOCIAL INSECURITY
WITHIN THE LAST YEAR, HAVE YOU BEEN RAPED OR FORCED TO HAVE ANY KIND OF SEXUAL ACTIVITY BY YOUR PARTNER OR EX-PARTNER?: NO

## 2025-05-18 SDOH — ECONOMIC STABILITY: FOOD INSECURITY: WITHIN THE PAST 12 MONTHS, YOU WORRIED THAT YOUR FOOD WOULD RUN OUT BEFORE YOU GOT THE MONEY TO BUY MORE.: NEVER TRUE

## 2025-05-18 SDOH — SOCIAL STABILITY: SOCIAL INSECURITY: ARE THERE ANY APPARENT SIGNS OF INJURIES/BEHAVIORS THAT COULD BE RELATED TO ABUSE/NEGLECT?: NO

## 2025-05-18 SDOH — SOCIAL STABILITY: SOCIAL INSECURITY
WITHIN THE LAST YEAR, HAVE YOU BEEN KICKED, HIT, SLAPPED, OR OTHERWISE PHYSICALLY HURT BY YOUR PARTNER OR EX-PARTNER?: NO

## 2025-05-18 SDOH — ECONOMIC STABILITY: INCOME INSECURITY: IN THE PAST 12 MONTHS HAS THE ELECTRIC, GAS, OIL, OR WATER COMPANY THREATENED TO SHUT OFF SERVICES IN YOUR HOME?: NO

## 2025-05-18 SDOH — SOCIAL STABILITY: SOCIAL INSECURITY: ARE YOU OR HAVE YOU BEEN THREATENED OR ABUSED PHYSICALLY, EMOTIONALLY, OR SEXUALLY BY ANYONE?: NO

## 2025-05-18 SDOH — SOCIAL STABILITY: SOCIAL INSECURITY: DO YOU FEEL ANYONE HAS EXPLOITED OR TAKEN ADVANTAGE OF YOU FINANCIALLY OR OF YOUR PERSONAL PROPERTY?: NO

## 2025-05-18 SDOH — SOCIAL STABILITY: SOCIAL INSECURITY: HAVE YOU HAD THOUGHTS OF HARMING ANYONE ELSE?: NO

## 2025-05-18 SDOH — ECONOMIC STABILITY: FOOD INSECURITY: WITHIN THE PAST 12 MONTHS, THE FOOD YOU BOUGHT JUST DIDN'T LAST AND YOU DIDN'T HAVE MONEY TO GET MORE.: NEVER TRUE

## 2025-05-18 SDOH — SOCIAL STABILITY: SOCIAL INSECURITY: DOES ANYONE TRY TO KEEP YOU FROM HAVING/CONTACTING OTHER FRIENDS OR DOING THINGS OUTSIDE YOUR HOME?: NO

## 2025-05-18 SDOH — SOCIAL STABILITY: SOCIAL INSECURITY: ABUSE: ADULT

## 2025-05-18 SDOH — SOCIAL STABILITY: SOCIAL INSECURITY: HAVE YOU HAD ANY THOUGHTS OF HARMING ANYONE ELSE?: NO

## 2025-05-18 ASSESSMENT — LIFESTYLE VARIABLES
AUDIT-C TOTAL SCORE: 0
AUDIT-C TOTAL SCORE: 0
HOW OFTEN DO YOU HAVE A DRINK CONTAINING ALCOHOL: NEVER
HOW MANY STANDARD DRINKS CONTAINING ALCOHOL DO YOU HAVE ON A TYPICAL DAY: PATIENT DOES NOT DRINK
HOW OFTEN DO YOU HAVE 6 OR MORE DRINKS ON ONE OCCASION: NEVER
SKIP TO QUESTIONS 9-10: 1

## 2025-05-18 ASSESSMENT — COGNITIVE AND FUNCTIONAL STATUS - GENERAL
MOBILITY SCORE: 24
PATIENT BASELINE BEDBOUND: NO
DAILY ACTIVITIY SCORE: 24
MOBILITY SCORE: 24
DAILY ACTIVITIY SCORE: 24

## 2025-05-18 ASSESSMENT — ACTIVITIES OF DAILY LIVING (ADL)
PATIENT'S MEMORY ADEQUATE TO SAFELY COMPLETE DAILY ACTIVITIES?: YES
FEEDING YOURSELF: INDEPENDENT
LACK_OF_TRANSPORTATION: NO
BATHING: INDEPENDENT
ADEQUATE_TO_COMPLETE_ADL: YES
GROOMING: INDEPENDENT
DRESSING YOURSELF: INDEPENDENT
WALKS IN HOME: INDEPENDENT
HEARING - LEFT EAR: FUNCTIONAL
TOILETING: INDEPENDENT
JUDGMENT_ADEQUATE_SAFELY_COMPLETE_DAILY_ACTIVITIES: YES
HEARING - RIGHT EAR: FUNCTIONAL

## 2025-05-18 ASSESSMENT — PAIN - FUNCTIONAL ASSESSMENT
PAIN_FUNCTIONAL_ASSESSMENT: 0-10
PAIN_FUNCTIONAL_ASSESSMENT: 0-10

## 2025-05-18 ASSESSMENT — PAIN SCALES - GENERAL
PAINLEVEL_OUTOF10: 4
PAINLEVEL_OUTOF10: 7

## 2025-05-18 ASSESSMENT — PAIN DESCRIPTION - ORIENTATION: ORIENTATION: MID

## 2025-05-18 ASSESSMENT — COLUMBIA-SUICIDE SEVERITY RATING SCALE - C-SSRS
6. HAVE YOU EVER DONE ANYTHING, STARTED TO DO ANYTHING, OR PREPARED TO DO ANYTHING TO END YOUR LIFE?: NO
2. HAVE YOU ACTUALLY HAD ANY THOUGHTS OF KILLING YOURSELF?: NO
1. IN THE PAST MONTH, HAVE YOU WISHED YOU WERE DEAD OR WISHED YOU COULD GO TO SLEEP AND NOT WAKE UP?: NO

## 2025-05-18 ASSESSMENT — PATIENT HEALTH QUESTIONNAIRE - PHQ9
SUM OF ALL RESPONSES TO PHQ9 QUESTIONS 1 & 2: 0
1. LITTLE INTEREST OR PLEASURE IN DOING THINGS: NOT AT ALL
2. FEELING DOWN, DEPRESSED OR HOPELESS: NOT AT ALL

## 2025-05-18 ASSESSMENT — PAIN DESCRIPTION - LOCATION: LOCATION: CHEST

## 2025-05-18 NOTE — CARE PLAN
Problem: Deep Vein Thrombosis  Goal: I will remain free from complications of deep vein thrombosis and maintain current level of mobility  Outcome: Progressing     Problem: Fall/Injury  Goal: Not fall by end of shift  Outcome: Progressing  Goal: Be free from injury by end of the shift  Outcome: Progressing  Goal: Verbalize understanding of personal risk factors for fall in the hospital  Outcome: Progressing  Goal: Verbalize understanding of risk factor reduction measures to prevent injury from fall in the home  Outcome: Progressing  Goal: Use assistive devices by end of the shift  Outcome: Progressing  Goal: Pace activities to prevent fatigue by end of the shift  Outcome: Progressing     Problem: Pain  Goal: Takes deep breaths with improved pain control throughout the shift  Outcome: Progressing  Goal: Turns in bed with improved pain control throughout the shift  Outcome: Progressing  Goal: Walks with improved pain control throughout the shift  Outcome: Progressing  Goal: Performs ADL's with improved pain control throughout shift  Outcome: Progressing  Goal: Participates in PT with improved pain control throughout the shift  Outcome: Progressing  Goal: Free from opioid side effects throughout the shift  Outcome: Progressing  Goal: Free from acute confusion related to pain meds throughout the shift  Outcome: Progressing     Problem: Pain - Adult  Goal: Verbalizes/displays adequate comfort level or baseline comfort level  Outcome: Progressing     Problem: Safety - Adult  Goal: Free from fall injury  Outcome: Progressing     Problem: Discharge Planning  Goal: Discharge to home or other facility with appropriate resources  Outcome: Progressing     Problem: Chronic Conditions and Co-morbidities  Goal: Patient's chronic conditions and co-morbidity symptoms are monitored and maintained or improved  Outcome: Progressing     Problem: Nutrition  Goal: Nutrient intake appropriate for maintaining nutritional needs  Outcome:  Progressing   The patient's goals for the shift include      The clinical goals for the shift include pain control, monitor labs and vs, maintain safety    Over the shift, the patient did not make progress toward the following goals. Barriers to progression include pain. Recommendations to address these barriers include pain control.

## 2025-05-18 NOTE — H&P
History Of Present Illness  Kellie Rae is a 75 y.o. female presenting with chest pain.  She has a history of B-cell lymphoma with mets, hypothyroid, hyperlipidemia, and hypertension.  Patient states that over the last 2 weeks she has had chest pain that radiates to the arm and neck.  She states she came to the ER today because it was worsening.  Upon arrival to the emergency room she was given 325 mg of aspirin along with Solu-Medrol nitroglycerin and oxycodone.  CTA of the chest was negative for PE but did show groundglass infiltrates and atelectasis, chest x-ray showed pulmonary vascular congestion.  She was given azithromycin and ceftriaxone in the emergency room.  Currently, she states that her chest pain has resolved.  She denies shortness of breath or she denies headache, dizziness, nausea/vomiting.     Past Medical History  Medical History[1]    Surgical History  Surgical History[2]     Social History  She reports that she quit smoking about 26 years ago. Her smoking use included cigarettes. She started smoking about 57 years ago. She has a 31 pack-year smoking history. She has never used smokeless tobacco. She reports that she does not drink alcohol and does not use drugs.    Family History  Family History[3]     Allergies  Patient has no known allergies.    Review of Systems  All systems reviewed and negative except as noted in HPI  Physical Exam  Constitutional: No acute distress, calm, cooperative  HEENT: PERRL, normocephalic, atraumatic, mucous membranes moist  Cardiovascular: Regular rhythm and rate,   Respiratory: There are few scattered rales  Gastrointestinal: Bowel sounds positive x 4, soft, nontender  Neurologic: Alert and oriented x 3 equal strength bilaterally  Musculoskeletal: Able to move all extremities, no edema  Skin: Warm, dry and intact  Last Recorded Vitals  Blood pressure (!) 151/93, pulse 81, temperature 36.4 °C (97.6 °F), temperature source Temporal, resp. rate 20, height 1.626  "m (5' 4\"), weight 85.3 kg (188 lb), SpO2 95%.   Assessment & Plan  Chest pain  Rule out NSTEMI  Troponins were flat at 20 and 20 respectively, third troponin pending  EKG showed normal sinus rhythm  Consult cardiology  Community-acquired pneumonia  Azithromycin/Rocephin  Urine culture and blood cultures pending  Sputum culture if able  Urine for Legionella and Streptococcus  Currently on room air (oxygen as needed for saturation greater than 92%)  B-cell lymphoma with large intestine/rectal malignant neoplasm  Follows with  from hematology oncology  Receiving infusion therapy at [facility]  Hypothyroidism  On levothyroxine  Hyperlipidemia  Changed home pravastatin to high-dose atorvastatin  Hypertension  On lisinopril and metoprolol  DVT prophylaxis  Lovenox  CODE STATUS  DNR/DNI        I spent 75 minutes in the professional and overall care of this patient.      JEN Vela  Patient was seen and examined.  Discussed with JEN Vela.  Reviewed his assessment and plan.       [1]   Past Medical History:  Diagnosis Date    GERD (gastroesophageal reflux disease)     Gout     Hyperlipidemia     Hypertension     Hypothyroidism     Sleep apnea    [2]   Past Surgical History:  Procedure Laterality Date    APPENDECTOMY      CATARACT EXTRACTION Bilateral     COLONOSCOPY     [3] No family history on file.    "

## 2025-05-18 NOTE — ED PROVIDER NOTES
Emergency Department Provider Note        MEDICAL DECISION MAKING:  Medical Decision Making       5/18/25     Chief Complaint   Patient presents with    Chest Pain     Several weeks, worse since last night, SOB this AM. Pain radiates to left arm      History/Exam limitations: none.   Additional history was obtained from patient and spouse/SO.    HPI: Kellie Rae  is a 75 y.o. presents with chest pain and shortness of breath.  Chest pain described as midsternal heaviness radiating to left arm ongoing for 2 days.  Shortness of breath over the past day, getting worse with exertion.  No edema.  No cough.  No fevers or chills.  Symptoms been constant.  No other modifying factors.  Denies nausea, vomiting, diarrhea abdominal pain.  Past medical records, Past medical history and surgical history reviewed and as documented.  History reviewed and as noted. past medical records reviewed.    Active Ambulatory Problems     Diagnosis Date Noted    Lymphoma 02/23/2024    Atherosclerotic heart disease of native coronary artery without angina pectoris 07/20/2017    Athscl heart disease of native coronary artery w/o ang pctrs 07/20/2017    Encounter for screening for cardiovascular disorders 07/20/2017    Encounter for screening for lipoid disorders 08/04/2022    Other osteoporosis without current pathological fracture 12/23/2022    Cigarette nicotine dependence in remission 03/05/2024    Dyspnea 03/05/2024    Essential hypertension 03/05/2024    Hyperlipidemia 03/05/2024    Hypothyroidism 03/05/2024    Idiopathic chronic gout of multiple sites with tophus 01/03/2019    Lower abdominal pain 03/05/2024    Mass of adrenal gland (Multi) 03/05/2024    Mildly obese 03/05/2024    Morbidly obese (Multi) 03/05/2024    BENNIE (obstructive sleep apnea) 03/05/2024    Chronic bronchitis (Multi) 03/05/2024     Resolved Ambulatory Problems     Diagnosis Date Noted    No Resolved Ambulatory Problems     Past Medical History:   Diagnosis  "Date    GERD (gastroesophageal reflux disease)     Gout     Hypertension     Sleep apnea         Surgical History[1]     Family History[2]     Social History[3]     RX Allergies[4]     Unless otherwise stated in this report the patient's positive and negative responses for review of systems for constitutional, eyes, ENT, cardiovascular, respiratory, gastrointestinal, neurological, genitourinary, musculoskeletal, and integument systems and related systems to the presenting problem are either as stated in the HPI or were not pertinent or were negative for the symptoms and/or complaints related to the presenting medical problem.    PHYSICAL EXAM  Triage/nursing notes and vital signs reviewed as available and as noted    Vitals:    05/18/25 1432 05/18/25 1436   BP:  (!) 154/104   Pulse:  80   Resp: 18 18   Temp: 36.4 °C (97.6 °F)    TempSrc: Temporal    SpO2:  96%   Weight: 85.3 kg (188 lb)    Height: 1.626 m (5' 4\")            Constitutional:       Appearance: Patient not ill-appearing or toxic-appearing.   HENT:      Head: Atraumatic.      Mouth/Throat:      Mouth: Mucous membranes are moist.      Pharynx: Oropharynx is clear. No pharyngeal swelling.      Neck: No Obvious JVD. Trachea midline. No neck swelling.  Eyes:      Normal Ocular tracking  Cardiovascular:      Rate and Rhythm: Normal rate and regular rhythm.      Pulses: Normal pulses.      Heart sounds: No murmur heard.  Pulmonary:      Effort: Pulmonary effort is normal.      Breath sounds: Normal breath sounds.   Abdominal:      General: Bowel sounds are normal.      Palpations: Abdomen is soft.      Tenderness: There is no abdominal tenderness. There is no guarding or rebound.   Musculoskeletal:      Cervical back: Neck supple.      Right lower leg: No tenderness. No edema.      Left lower leg: No tenderness. No edema.   Skin:     General: Skin is warm and dry.      Capillary Refill: Capillary refill takes less than 2 seconds.   Neurological:      General: " No focal deficit present.      Mental Status: Patient is alert.  Appropriate conversant     Sensory: Gross Sensation is intact.      Motor: Gross Motor function is intact symmetrically  Psychiatric:         Mood and Affect: Mood normal.      Cooperative, no apparent risk to self or others    ED COURSE        Work-up performed to evaluate for differential diagnosis as clinically indicated   Orders Placed This Encounter   Procedures    XR chest 1 view    CT angio chest for pulmonary embolism    CBC and Auto Differential    Magnesium    Comprehensive metabolic panel    Troponin I Series, High Sensitivity (0, 1 HR)    B-Type Natriuretic Peptide    D-dimer, quantitative    Troponin I, High Sensitivity, Initial    Troponin, High Sensitivity, 1 Hour    Adult diet Regular    Blood pressure, bilateral    Vital Signs    Notify provider (specify parameters)    Pain Assessment    Weight on admission    Height on admission    No Isolation Required    Activity (specify) Activity With Exceptions; Bathroom Privileges (With Assistance)    Vital Signs    ECG 12 lead    Electrocardiogram, 12-lead PRN ACS symptoms    Insert and maintain peripheral IV    Initiate observation status    ED to floor bed request          Labs and imaging reviewed by me  and note         Labs Reviewed   CBC WITH AUTO DIFFERENTIAL - Abnormal       Result Value    WBC 8.7      nRBC 0.0      RBC 5.02      Hemoglobin 14.1      Hematocrit 44.6      MCV 89      MCH 28.1      MCHC 31.6 (*)     RDW 13.9      Platelets 176      Neutrophils % 76.5      Immature Granulocytes %, Automated 0.3      Lymphocytes % 12.5      Monocytes % 9.4      Eosinophils % 1.1      Basophils % 0.2      Neutrophils Absolute 6.64 (*)     Immature Granulocytes Absolute, Automated 0.03      Lymphocytes Absolute 1.09      Monocytes Absolute 0.82 (*)     Eosinophils Absolute 0.10      Basophils Absolute 0.02     COMPREHENSIVE METABOLIC PANEL - Abnormal    Glucose 101 (*)     Sodium 137       Potassium 4.6      Chloride 104      Bicarbonate 26      Anion Gap 12      Urea Nitrogen 19      Creatinine 0.85      eGFR 71      Calcium 8.9      Albumin 3.9      Alkaline Phosphatase 101      Total Protein 6.5      AST 13      Bilirubin, Total 0.8      ALT 13     B-TYPE NATRIURETIC PEPTIDE - Abnormal     (*)     Narrative:        <100 pg/mL - Heart failure unlikely  100-299 pg/mL - Intermediate probability of acute heart                  failure exacerbation. Correlate with clinical                  context and patient history.    >=300 pg/mL - Heart Failure likely. Correlate with clinical                  context and patient history.    BNP testing is performed using different testing methodology at [facility] than at other [facility] [place] hospitals. Direct result comparisons should only be made within the same method.      D-DIMER, NON VTE - Abnormal    D-Dimer Non VTE, Quant (mg/L FEU) 2.83 (*)     Narrative:     THROMBOEMBOLIC EVENTS CANNOT BE EXCLUDED SOLELY ON THE BASIS OF THE D-DIMER LEVEL BEING WITHIN THE NORMAL REFERENCE RANGE. D-DIMER LEVELS LESS THAN 0.5 MG/L FEU IN CONJUNCTION WITH A LOW CLINICAL PROBABILITY HAVE AN EXCELLENT NEGATIVE PREDICTIVE VALUE IN EXCLUDING A DIAGNOSIS OF PULMONARY EMBOLUS (PE) OR DEEP VEIN THROMBOSIS (DVT). ELEVATED D-DIMER LEVELS ARE NOT SPECIFIC TO PE OR DVT, AND MAY BE SEEN IN PATIENTS WITH DIC, ADVANCED AGE, PREGNANCY, MALIGNANCY, LIVER DISEASE, INFECTION, AND INFLAMMATORY CONDITIONS AMONG OTHERS. D-DIMER LEVELS MAY BE DECREASED IN PATIENTS RECEIVING ANTI-COAGULATION THERAPY.   SERIAL TROPONIN-INITIAL - Abnormal    Troponin I, High Sensitivity 20 (*)     Narrative:     Less than 99th percentile of normal range cutoff-  Female and children under 18 years old <14 ng/L; Male <21 ng/L: Negative  Repeat testing should be performed if clinically indicated.     Female and children under 18 years old 14-50 ng/L; Male 21-50 ng/L:  Consistent with possible cardiac damage  and possible increased clinical   risk. Serial measurements may help to assess extent of myocardial damage.     >50 ng/L: Consistent with cardiac damage, increased clinical risk and  myocardial infarction. Serial measurements may help assess extent of   myocardial damage.      NOTE: Children less than 1 year old may have higher baseline troponin   levels and results should be interpreted in conjunction with the overall   clinical context.     NOTE: Troponin I testing is performed using a different   testing methodology at [facility] than at other   [facility]. Direct result comparisons should only   be made within the same method.   SERIAL TROPONIN, 1 HOUR - Abnormal    Troponin I, High Sensitivity 20 (*)     Narrative:     Less than 99th percentile of normal range cutoff-  Female and children under 18 years old <14 ng/L; Male <21 ng/L: Negative  Repeat testing should be performed if clinically indicated.     Female and children under 18 years old 14-50 ng/L; Male 21-50 ng/L:  Consistent with possible cardiac damage and possible increased clinical   risk. Serial measurements may help to assess extent of myocardial damage.     >50 ng/L: Consistent with cardiac damage, increased clinical risk and  myocardial infarction. Serial measurements may help assess extent of   myocardial damage.      NOTE: Children less than 1 year old may have higher baseline troponin   levels and results should be interpreted in conjunction with the overall   clinical context.     NOTE: Troponin I testing is performed using a different   testing methodology at [facility] than at other   [facility]. Direct result comparisons should only   be made within the same method.   MAGNESIUM - Normal    Magnesium 1.94     TROPONIN SERIES- (INITIAL, 1 HR)    Narrative:     The following orders were created for panel order Troponin I Series, High Sensitivity (0, 1 HR).  Procedure                                Abnormality         Status                     ---------                               -----------         ------                     Troponin I, High Sensiti...[[id number]]  Abnormal            Final result               Troponin, High Sensitivi...[[id number]]  Abnormal            Final result                 Please view results for these tests on the individual orders.        CT angio chest for pulmonary embolism   Final Result   1. No evidence of acute pulmonary embolism.   2. Diffuse heterogeneous ground-glass infiltrate throughout the lung   fields bilaterally which may represent air trapping, however,   atypical pneumonia/pneumonitis may have similar appearance. Recommend   clinical correlation and follow-up to document resolution.   3. Mild patchy atelectasis or infiltrate within the lingula and right   middle lobe. Attention in subsequent follow-up studies recommended.   4. Additional findings as above.             MACRO:   None        Signed by: Susan Bagley 5/18/2025 4:57 PM   Dictation workstation:   [id number]      XR chest 1 view   Final Result   Emphysematous changes in the lungs. Pulmonary vascular congestion.             MACRO:   None        Signed by: Jose Fuentes 5/18/2025 4:11 PM   Dictation workstation:   [id number]               Pt course which Intervention and treatment included :    Procedure  Procedures    Medications   ipratropium-albuteroL (Duo-Neb) 0.5-2.5 mg/3 mL nebulizer solution 3 mL (3 mL nebulization Given 5/18/25 1610)   azithromycin (Zithromax) 500 mg in dextrose 5%  mL (has no administration in time range)   cefTRIAXone (Rocephin) 1 g in dextrose (iso) IV 50 mL (has no administration in time range)   oxyCODONE-acetaminophen (Percocet) 5-325 mg per tablet 1 tablet (has no administration in time range)   methylPREDNISolone sod succinate (SOLU-Medrol) injection 125 mg (has no administration in time range)   sodium chloride 0.9 % bolus 1,000 mL (0 mL intravenous Stopped  5/18/25 1702)   ondansetron (Zofran) injection 4 mg (4 mg intravenous Given 5/18/25 1454)   nitroglycerin (Yariel-Bid) 2 % ointment 0.5 inch (0.5 inches transdermal Given 5/18/25 1454)   iohexol (OMNIPaque) 350 mg iodine/mL solution 75 mL (75 mL intravenous Given 5/18/25 1557)   aspirin chewable tablet 324 mg (324 mg oral Given 5/18/25 1610)        ED Course as of 05/18/25 1719   Sun May 18, 2025   1527 Twelve-lead EKG notes normal sinus rhythm 82 beats minute.  Normal intervals.  No ST elevations or depressions. [ML]   1528 Troponin I, High Sensitivity(!): 20 [ML]   1718 Patient noted to have elevated D-dimer CT angio of the chest obtain showed evidence of pneumonia, azithromycin and Rocephin given this may be contributing factors shortness of breath.  She does have a history of COPD, DuoNeb was administered in addition to Solu-Medrol.  Pain is improved with nitroglycerin and aspirin administered.  Troponin repeat is equivalent. [ML]      ED Course User Index  [ML] Marty R Lejeune, DO         Diagnoses as of 05/18/25 1719   Acute chest pain   Community acquired pneumonia, unspecified laterality          DISPOSITION: Admit to hospital.  All imaging and laboratory results were discussed with the patient in detail including acute as well as incidental findings.  I discussed the differential, results and treatment plan with the patient and/or family/friend/caregiver if present.  I discussed the reason and need for admission to the hospital as well as risk and benefits.  Patient/family/caregiver/friend is in agreement with transfer as well as choice of facility to be transferred to.  Education and reassurance provided regards to presumed diagnosis was given.  Patient/family/caregiver understand and all questions answered.      1. Acute chest pain        2. Community acquired pneumonia, unspecified laterality           -------------------------------------------------------------------    05/18/25 at 2:44 PM - Jeronimo MONTAGUE  LeJeune, DO   Internal & Emergency Medicine            [1]   Past Surgical History:  Procedure Laterality Date    APPENDECTOMY      CATARACT EXTRACTION Bilateral     COLONOSCOPY     [2] No family history on file.  [3]   Social History  Tobacco Use    Smoking status: Former     Current packs/day: 0.00     Average packs/day: 1 pack/day for 31.0 years (31.0 ttl pk-yrs)     Types: Cigarettes     Start date:      Quit date:      Years since quittin.3    Smokeless tobacco: Never   Vaping Use    Vaping status: Never Used   Substance Use Topics    Alcohol use: Never    Drug use: Never   [4] No Known Allergies       Marty R Lejeune, DO  Resident  [phone]

## 2025-05-18 NOTE — ASSESSMENT & PLAN NOTE
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

## 2025-05-19 ENCOUNTER — PHARMACY VISIT (OUTPATIENT)
Dept: PHARMACY | Facility: CLINIC | Age: 75
End: 2025-05-19
Payer: COMMERCIAL

## 2025-05-19 VITALS
RESPIRATION RATE: 18 BRPM | DIASTOLIC BLOOD PRESSURE: 85 MMHG | OXYGEN SATURATION: 97 % | HEIGHT: 64 IN | SYSTOLIC BLOOD PRESSURE: 139 MMHG | HEART RATE: 83 BPM | TEMPERATURE: 98.1 F | BODY MASS INDEX: 32.1 KG/M2 | WEIGHT: 188 LBS

## 2025-05-19 LAB
AMIKACIN TROUGH SERPL-MCNC: <2.5 UG/ML (ref 1–8)
ANION GAP SERPL CALCULATED.3IONS-SCNC: 15 MMOL/L (ref 10–20)
BUN SERPL-MCNC: 20 MG/DL (ref 6–23)
CALCIUM SERPL-MCNC: 8.9 MG/DL (ref 8.6–10.3)
CHLORIDE SERPL-SCNC: 106 MMOL/L (ref 98–107)
CO2 SERPL-SCNC: 22 MMOL/L (ref 21–32)
CREAT SERPL-MCNC: 0.72 MG/DL (ref 0.5–1.05)
EGFRCR SERPLBLD CKD-EPI 2021: 86 ML/MIN/1.73M*2
ERYTHROCYTE [DISTWIDTH] IN BLOOD BY AUTOMATED COUNT: 13.7 % (ref 11.5–14.5)
GLUCOSE SERPL-MCNC: 153 MG/DL (ref 74–99)
HCT VFR BLD AUTO: 40.2 % (ref 36–46)
HGB BLD-MCNC: 12.9 G/DL (ref 12–16)
MCH RBC QN AUTO: 28.3 PG (ref 26–34)
MCHC RBC AUTO-ENTMCNC: 32.1 G/DL (ref 32–36)
MCV RBC AUTO: 88 FL (ref 80–100)
NRBC BLD-RTO: 0 /100 WBCS (ref 0–0)
PLATELET # BLD AUTO: 158 X10*3/UL (ref 150–450)
POTASSIUM SERPL-SCNC: 4.4 MMOL/L (ref 3.5–5.3)
RBC # BLD AUTO: 4.56 X10*6/UL (ref 4–5.2)
SODIUM SERPL-SCNC: 139 MMOL/L (ref 136–145)
WBC # BLD AUTO: 5.6 X10*3/UL (ref 4.4–11.3)

## 2025-05-19 PROCEDURE — 97165 OT EVAL LOW COMPLEX 30 MIN: CPT | Mod: GO

## 2025-05-19 PROCEDURE — 82374 ASSAY BLOOD CARBON DIOXIDE: CPT | Performed by: NURSE PRACTITIONER

## 2025-05-19 PROCEDURE — 85027 COMPLETE CBC AUTOMATED: CPT | Performed by: NURSE PRACTITIONER

## 2025-05-19 PROCEDURE — 2500000001 HC RX 250 WO HCPCS SELF ADMINISTERED DRUGS (ALT 637 FOR MEDICARE OP): Performed by: NURSE PRACTITIONER

## 2025-05-19 PROCEDURE — RXMED WILLOW AMBULATORY MEDICATION CHARGE

## 2025-05-19 PROCEDURE — 99222 1ST HOSP IP/OBS MODERATE 55: CPT | Performed by: INTERNAL MEDICINE

## 2025-05-19 PROCEDURE — 2500000002 HC RX 250 W HCPCS SELF ADMINISTERED DRUGS (ALT 637 FOR MEDICARE OP, ALT 636 FOR OP/ED): Performed by: NURSE PRACTITIONER

## 2025-05-19 PROCEDURE — 97161 PT EVAL LOW COMPLEX 20 MIN: CPT | Mod: GP

## 2025-05-19 PROCEDURE — G0378 HOSPITAL OBSERVATION PER HR: HCPCS

## 2025-05-19 PROCEDURE — 99239 HOSP IP/OBS DSCHRG MGMT >30: CPT | Performed by: NURSE PRACTITIONER

## 2025-05-19 RX ORDER — TRAMADOL HYDROCHLORIDE 50 MG/1
50 TABLET, FILM COATED ORAL DAILY
COMMUNITY

## 2025-05-19 RX ORDER — ALBUTEROL SULFATE 90 UG/1
2 INHALANT RESPIRATORY (INHALATION) EVERY 4 HOURS PRN
Qty: 18 G | Refills: 1 | Status: SHIPPED | OUTPATIENT
Start: 2025-05-19

## 2025-05-19 RX ORDER — CEFUROXIME AXETIL 500 MG/1
500 TABLET ORAL 2 TIMES DAILY
Qty: 12 TABLET | Refills: 0 | Status: SHIPPED | OUTPATIENT
Start: 2025-05-19

## 2025-05-19 RX ORDER — NAPROXEN SODIUM 220 MG/1
81 TABLET, FILM COATED ORAL DAILY
Start: 2025-05-20

## 2025-05-19 RX ORDER — AZITHROMYCIN 250 MG/1
TABLET, FILM COATED ORAL
Qty: 4 TABLET | Refills: 0 | Status: SHIPPED | OUTPATIENT
Start: 2025-05-19 | End: 2025-05-24

## 2025-05-19 RX ADMIN — Medication 2 TABLET: at 08:40

## 2025-05-19 RX ADMIN — LEVOTHYROXINE SODIUM 50 MCG: 0.05 TABLET ORAL at 05:58

## 2025-05-19 RX ADMIN — ASPIRIN 81 MG: 81 TABLET, CHEWABLE ORAL at 08:40

## 2025-05-19 RX ADMIN — LISINOPRIL 5 MG: 5 TABLET ORAL at 08:40

## 2025-05-19 RX ADMIN — METOPROLOL SUCCINATE 50 MG: 50 TABLET, EXTENDED RELEASE ORAL at 08:40

## 2025-05-19 ASSESSMENT — COGNITIVE AND FUNCTIONAL STATUS - GENERAL
CLIMB 3 TO 5 STEPS WITH RAILING: A LITTLE
DAILY ACTIVITIY SCORE: 24
MOBILITY SCORE: 23
DAILY ACTIVITIY SCORE: 24
MOBILITY SCORE: 24

## 2025-05-19 ASSESSMENT — PAIN SCALES - GENERAL
PAINLEVEL_OUTOF10: 0 - NO PAIN

## 2025-05-19 ASSESSMENT — ACTIVITIES OF DAILY LIVING (ADL)
ADL_ASSISTANCE: INDEPENDENT
ADL_ASSISTANCE: INDEPENDENT
BATHING_ASSISTANCE: NOT PERFORMED

## 2025-05-19 ASSESSMENT — ENCOUNTER SYMPTOMS
NEAR-SYNCOPE: 0
PND: 0
WEAKNESS: 0
ORTHOPNEA: 0
FEVER: 0
CLAUDICATION: 0
PALPITATIONS: 0
COUGH: 0
DIAPHORESIS: 0
MYALGIAS: 0
WEIGHT LOSS: 0
SYNCOPE: 0
WHEEZING: 0
SHORTNESS OF BREATH: 0
WEIGHT GAIN: 0
DIZZINESS: 0
DYSPNEA ON EXERTION: 0
IRREGULAR HEARTBEAT: 0

## 2025-05-19 ASSESSMENT — PAIN - FUNCTIONAL ASSESSMENT
PAIN_FUNCTIONAL_ASSESSMENT: 0-10
PAIN_FUNCTIONAL_ASSESSMENT: 0-10

## 2025-05-19 NOTE — CONSULTS
Inpatient consult to Cardiology  Consult performed by: Sander Wheeler DO  Consult ordered by: MONIQUE Vela-CNP  Reason for consult: Chest pain        History Of Present Illness:    Kellie Rae is a 75 y.o. female presenting with left arm and upper chest pain.  She has no history of heart disease.  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  She presents with constant left arm/shoulder/upper chest pain she describes as a soreness.  Initially when it started it was 10/10.  She was given some steroids which helped for 2 days with recurrence of the pain.  The pain is worse with left arm movement particularly overhead.  She has no other associated symptoms.  She was started on Solu-Medrol and given oxycodone.  She is currently pain-free.  Her high-sensitivity troponins are 20, 20, and 19 in sequence.  Her EKG shows sinus rhythm without any concerning ST-T wave abnormalities.  Remainder of her laboratory analysis is unremarkable.  Her kidney function is normal and electrolytes are acceptable.  She has a CT chest that was negative for PE but does comment on diffuse groundglass opacities.  She is currently being treated for atypical pneumonia.    As far as her cardiac history is concerned it is relatively benign.  She has an echocardiogram from 1 year ago showing normal left ventricular size and function, stage I diastolic dysfunction, mild to moderate aortic insufficiency, mild tricuspid regurgitation.  She does not follow with a cardiologist on a regular basis.    Review of Systems   Constitutional: Negative for diaphoresis, fever, weight gain and weight loss.   Eyes:  Negative for visual disturbance.   Cardiovascular:  Negative for chest pain, claudication, dyspnea on exertion, irregular heartbeat, leg swelling, near-syncope, orthopnea, palpitations, paroxysmal nocturnal dyspnea and syncope.   Respiratory:  Negative for cough, shortness of breath and  wheezing.    Musculoskeletal:  Negative for muscle weakness and myalgias.   Neurological:  Negative for dizziness and weakness.   All other systems reviewed and are negative.         Last Recorded Vitals:  Vitals:    05/19/25 0002 05/19/25 0336 05/19/25 0758 05/19/25 0800   BP: 131/72 137/83 146/74    BP Location: Left arm Left arm Left arm    Patient Position: Lying Lying Lying    Pulse: 85 77 67    Resp: 20 20 18    Temp: 36.6 °C (97.9 °F) 36.7 °C (98.1 °F) 36.7 °C (98.1 °F)    TempSrc: Temporal Temporal Temporal    SpO2: 95% 97% 97% 97%   Weight:       Height:           Last Labs:  CBC - 5/19/2025:  4:21 AM  5.6 12.9 158    40.2      CMP - 5/19/2025:  4:21 AM  8.9 6.5 13 --- 0.8   _ 3.9 13 101      PTT - No results in last year.  _   _ _     Troponin I, High Sensitivity   Date/Time Value Ref Range Status   05/18/2025 08:55 PM 19 (H) 0 - 13 ng/L Final   05/18/2025 04:14 PM 20 (H) 0 - 13 ng/L Final   05/18/2025 02:48 PM 20 (H) 0 - 13 ng/L Final     BNP   Date/Time Value Ref Range Status   05/18/2025 02:48  (H) 0 - 99 pg/mL Final     Hemoglobin A1C   Date/Time Value Ref Range Status   09/24/2018 10:25 AM 6.2 (H) 4.0 - 6.0 % Final     Comment:     Hemoglobin A1C levels are related to mean blood glucose during the   preceding 2-3 months. The relationship table below may be used as a   general guide. Each 1% increase in HGB A1C is a reflection of an   increase in mean glucose of approximately 30 mg/dl.   Reference: Diabetes Care, volume 29, supplement 1 Jan. 2006                        HGB A1C ................. Approx. Mean Glucose   _______________________________________________   6%   ...............................  120 mg/dl   7%   ...............................  150 mg/dl   8%   ...............................  180 mg/dl   9%   ...............................  210 mg/dl   10%  ...............................  240 mg/dl  Performed at [facility] [id number] [address] [facility] [id number]       LDL Calculated    Date/Time Value Ref Range Status   08/04/2022 09:06 AM 51 (L) 65 - 130 MG/DL Final   04/13/2021 02:49 PM 54 (L) 65 - 130 MG/DL Final   09/24/2018 10:25 AM 80 65 - 130 MG/DL Final      Last I/O:  I/O last 3 completed shifts:  In: 1000 (11.7 mL/kg) [P.O.:200; IV Piggyback:800]  Out: 650 (7.6 mL/kg) [Urine:650 (0.2 mL/kg/hr)]  Weight: 85.3 kg     Past Cardiology Tests (Last 3 Years):  EKG:  ECG 12 lead 05/18/2025 (Preliminary)      ECG 12 lead     Echo:  Onco-Echo Complete (Strain & 3D) 05/02/2024      Onco-Echo Complete (Strain & 3D) 02/26/2024    Ejection Fractions:  EF   Date/Time Value Ref Range Status   02/26/2024 03:21 PM 61 %      Cath:  No results found for this or any previous visit from the past 1095 days.    Stress Test:  No results found for this or any previous visit from the past 1095 days.    Cardiac Imaging:  No results found for this or any previous visit from the past 1095 days.      Past Medical History:  She has a past medical history of GERD (gastroesophageal reflux disease), Gout, Hyperlipidemia, Hypertension, Hypothyroidism, and Sleep apnea.    Past Surgical History:  She has a past surgical history that includes Appendectomy; Colonoscopy; and Cataract extraction (Bilateral).      Social History:  She reports that she quit smoking about 26 years ago. Her smoking use included cigarettes. She started smoking about 57 years ago. She has a 31 pack-year smoking history. She has never used smokeless tobacco. She reports that she does not drink alcohol and does not use drugs.    Family History:  Family History[1]     Allergies:  Patient has no known allergies.    Inpatient Medications:  Scheduled Medications[2]  PRN Medications[3]  Continuous Medications[4]  Outpatient Medications:  Current Outpatient Medications   Medication Instructions    ketotifen (Zaditor) 0.025 % (0.035 %) ophthalmic solution 1 drop, Both Eyes, Every 8 to 12 hours as needed for itchy eyes    levothyroxine (SYNTHROID, LEVOXYL) 50  mcg, Daily before breakfast    lisinopril 10 mg, oral, Daily    metoprolol succinate XL (TOPROL-XL) 50 mg, Daily    ondansetron (ZOFRAN) 8 mg, oral, Every 8 hours PRN    pravastatin (PRAVACHOL) 80 mg, Daily    predniSONE (Deltasone) 50 mg tablet Take 100 mg by mouth on Days 1, 2, 3, 4, and 5 of treatment cycle.    prochlorperazine (COMPAZINE) 10 mg, oral, Every 6 hours PRN    traMADol (ULTRAM) 50 mg, oral, Daily       Physical Exam:   Gen: NAD   Neck: no JVD, carotid upstroke is brisk and without delay   Heart: rrr, s1s2+ no mrg   Lungs: CTA   Ext: warm no edema     Assessment/Plan   Atypical Chest Pain: I do not feel that her chest pain is cardiac in nature.  Her EKG shows sinus rhythm without ischemic changes and her high-sensitivity troponins are negligible with an insignificant trend.  Acute coronary syndrome has been ruled out.  She does have however significant risk factors for atherosclerosis.  It would be reasonable to have her follow-up with me in the office in 3 to 4 weeks for a full ASCVD risk assessment.  As for now no further cardiac workup or interventions recommended.  Atypical pneumonia: She is comfortable, saturating well on room air.  Antibiotic therapy per primary team.  Hypertension: Reasonable control, would continue the patient's home metoprolol and lisinopril.  Aortic insufficiency: Mild to moderate progressive stage B on echocardiogram March 2024.  It would be reasonable to repeat an echocardiogram in March 2026.  Peripheral IV 05/18/25 20 G Right Antecubital (Active)   Present on Admission to Healthcare Facility No 05/18/25 2200   Site Assessment Clean;Dry;Intact 05/18/25 2200   Dressing Status Clean;Dry;Occlusive 05/18/25 2200   Number of days: 1       Implantable Port 03/05/24 Right Chest Single lumen port (Active)   Line Necessity No, provider contacted 05/19/25 0700   Number of days: 440       Code Status:  DNR and No Intubation        Sander Wheeler DO         [1] No family history on  file.  [2]   Scheduled medications   Medication Dose Route Frequency    aspirin  81 mg oral Daily    atorvastatin  80 mg oral Daily    azithromycin  500 mg intravenous q24h    calcium carbonate-vitamin D3  2 tablet oral Daily    cefTRIAXone  2 g intravenous q24h    enoxaparin  40 mg subcutaneous q24h    ipratropium-albuteroL  3 mL nebulization q6h    levothyroxine  50 mcg oral Daily before breakfast    lisinopril  5 mg oral Daily    metoprolol succinate XL  50 mg oral Daily   [3]   PRN medications   Medication    acetaminophen    Or    acetaminophen    Or    acetaminophen    albuterol    HYDROmorphone    morphine    nitroglycerin    ondansetron ODT    oxygen   [4]   Continuous Medications   Medication Dose Last Rate

## 2025-05-19 NOTE — DISCHARGE INSTRUCTIONS
CXR in 3 weeks-> please follow up with PCP  Please follow up with cardiology  Take antibiotics as instructed

## 2025-05-19 NOTE — PROGRESS NOTES
Pharmacy Medication History Review    Kellie Rae is a 75 y.o. female admitted for Chest pain. Pharmacy reviewed the patient's [id number] medications and allergies for accuracy.    Medications ADDED:  Tramadol 50 mg  Medications CHANGED:  Lisinopril 5 mg, 2 tablets daily ---> lisinopril 10 mg, 1 tablet daily  Ketotifen 0.025% eye drop, 1 drop if needed ---> 1 drop into both eyes every 8 to 12 hours as needed for itchy eyes  Medications REMOVED:   Calcium carbonate-vitamin D3 500 mg-5mcg  Dicyclomine 20 mg       The list below reflects the updated PTA list.   Prior to Admission Medications   Prescriptions Last Dose Informant Patient Reported? Taking?   ketotifen (Zaditor) 0.025 % (0.035 %) ophthalmic solution Unknown  Yes Yes   Sig: Administer 1 drop into both eyes. Every 8 to 12 hours as needed for itchy eyes   levothyroxine (Synthroid, Levoxyl) 50 mcg tablet 5/18/2025  Yes Yes   Sig: Take 1 tablet (50 mcg) by mouth once daily in the morning. Take before meals.   lisinopril 10 mg tablet 5/18/2025  Yes Yes   Sig: Take 1 tablet (10 mg) by mouth once daily.   metoprolol succinate XL (Toprol-XL) 50 mg 24 hr tablet 5/18/2025  Yes Yes   Sig: Take 1 tablet (50 mg) by mouth once daily. Do not crush or chew.   ondansetron (Zofran) 8 mg tablet Not Taking  No No   Sig: Take 1 tablet (8 mg) by mouth every 8 hours if needed for nausea or vomiting.   Patient not taking: Reported on 5/18/2025   pravastatin (Pravachol) 80 mg tablet Unknown  Yes Yes   Sig: Take 1 tablet (80 mg) by mouth once daily.   predniSONE (Deltasone) 50 mg tablet Not Taking  No No   Sig: Take 100 mg by mouth on Days 1, 2, 3, 4, and 5 of treatment cycle.   Patient not taking: Reported on 5/18/2025   prochlorperazine (Compazine) 10 mg tablet Not Taking  No No   Sig: Take 1 tablet (10 mg) by mouth every 6 hours if needed for nausea or vomiting.   Patient not taking: Reported on 5/18/2025   traMADol (Ultram) 50 mg tablet Unknown  Yes Yes   Sig: Take  1 tablet (50 mg) by mouth once daily.      Facility-Administered Medications: None        The list below reflects the updated allergy list. Please review each documented allergy for additional clarification and justification.  Allergies  Reviewed by Gabriella Stallings, PharmD on 5/19/2025   No Known Allergies         Patient declines M2B at discharge.     Sources:   Pharmacy dispense history  Patient Interview Moderate historian  Chart Review     Additional Comments:  Medication dispense history showed record of levothyroxine 75 mcg but patient's own medication list brought in showed 50 mcg.      Gabriella Stallings, PharmLEI  05/19/25

## 2025-05-19 NOTE — PROGRESS NOTES
"Physical Therapy    Physical Therapy Evaluation    Patient Name: Kellie Rae  MRN: [id number]  Department: [address]  Room: [address] [place], [place] [address]  Today's Date: 5/19/2025   Time Calculation  Start Time: 0949  Stop Time: 1001  Time Calculation (min): 12 min    Assessment/Plan   PT Assessment  Barriers to Discharge Home: No anticipated barriers  Evaluation/Treatment Tolerance: Patient tolerated treatment well  Medical Staff Made Aware: Yes  Strengths: Premorbid level of function  Barriers to Participation: Comorbidities  End of Session Communication: Bedside nurse  Assessment Comment: pt demonstrated safe and independent functional mobility and amb without device; Pt refused us eof FWW for extended amb but does have rollator access at home. Pt is functioning at baseline. No skilled PT needs noted; PT educated pt in amb in halls with supervision of 1 staff to minotor vitals.  End of Session Patient Position: Up in chair, Alarm on (call button in reach)  IP OR SWING BED PT PLAN  Inpatient or Swing Bed: Inpatient  PT Plan  PT Plan: PT Eval only  PT Eval Only Reason: No acute PT needs identified  PT Discharge Recommendations: No further acute PT  Equipment Recommended upon Discharge:  (FWW PRN)  PT Recommended Transfer Status: Independent  PT - OK to Discharge: Yes    Subjective     PT Visit Info:  PT Received On: 05/19/25  General Visit Information:  General  Reason for Referral: impaired mobility; + PNA  Referred By: Dr. Escobar  Past Medical History Relevant to Rehab: B cell lymphoma, hypothyroidism, hyperlipidemia, HTN, GERD, gout, sleep apnea  Family/Caregiver Present: Yes  Caregiver Feedback: spouse present  Co-Treatment: OT  Co-Treatment Reason: pt refusing to wrk with OT/PT separately; \" wanting to get this done and over with.'  Prior to Session Communication: Bedside nurse  Patient Position Received: Bed, 2 rail up, Alarm off, not on at start of session  Preferred Learning Style: verbal, visual  General Comment: 76 yo " "WF admitted to observation at [facility] via ED 5/18/25 with c/o chest pain radiating to neck and arm. CXR revealed pulminary vascular congestion. CTA negative for PE. Cleared by nurse for therapy; pt agreeable to therapy.  Home Living:  Home Living  Type of Home: House  Lives With: Spouse  Home Adaptive Equipment: Walker rolling or standard, Other (Comment) (rollator)  Home Layout: One level, Stairs to alternate level with rails  Alternate Level Stairs-Number of Steps: stair lift to basement  Home Access: Ramped entrance  Bathroom Shower/Tub: Tub/shower unit  Bathroom Toilet: Standard  Bathroom Equipment: Shower chair with back  Bathroom Accessibility: main level  Prior Level of Function:  Prior Function Per Pt/Caregiver Report  Level of [place]: Independent with ADLs and functional transfers, Independent with homemaking with ambulation  Receives Help From: Family  ADL Assistance: Independent  Homemaking Assistance: Independent  Ambulatory Assistance: Independent (pt reports \" I use the rollator on my bad days.\")  Vocational: Retired  Prior Function Comments: 2 falls in 6 months, both mechanical.  does not drive; spouse provides transportation  Precautions:  Precautions  Hearing/Visual Limitations: + GLASSES  Medical Precautions: Fall precautions (low risk from PT standpoint)      Date/Time Vitals Session Patient Position Pulse Resp SpO2 BP MAP (mmHg)    05/19/25 0945 During OT  --  --  --  --  --  --     05/19/25 0949 During PT  --  --  --  --  --  --           Vital Signs Comment: O2 sat 97 to 98% with HR 81 to 95 bpm during session---room air     Objective   Pain:  Pain Assessment  Pain Assessment: 0-10  0-10 (Numeric) Pain Score: 0 - No pain  Cognition:  Cognition  Overall Cognitive Status: Within Functional Limits  Orientation Level: Oriented X4  Following Commands: Follows all commands and directions without difficulty    General Assessments:  General Observation  General Observation: pleasant, " cooperative, visible skin intact               Activity Tolerance  Endurance: Decreased tolerance for upright activites  Activity Tolerance Comments: fair +/good - with mild SOB    Sensation  Light Touch: No apparent deficits    Strength  Strength Comments: alf LE's 4-/5  Coordination  Movements are Fluid and Coordinated: Yes    Postural Control  Posture Comment: obese with mild forward head, protracted shldrs    Static Sitting Balance  Static Sitting-Balance Support: Feet supported  Static Sitting-Level of Assistance: Independent  Static Sitting-Comment/Number of Minutes: good  Dynamic Sitting Balance  Dynamic Sitting-Balance Support: Feet supported  Dynamic Sitting-Level of Assistance: Independent  Dynamic Sitting-Comments: good    Static Standing Balance  Static Standing-Balance Support: No upper extremity supported  Static Standing-Level of Assistance: Distant supervision  Static Standing-Comment/Number of Minutes: good  Dynamic Standing Balance  Dynamic Standing-Balance Support: No upper extremity supported  Dynamic Standing-Level of Assistance: Distant supervision  Dynamic Standing-Comments: good -  Functional Assessments:  Bed Mobility  Bed Mobility: Yes  Bed Mobility 1  Bed Mobility 1: Supine to sitting  Level of Assistance 1: Independent  Bed Mobility Comments 1: head of bed flat    Transfers  Transfer: Yes  Transfer 1  Technique 1: Sit to stand, Stand to sit  Transfer Level of Assistance 1: Independent  Trials/Comments 1: onto and off of bed, commode and armchair    Ambulation/Gait Training  Ambulation/Gait Training Performed: Yes  Ambulation/Gait Training 1  Surface 1: Level tile  Device 1: No device  Assistance 1: Distant supervision  Quality of Gait 1:  (2 episodes of mild scissoring)  Comments/Distance (ft) 1: 75 ft x 2, + tuns; Initially placing right hand on hobbs rail for balance but took hand off at request of PT. Two episode of mild scissoring after turning without loss of  balance.    Stairs  Stairs: No (no steps encountered at home)  Extremity/Trunk Assessments:  Cervical Spine   Cervical Spine:  (mild forward head)  RLE   RLE :  (see above strength comments)  LLE   LLE :  (see above strength comments)  Outcome Measures:  [facility] Basic Mobility  Turning from your back to your side while in a flat bed without using bedrails: None  Moving from lying on your back to sitting on the side of a flat bed without using bedrails: None  Moving to and from bed to chair (including a wheelchair): None  Standing up from a chair using your arms (e.g. wheelchair or bedside chair): None  To walk in hospital room: None  Climbing 3-5 steps with railing: A little  Basic Mobility - Total Score: 23    Encounter Problems          Education Documentation  Mobility Training, taught by Lucy Eddy PT at 5/19/2025 10:45 AM.  Learner: Patient  Readiness: Acceptance  Method: Explanation  Response: Verbalizes Understanding  Comment: PT educated pt in amb in halls with supervision of 1 staff    Education Comments  No comments found.

## 2025-05-19 NOTE — CARE PLAN
The patient's goals for the shift include      The clinical goals for the shift include IV antibiotics, safety, and rest      Problem: Deep Vein Thrombosis  Goal: I will remain free from complications of deep vein thrombosis and maintain current level of mobility  Outcome: Progressing     Problem: Fall/Injury  Goal: Not fall by end of shift  Outcome: Progressing  Goal: Be free from injury by end of the shift  Outcome: Progressing  Goal: Verbalize understanding of personal risk factors for fall in the hospital  Outcome: Progressing  Goal: Verbalize understanding of risk factor reduction measures to prevent injury from fall in the home  Outcome: Progressing  Goal: Use assistive devices by end of the shift  Outcome: Progressing  Goal: Pace activities to prevent fatigue by end of the shift  Outcome: Progressing     Problem: Pain  Goal: Takes deep breaths with improved pain control throughout the shift  Outcome: Progressing  Goal: Turns in bed with improved pain control throughout the shift  Outcome: Progressing  Goal: Walks with improved pain control throughout the shift  Outcome: Progressing  Goal: Performs ADL's with improved pain control throughout shift  Outcome: Progressing  Goal: Participates in PT with improved pain control throughout the shift  Outcome: Progressing  Goal: Free from opioid side effects throughout the shift  Outcome: Progressing  Goal: Free from acute confusion related to pain meds throughout the shift  Outcome: Progressing     Problem: Pain - Adult  Goal: Verbalizes/displays adequate comfort level or baseline comfort level  Outcome: Progressing     Problem: Safety - Adult  Goal: Free from fall injury  Outcome: Progressing     Problem: Discharge Planning  Goal: Discharge to home or other facility with appropriate resources  Outcome: Progressing     Problem: Chronic Conditions and Co-morbidities  Goal: Patient's chronic conditions and co-morbidity symptoms are monitored and maintained or  improved  Outcome: Progressing     Problem: Nutrition  Goal: Nutrient intake appropriate for maintaining nutritional needs  Outcome: Progressing     Problem: Respiratory  Goal: Minimize anxiety/maximize coping throughout shift  Outcome: Progressing  Goal: Minimal/no exertional discomfort or dyspnea this shift  Outcome: Progressing  Goal: No signs of respiratory distress (eg. Use of accessory muscles. Peds grunting)  Outcome: Progressing  Goal: Patent airway maintained this shift  Outcome: Progressing

## 2025-05-19 NOTE — PROGRESS NOTES
Occupational Therapy    Evaluation    Patient Name: Kellie Rae  MRN: [id number]  Department: [address]  Room: [address] [place] [address]  Today's Date: 5/19/2025  Time Calculation  Start Time: 0945  Stop Time: 1000  Time Calculation (min): 15 min    Assessment  IP OT Assessment  OT Assessment: 76 y/o female presenting with chest pain. Admitted for mgmt of PNA, cleared by cardiology. On eval she presents at her baseline, completing ADLs/mobility at IND level.  Denies any home going concerns/needs at this time.  Encouraged OOB as tolerated while in house.  OT will sign off at this time.  Prognosis: Excellent  Barriers to Discharge Home: No anticipated barriers  Evaluation/Treatment Tolerance: Patient tolerated treatment well  Medical Staff Made Aware: Yes  End of Session Communication: Bedside nurse  End of Session Patient Position: Up in chair, Alarm on  Plan:  No Skilled OT: At baseline function  OT Frequency: OT eval only  OT Discharge Recommendations: No further acute OT  Equipment Recommended upon Discharge: Wheeled walker  OT Recommended Transfer Status: Stand by assist  OT - OK to Discharge: Yes    Subjective   Current Problem:  1. Acute chest pain        2. Community acquired pneumonia, unspecified laterality          OT Visit Info:  OT Received On: 05/19/25  General Visit Info:  General  Reason for Referral: Decreased ADLs, chest pain, PNA  Referred By: Dr. Escobar  Past Medical History Relevant to Rehab: B cell lymphoma, hypothyroidism, hyperlipidemia, HTN, GERD, gout, sleep apnea  Family/Caregiver Present: Yes  Caregiver Feedback: spouse present  Prior to Session Communication: Bedside nurse  Patient Position Received: Bed, 2 rail up, Alarm off, not on at start of session  Preferred Learning Style: verbal, visual  General Comment: Cleared by nsg, pt met in supine, agreeable to OT session  Precautions:  Hearing/Visual Limitations: vision and hearing WFL  Medical Precautions: No known precautions/limitation      "Date/Time Vitals Session Patient Position Pulse Resp SpO2 BP MAP (mmHg)    05/19/25 0945 During OT  --  --  --  --  --  --           Vital Signs Comment: HR b/w 81-95bpm, SPO2 97-98% on room air    Pain:  Pain Assessment  Pain Assessment: 0-10  0-10 (Numeric) Pain Score: 0 - No pain    Objective   Cognition:  Overall Cognitive Status: Within Functional Limits  Orientation Level: Oriented X4  Following Commands: Follows all commands and directions without difficulty    Home Living:  Type of Home: House  Lives With: Spouse  Home Adaptive Equipment: Walker rolling or standard, Other (Comment) (rollator)  Home Layout: One level, Stairs to alternate level with rails  Alternate Level Stairs-Number of Steps: stair lift to basement  Home Access: Ramped entrance  Bathroom Shower/Tub: Tub/shower unit  Bathroom Toilet: Standard  Bathroom Equipment: Shower chair with back  Bathroom Accessibility: main level     Prior Function:  Level of [place]: Independent with ADLs and functional transfers, Independent with homemaking with ambulation  Receives Help From: Family  ADL Assistance: Independent  Homemaking Assistance: Independent  Ambulatory Assistance: Independent (occasionally uses rollator \"on bad days\")  Vocational: Retired  Prior Function Comments: 2 falls in 6 months, both mechanical.  does not drive; spouse provides transportation    ADL:  Eating Assistance: Independent  Grooming Assistance: Independent  Grooming Deficit:  (standing sinkside hand hygiene and hair brushing)  Bathing Assistance: Not performed  UE Dressing Assistance: Stand by  UE Dressing Deficit: Setup (gown mgmt)  LE Dressing Assistance: Stand by  LE Dressing Deficit: Setup, Don/doff R shoe, Don/doff L shoe  Toileting Assistance with Device: Independent    Activity Tolerance:  Endurance: Decreased tolerance for upright activites  Activity Tolerance Comments: fair,  mild HOSKINS with functional mobility.  improves with seated rest break    Bed " Mobility/Transfers:   Bed Mobility  Bed Mobility: Yes  Bed Mobility 1  Bed Mobility 1: Supine to sitting  Level of Assistance 1: Independent  Bed Mobility Comments 1: HOB flat    Transfers  Transfer: Yes  Transfer 1  Technique 1: Sit to stand, Stand to sit  Transfer Level of Assistance 1: Independent  Trials/Comments 1: to/from EOB, bedside chair and toilet without assistance    Functional Mobility:  Functional Mobility  Functional Mobility Performed: Yes  Functional Mobility 1  Surface 1: Level tile  Device 1: No device  Assistance 1: Independent  Comments 1: > household distance without a device, declined use of FWW,  fair cammy, slightly unsteady but reports feeling near her baseline functional status    Sitting Balance:  Static Sitting Balance  Static Sitting-Balance Support: Feet supported, Bilateral upper extremity supported  Static Sitting-Level of Assistance: Independent  Static Sitting-Comment/Number of Minutes: EOB sitting    Vision: Vision - Basic Assessment  Current Vision: No visual deficits  Sensation:  Light Touch: No apparent deficits  Strength:  Strength Comments: LUISES WFL; observed functionally  Coordination:  Movements are Fluid and Coordinated: Yes   Hand Function:  Hand Function  Gross Grasp: Functional  Coordination: Functional  Extremities: RUE   RUE : Within Functional Limits and LUE   LUE: Within Functional Limits    Outcome Measures: [facility] Daily Activity  Putting on and taking off regular lower body clothing: None  Bathing (including washing, rinsing, drying): None  Putting on and taking off regular upper body clothing: None  Toileting, which includes using toilet, bedpan or urinal: None  Taking care of personal grooming such as brushing teeth: None  Eating Meals: None  Daily Activity - Total Score: 24      Education Documentation  No documentation found.  Education Comments  No comments found.      Goals:   no OT concerns/needs indicated at this time.  OT will sign off.  Encouraged OOB  as tolerated.

## 2025-05-19 NOTE — CARE PLAN
Problem: Respiratory  Goal: Minimize anxiety/maximize coping throughout shift  Outcome: Progressing  Goal: Minimal/no exertional discomfort or dyspnea this shift  Outcome: Progressing  Goal: No signs of respiratory distress (eg. Use of accessory muscles. Peds grunting)  Outcome: Progressing  Goal: Patent airway maintained this shift  Outcome: Progressing

## 2025-05-19 NOTE — DISCHARGE SUMMARY
Discharge Diagnosis  Chest pain           Issues Requiring Follow-Up  CXR in 3 weeks-> please follow up with PCP  Please follow up with cardiology  Take antibiotics as instructed     Discharge Meds     Medication List      START taking these medications     albuterol 90 mcg/actuation inhaler; Commonly known as: Proventil HFA;   Inhale 2 puffs every 4 hours if needed for wheezing or shortness of   breath.   aspirin 81 mg chewable tablet; Chew 1 tablet (81 mg) once daily.; Start   taking on: May 20, 2025   azithromycin 250 mg tablet; Commonly known as: Zithromax; 1 tab (250 mg)   daily for 4 days.   cefuroxime 500 mg tablet; Commonly known as: Ceftin; Take 1 tablet (500   mg) by mouth 2 times a day.     CONTINUE taking these medications     ketotifen 0.025 % (0.035 %) ophthalmic solution; Commonly known as:   Zaditor   levothyroxine 50 mcg tablet; Commonly known as: Synthroid, Levoxyl   lisinopril 10 mg tablet   metoprolol succinate XL 50 mg 24 hr tablet; Commonly known as: Toprol-XL   pravastatin 80 mg tablet; Commonly known as: Pravachol   traMADol 50 mg tablet; Commonly known as: Ultram     STOP taking these medications     ondansetron 8 mg tablet; Commonly known as: Zofran   predniSONE 50 mg tablet; Commonly known as: Deltasone   prochlorperazine 10 mg tablet; Commonly known as: Compazine       Test Results Pending At Discharge  Pending Labs       Order Current Status    Blood Culture Preliminary result    Blood Culture Preliminary result            Hospital Course   Kellie Rea is a 75 y.o. female presenting with chest pain.  She has a history of B-cell lymphoma with mets, hypothyroid, hyperlipidemia, and hypertension.  Patient states that over the last 2 weeks she has had chest pain that radiates to the arm and neck.  She states she came to the ER today because it was worsening.  Upon arrival to the emergency room she was given 325 mg of aspirin along with Solu-Medrol nitroglycerin and oxycodone.  CTA of  the chest was negative for PE but did show groundglass infiltrates and atelectasis, chest x-ray showed pulmonary vascular congestion.  She was given azithromycin and ceftriaxone in the emergency room.  Currently, she states that her chest pain has resolved.  She denies shortness of breath or she denies headache, dizziness, nausea/vomiting.     Patient was seen and examined. Patient is feeling at baseline and asking to be discharged to home.   Cardiology has seen patient and recommending OP follow up, no intervention, continue with home medications.  Patient was stared to IV ATB for possible PNA, patient denies any CP or SOB, on RA, denies any fever or chills, denies any cough. No leukocytosis.   Patient will be discharged to home with oral ATB and close OP follow up with PCP and cardiology. CXR order placed in 3 weeks. Patient was instructed to follow up with PCP and with her oncologist.     I spent over 35 minutes in professional and overall care of this patient.     Pertinent Physical Exam At Time of Discharge  Physical Exam  Vitals reviewed.   Constitutional:       Appearance: She is obese.   HENT:      Head: Normocephalic.      Nose: Nose normal.      Mouth/Throat:      Mouth: Mucous membranes are moist.   Eyes:      Extraocular Movements: Extraocular movements intact.   Cardiovascular:      Rate and Rhythm: Regular rhythm.   Pulmonary:      Effort: Pulmonary effort is normal.   Abdominal:      General: Bowel sounds are normal.   Musculoskeletal:         General: Normal range of motion.      Cervical back: Neck supple.   Skin:     General: Skin is warm.   Neurological:      Mental Status: She is alert and oriented to person, place, and time.         Outpatient Follow-Up  Future Appointments   Date Time Provider Department Center   5/23/2025 11:00 AM INF 00 ARNEX MENTOR [id number] [facility]   7/17/2025  9:00 AM INF 00 ARNEX MENTOR [id number] [facility]   7/17/2025 10:00 AM JUAN MENTOR CT WESMntCT [facility]   7/22/2025  10:30 AM Contreras Rowell MD [id number] [facility]         Paul Agosto, APRN-CNP

## 2025-05-20 ENCOUNTER — TELEPHONE (OUTPATIENT)
Dept: INPATIENT UNIT | Facility: HOSPITAL | Age: 75
End: 2025-05-20
Payer: MEDICARE

## 2025-05-20 LAB
ATRIAL RATE: 82 BPM
P AXIS: 75 DEGREES
P OFFSET: 213 MS
P ONSET: 163 MS
PR INTERVAL: 126 MS
Q ONSET: 226 MS
QRS COUNT: 13 BEATS
QRS DURATION: 90 MS
QT INTERVAL: 384 MS
QTC CALCULATION(BAZETT): 448 MS
QTC FREDERICIA: 426 MS
R AXIS: -48 DEGREES
T AXIS: 63 DEGREES
T OFFSET: 418 MS
VENTRICULAR RATE: 82 BPM

## 2025-05-23 ENCOUNTER — INFUSION (OUTPATIENT)
Dept: HEMATOLOGY/ONCOLOGY | Facility: CLINIC | Age: 75
End: 2025-05-23
Payer: COMMERCIAL

## 2025-05-23 DIAGNOSIS — C78.5 SECONDARY MALIGNANT NEOPLASM OF LARGE INTESTINE AND RECTUM (MULTI): ICD-10-CM

## 2025-05-23 DIAGNOSIS — C85.10 B-CELL LYMPHOMA, UNSPECIFIED B-CELL LYMPHOMA TYPE, UNSPECIFIED BODY REGION (MULTI): ICD-10-CM

## 2025-05-23 DIAGNOSIS — C83.30 DIFFUSE LARGE B-CELL LYMPHOMA, UNSPECIFIED BODY REGION (MULTI): ICD-10-CM

## 2025-05-23 LAB
BACTERIA BLD CULT: NORMAL
BACTERIA BLD CULT: NORMAL

## 2025-05-23 PROCEDURE — 2500000004 HC RX 250 GENERAL PHARMACY W/ HCPCS (ALT 636 FOR OP/ED): Mod: JZ | Performed by: INTERNAL MEDICINE

## 2025-05-23 PROCEDURE — 96523 IRRIG DRUG DELIVERY DEVICE: CPT

## 2025-05-23 RX ORDER — HEPARIN SODIUM,PORCINE/PF 10 UNIT/ML
50 SYRINGE (ML) INTRAVENOUS AS NEEDED
OUTPATIENT
Start: 2025-05-23

## 2025-05-23 RX ORDER — HEPARIN 100 UNIT/ML
500 SYRINGE INTRAVENOUS AS NEEDED
OUTPATIENT
Start: 2025-05-23

## 2025-05-23 RX ORDER — HEPARIN 100 UNIT/ML
500 SYRINGE INTRAVENOUS AS NEEDED
Status: DISCONTINUED | OUTPATIENT
Start: 2025-05-23 | End: 2025-05-23 | Stop reason: HOSPADM

## 2025-05-23 RX ORDER — HEPARIN SODIUM,PORCINE/PF 10 UNIT/ML
50 SYRINGE (ML) INTRAVENOUS AS NEEDED
Status: DISCONTINUED | OUTPATIENT
Start: 2025-05-23 | End: 2025-05-23 | Stop reason: HOSPADM

## 2025-05-23 RX ADMIN — HEPARIN 500 UNITS: 100 SYRINGE at 10:42

## 2025-06-05 ENCOUNTER — HOSPITAL ENCOUNTER (OUTPATIENT)
Dept: RADIOLOGY | Facility: HOSPITAL | Age: 75
Discharge: HOME | End: 2025-06-05
Payer: MEDICARE

## 2025-06-05 DIAGNOSIS — J18.9 COMMUNITY ACQUIRED PNEUMONIA, UNSPECIFIED LATERALITY: ICD-10-CM

## 2025-06-05 PROCEDURE — 71046 X-RAY EXAM CHEST 2 VIEWS: CPT

## 2025-06-05 PROCEDURE — 71046 X-RAY EXAM CHEST 2 VIEWS: CPT | Performed by: RADIOLOGY

## 2025-06-20 ENCOUNTER — OFFICE VISIT (OUTPATIENT)
Facility: CLINIC | Age: 75
End: 2025-06-20
Payer: MEDICARE

## 2025-06-20 ENCOUNTER — HOSPITAL ENCOUNTER (OUTPATIENT)
Dept: RADIOLOGY | Facility: HOSPITAL | Age: 75
Discharge: HOME | End: 2025-06-20
Payer: MEDICARE

## 2025-06-20 VITALS
BODY MASS INDEX: 33.3 KG/M2 | SYSTOLIC BLOOD PRESSURE: 138 MMHG | WEIGHT: 194 LBS | OXYGEN SATURATION: 98 % | DIASTOLIC BLOOD PRESSURE: 80 MMHG | HEART RATE: 68 BPM

## 2025-06-20 DIAGNOSIS — I10 ESSENTIAL HYPERTENSION: Primary | ICD-10-CM

## 2025-06-20 DIAGNOSIS — I35.1 NONRHEUMATIC AORTIC (VALVE) INSUFFICIENCY: ICD-10-CM

## 2025-06-20 DIAGNOSIS — E78.49 OTHER HYPERLIPIDEMIA: ICD-10-CM

## 2025-06-20 DIAGNOSIS — M54.12 RADICULOPATHY, CERVICAL REGION: ICD-10-CM

## 2025-06-20 PROCEDURE — 1036F TOBACCO NON-USER: CPT | Performed by: INTERNAL MEDICINE

## 2025-06-20 PROCEDURE — 3079F DIAST BP 80-89 MM HG: CPT | Performed by: INTERNAL MEDICINE

## 2025-06-20 PROCEDURE — 99214 OFFICE O/P EST MOD 30 MIN: CPT | Performed by: INTERNAL MEDICINE

## 2025-06-20 PROCEDURE — 1159F MED LIST DOCD IN RCRD: CPT | Performed by: INTERNAL MEDICINE

## 2025-06-20 PROCEDURE — 3075F SYST BP GE 130 - 139MM HG: CPT | Performed by: INTERNAL MEDICINE

## 2025-06-20 PROCEDURE — 72141 MRI NECK SPINE W/O DYE: CPT

## 2025-06-20 PROCEDURE — 99212 OFFICE O/P EST SF 10 MIN: CPT | Performed by: INTERNAL MEDICINE

## 2025-06-20 PROCEDURE — 1126F AMNT PAIN NOTED NONE PRSNT: CPT | Performed by: INTERNAL MEDICINE

## 2025-06-20 RX ORDER — OXYCODONE AND ACETAMINOPHEN 5; 325 MG/1; MG/1
1 TABLET ORAL EVERY 8 HOURS PRN
COMMUNITY
Start: 2025-06-18

## 2025-06-20 RX ORDER — MELOXICAM 15 MG/1
15 TABLET ORAL DAILY
COMMUNITY
Start: 2025-06-02

## 2025-06-20 RX ORDER — PREDNISONE 20 MG/1
20 TABLET ORAL DAILY
COMMUNITY
Start: 2025-06-18

## 2025-06-20 ASSESSMENT — LIFESTYLE VARIABLES: TOTAL SCORE: 0

## 2025-06-20 ASSESSMENT — PAIN SCALES - GENERAL: PAINLEVEL_OUTOF10: 0-NO PAIN

## 2025-06-20 ASSESSMENT — ENCOUNTER SYMPTOMS
DIZZINESS: 0
CLAUDICATION: 0
PALPITATIONS: 0
MYALGIAS: 0
DYSPNEA ON EXERTION: 0
OCCASIONAL FEELINGS OF UNSTEADINESS: 1
FEVER: 0
WEAKNESS: 0
SYNCOPE: 0
DEPRESSION: 0
WEIGHT GAIN: 0
SHORTNESS OF BREATH: 0
WHEEZING: 0
IRREGULAR HEARTBEAT: 0
COUGH: 0
LOSS OF SENSATION IN FEET: 0
DIAPHORESIS: 0
PND: 0
WEIGHT LOSS: 0
ORTHOPNEA: 0
NEAR-SYNCOPE: 0

## 2025-06-20 ASSESSMENT — PATIENT HEALTH QUESTIONNAIRE - PHQ9
SUM OF ALL RESPONSES TO PHQ9 QUESTIONS 1 AND 2: 0
2. FEELING DOWN, DEPRESSED OR HOPELESS: NOT AT ALL
1. LITTLE INTEREST OR PLEASURE IN DOING THINGS: NOT AT ALL

## 2025-06-20 NOTE — ASSESSMENT & PLAN NOTE
Controlled, continue lisinopril, advised to check blood pressure at home 2-3 times a week and record.

## 2025-06-20 NOTE — ASSESSMENT & PLAN NOTE
Continue pravastatin.  Lipids are normally monitored by her independent primary care physician.  Repeating a lipid panel and asked that she get it done with  for at least get me a copy of done with her PCP. Lifestyle modifications were discussed. I advocated for mediterranean and plant based eating. We also discussed exercise. 150 minutes a week of moderate intensity exercise is recommended per our ACC/AHA guidelines

## 2025-06-20 NOTE — PROGRESS NOTES
Subjective      Chief Complaint   Patient presents with    New Patient Visit     Hospital follow up        75-year-old female whom I saw in the hospital last month when she presented with left arm and upper chest pain.  She had no history of heart disease.  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  The pain was worse with arm movements, better with steroids.  High-sensitivity troponins were negligible and her EKG showed sinus rhythm with no concerning ST-T wave abnormalities.  She had a CT angiogram is negative for PE but did comment on diffuse groundglass opacities possibly representing atypical pneumonia.  We elected not to pursue an ischemic workup.  We did however suggest that she follow-up for formal ASCVD restratification.  Reference was an echocardiogram from a year ago showing normal left ventricular size and function, stage I diastolic dysfunction, and mild aortic insufficiency.  We had suggested a repeat echocardiogram and follow-up.           Review of Systems   Constitutional: Negative for diaphoresis, fever, weight gain and weight loss.   Eyes:  Negative for visual disturbance.   Cardiovascular:  Negative for chest pain, claudication, dyspnea on exertion, irregular heartbeat, leg swelling, near-syncope, orthopnea, palpitations, paroxysmal nocturnal dyspnea and syncope.   Respiratory:  Negative for cough, shortness of breath and wheezing.    Musculoskeletal:  Negative for muscle weakness and myalgias.   Neurological:  Negative for dizziness and weakness.   All other systems reviewed and are negative.       Medical History[1]     Surgical History[2]     Social History     Socioeconomic History    Marital status:      Spouse name: Not on file    Number of children: Not on file    Years of education: Not on file    Highest education level: Not on file   Occupational History    Not on file   Tobacco Use    Smoking status: Former     Current  packs/day: 0.00     Average packs/day: 1 pack/day for 31.0 years (31.0 ttl pk-yrs)     Types: Cigarettes     Start date:      Quit date:      Years since quittin.4    Smokeless tobacco: Never   Vaping Use    Vaping status: Never Used   Substance and Sexual Activity    Alcohol use: Never    Drug use: Never    Sexual activity: Defer   Other Topics Concern    Not on file   Social History Narrative    Not on file     Social Drivers of Health     Financial Resource Strain: Low Risk  (2025)    Overall Financial Resource Strain (CARDIA)     Difficulty of Paying Living Expenses: Not very hard   Food Insecurity: No Food Insecurity (2025)    Hunger Vital Sign     Worried About Running Out of Food in the Last Year: Never true     Ran Out of Food in the Last Year: Never true   Transportation Needs: No Transportation Needs (2025)    PRAPARE - Transportation     Lack of Transportation (Medical): No     Lack of Transportation (Non-Medical): No   Physical Activity: Not on file   Stress: Not on file   Social Connections: Not on file   Intimate Partner Violence: Not At Risk (2025)    Humiliation, Afraid, Rape, and Kick questionnaire     Fear of Current or Ex-Partner: No     Emotionally Abused: No     Physically Abused: No     Sexually Abused: No   Housing Stability: Low Risk  (2025)    Housing Stability Vital Sign     Unable to Pay for Housing in the Last Year: No     Number of Times Moved in the Last Year: 1     Homeless in the Last Year: No        Family History[3]     OBJECTIVE:    Vitals:    25 1043   BP: 138/80   Pulse: 68   SpO2: 98%        Vitals reviewed.   Constitutional:       Appearance: Normal and healthy appearance. Not in distress.   Pulmonary:      Effort: Pulmonary effort is normal.      Breath sounds: Normal breath sounds.   Cardiovascular:      Normal rate. Regular rhythm. Normal S1. Normal S2.       Murmurs: There is no murmur.      No gallop.  No click.   Pulses:      Intact distal pulses.   Edema:     Peripheral edema absent.   Skin:     General: Skin is warm and dry.   Neurological:      General: No focal deficit present.          Lab Review:   Lab Results   Component Value Date     05/19/2025    K 4.4 05/19/2025     05/19/2025    CO2 22 05/19/2025    BUN 20 05/19/2025    CREATININE 0.72 05/19/2025    GLUCOSE 153 (H) 05/19/2025    CALCIUM 8.9 05/19/2025     Lab Results   Component Value Date    CHOL 127 (L) 08/04/2022    TRIG 160 (H) 08/04/2022    HDL 44 (L) 08/04/2022       Lab Results   Component Value Date    LDLCALC 51 (L) 08/04/2022        Essential hypertension  Controlled, continue lisinopril, advised to check blood pressure at home 2-3 times a week and record.    Hyperlipidemia  Continue pravastatin.  Lipids are normally monitored by her independent primary care physician.  Repeating a lipid panel and asked that she get it done with  for at least get me a copy of done with her PCP. Lifestyle modifications were discussed. I advocated for mediterranean and plant based eating. We also discussed exercise. 150 minutes a week of moderate intensity exercise is recommended per our ACC/AHA guidelines      Nonrheumatic aortic (valve) insufficiency  Mild progressive stage B on echocardiogram 2024.  Will be repeating an echocardiogram in 2026.            [1]   Past Medical History:  Diagnosis Date    GERD (gastroesophageal reflux disease)     Gout     Hyperlipidemia     Hypertension     Hypothyroidism     Sleep apnea    [2]   Past Surgical History:  Procedure Laterality Date    APPENDECTOMY      CATARACT EXTRACTION Bilateral     COLONOSCOPY     [3] No family history on file.

## 2025-06-22 LAB
CHOLEST SERPL-MCNC: 161 MG/DL
CHOLEST/HDLC SERPL: 1.9 (CALC)
HDLC SERPL-MCNC: 83 MG/DL
LDLC SERPL CALC-MCNC: 55 MG/DL (CALC)
NONHDLC SERPL-MCNC: 78 MG/DL (CALC)
TRIGL SERPL-MCNC: 145 MG/DL

## 2025-07-17 ENCOUNTER — INFUSION (OUTPATIENT)
Dept: HEMATOLOGY/ONCOLOGY | Facility: CLINIC | Age: 75
End: 2025-07-17
Payer: MEDICARE

## 2025-07-17 ENCOUNTER — HOSPITAL ENCOUNTER (OUTPATIENT)
Dept: RADIOLOGY | Facility: CLINIC | Age: 75
Discharge: HOME | End: 2025-07-17
Payer: MEDICARE

## 2025-07-17 VITALS
SYSTOLIC BLOOD PRESSURE: 183 MMHG | OXYGEN SATURATION: 95 % | TEMPERATURE: 97.3 F | HEART RATE: 105 BPM | DIASTOLIC BLOOD PRESSURE: 82 MMHG | BODY MASS INDEX: 33.17 KG/M2 | RESPIRATION RATE: 19 BRPM | WEIGHT: 193.23 LBS

## 2025-07-17 DIAGNOSIS — C85.10 B-CELL LYMPHOMA, UNSPECIFIED B-CELL LYMPHOMA TYPE, UNSPECIFIED BODY REGION (MULTI): ICD-10-CM

## 2025-07-17 DIAGNOSIS — C78.5 SECONDARY MALIGNANT NEOPLASM OF LARGE INTESTINE AND RECTUM (MULTI): ICD-10-CM

## 2025-07-17 DIAGNOSIS — C83.30 DIFFUSE LARGE B-CELL LYMPHOMA, UNSPECIFIED BODY REGION (MULTI): ICD-10-CM

## 2025-07-17 PROCEDURE — 2550000001 HC RX 255 CONTRASTS: Performed by: INTERNAL MEDICINE

## 2025-07-17 PROCEDURE — 96523 IRRIG DRUG DELIVERY DEVICE: CPT

## 2025-07-17 PROCEDURE — 2500000004 HC RX 250 GENERAL PHARMACY W/ HCPCS (ALT 636 FOR OP/ED): Performed by: INTERNAL MEDICINE

## 2025-07-17 PROCEDURE — 74177 CT ABD & PELVIS W/CONTRAST: CPT

## 2025-07-17 RX ORDER — HEPARIN SODIUM,PORCINE/PF 10 UNIT/ML
50 SYRINGE (ML) INTRAVENOUS AS NEEDED
OUTPATIENT
Start: 2025-07-17

## 2025-07-17 RX ORDER — HEPARIN SODIUM,PORCINE/PF 10 UNIT/ML
50 SYRINGE (ML) INTRAVENOUS AS NEEDED
Status: DISCONTINUED | OUTPATIENT
Start: 2025-07-17 | End: 2025-07-17 | Stop reason: HOSPADM

## 2025-07-17 RX ORDER — HEPARIN 100 UNIT/ML
500 SYRINGE INTRAVENOUS AS NEEDED
OUTPATIENT
Start: 2025-07-17

## 2025-07-17 RX ORDER — HEPARIN 100 UNIT/ML
500 SYRINGE INTRAVENOUS AS NEEDED
Status: DISCONTINUED | OUTPATIENT
Start: 2025-07-17 | End: 2025-07-17 | Stop reason: HOSPADM

## 2025-07-17 RX ADMIN — IOHEXOL 75 ML: 350 INJECTION, SOLUTION INTRAVENOUS at 09:59

## 2025-07-17 RX ADMIN — HEPARIN 500 UNITS: 100 SYRINGE at 10:12

## 2025-07-17 ASSESSMENT — PAIN SCALES - GENERAL: PAINLEVEL_OUTOF10: 8

## 2025-07-17 NOTE — PROGRESS NOTES
Port accessed, brisk blood return, flushed with 10ml NS and capped. Escorted to CT scan.    Patient returned from CT scan. Port noted to have brisk blood return, flushed with saline followed by heparin lock. Odom needle removed, bandaid applied.    No untoward events noted.

## 2025-07-22 ENCOUNTER — OFFICE VISIT (OUTPATIENT)
Dept: HEMATOLOGY/ONCOLOGY | Facility: CLINIC | Age: 75
End: 2025-07-22
Payer: MEDICARE

## 2025-07-22 VITALS
TEMPERATURE: 97.3 F | SYSTOLIC BLOOD PRESSURE: 145 MMHG | WEIGHT: 197.31 LBS | RESPIRATION RATE: 16 BRPM | OXYGEN SATURATION: 96 % | HEART RATE: 66 BPM | DIASTOLIC BLOOD PRESSURE: 80 MMHG | BODY MASS INDEX: 33.87 KG/M2

## 2025-07-22 DIAGNOSIS — C83.30 DIFFUSE LARGE B-CELL LYMPHOMA, UNSPECIFIED BODY REGION (MULTI): ICD-10-CM

## 2025-07-22 DIAGNOSIS — C78.5 SECONDARY MALIGNANT NEOPLASM OF LARGE INTESTINE AND RECTUM (MULTI): ICD-10-CM

## 2025-07-22 LAB
ALBUMIN SERPL BCP-MCNC: 4.2 G/DL (ref 3.4–5)
ALP SERPL-CCNC: 103 U/L (ref 33–136)
ALT SERPL W P-5'-P-CCNC: 11 U/L (ref 7–45)
ANION GAP SERPL CALC-SCNC: 13 MMOL/L (ref 10–20)
AST SERPL W P-5'-P-CCNC: 11 U/L (ref 9–39)
BASOPHILS # BLD AUTO: 0.03 X10*3/UL (ref 0–0.1)
BASOPHILS NFR BLD AUTO: 0.3 %
BILIRUB SERPL-MCNC: 0.8 MG/DL (ref 0–1.2)
BUN SERPL-MCNC: 18 MG/DL (ref 6–23)
CALCIUM SERPL-MCNC: 9.3 MG/DL (ref 8.6–10.3)
CHLORIDE SERPL-SCNC: 104 MMOL/L (ref 98–107)
CO2 SERPL-SCNC: 26 MMOL/L (ref 21–32)
CREAT SERPL-MCNC: 0.77 MG/DL (ref 0.5–1.05)
EGFRCR SERPLBLD CKD-EPI 2021: 80 ML/MIN/1.73M*2
EOSINOPHIL # BLD AUTO: 0.03 X10*3/UL (ref 0–0.4)
EOSINOPHIL NFR BLD AUTO: 0.3 %
ERYTHROCYTE [DISTWIDTH] IN BLOOD BY AUTOMATED COUNT: 14.7 % (ref 11.5–14.5)
GLUCOSE SERPL-MCNC: 109 MG/DL (ref 74–99)
HCT VFR BLD AUTO: 42.6 % (ref 36–46)
HGB BLD-MCNC: 13.6 G/DL (ref 12–16)
IMM GRANULOCYTES # BLD AUTO: 0.03 X10*3/UL (ref 0–0.5)
IMM GRANULOCYTES NFR BLD AUTO: 0.3 % (ref 0–0.9)
LYMPHOCYTES # BLD AUTO: 1.07 X10*3/UL (ref 0.8–3)
LYMPHOCYTES NFR BLD AUTO: 9.9 %
MCH RBC QN AUTO: 28.6 PG (ref 26–34)
MCHC RBC AUTO-ENTMCNC: 31.9 G/DL (ref 32–36)
MCV RBC AUTO: 90 FL (ref 80–100)
MONOCYTES # BLD AUTO: 0.3 X10*3/UL (ref 0.05–0.8)
MONOCYTES NFR BLD AUTO: 2.8 %
NEUTROPHILS # BLD AUTO: 9.34 X10*3/UL (ref 1.6–5.5)
NEUTROPHILS NFR BLD AUTO: 86.4 %
NRBC BLD-RTO: ABNORMAL /100{WBCS}
PLATELET # BLD AUTO: 198 X10*3/UL (ref 150–450)
POTASSIUM SERPL-SCNC: 4.2 MMOL/L (ref 3.5–5.3)
PROT SERPL-MCNC: 7 G/DL (ref 6.4–8.2)
RBC # BLD AUTO: 4.75 X10*6/UL (ref 4–5.2)
SODIUM SERPL-SCNC: 139 MMOL/L (ref 136–145)
WBC # BLD AUTO: 10.8 X10*3/UL (ref 4.4–11.3)

## 2025-07-22 PROCEDURE — 3077F SYST BP >= 140 MM HG: CPT | Performed by: INTERNAL MEDICINE

## 2025-07-22 PROCEDURE — 1159F MED LIST DOCD IN RCRD: CPT | Performed by: INTERNAL MEDICINE

## 2025-07-22 PROCEDURE — 1125F AMNT PAIN NOTED PAIN PRSNT: CPT | Performed by: INTERNAL MEDICINE

## 2025-07-22 PROCEDURE — 99214 OFFICE O/P EST MOD 30 MIN: CPT | Performed by: INTERNAL MEDICINE

## 2025-07-22 PROCEDURE — 80053 COMPREHEN METABOLIC PANEL: CPT | Performed by: INTERNAL MEDICINE

## 2025-07-22 PROCEDURE — 36415 COLL VENOUS BLD VENIPUNCTURE: CPT | Performed by: INTERNAL MEDICINE

## 2025-07-22 PROCEDURE — 85025 COMPLETE CBC W/AUTO DIFF WBC: CPT | Performed by: INTERNAL MEDICINE

## 2025-07-22 PROCEDURE — 3079F DIAST BP 80-89 MM HG: CPT | Performed by: INTERNAL MEDICINE

## 2025-07-22 ASSESSMENT — PAIN SCALES - GENERAL: PAINLEVEL_OUTOF10: 1

## 2025-07-22 NOTE — PROGRESS NOTES
Patient ID: Kellie Rae is a 75 y.o. female.  Referring Physician: Contreras Rowell MD  [id number]  [place] [id number]  Primary Care Provider: Douglas Yañez MD  Visit Type:  {Visit Type:10756}     {Telehealth Consent:92243}    Subjective    HPI    Review of Systems - Oncology     Objective   BSA: 2.01 meters squared  /80 (BP Location: Left arm)   Pulse 66   Temp 36.3 °C (97.3 °F)   Resp 16   Wt 89.5 kg (197 lb 5 oz)   SpO2 96%   BMI 33.87 kg/m²      has a past medical history of GERD (gastroesophageal reflux disease), Gout, Hyperlipidemia, Hypertension, Hypothyroidism, and Sleep apnea.   has a past surgical history that includes Appendectomy; Colonoscopy; and Cataract extraction (Bilateral).  Family History[1]  Oncology History   Lymphoma   2/23/2024 Initial Diagnosis    Lymphoma (CMS/HCC)     3/7/2024 -  Chemotherapy    R-CHOP (Cyclophosphamide / DOXOrubicin / VinCRIStine / PredniSONE) + RiTUXimab, 21 Day Cycles         Kellie Rae  reports that she quit smoking about 26 years ago. Her smoking use included cigarettes. She started smoking about 57 years ago. She has a 31 pack-year smoking history. She has never used smokeless tobacco.  She  reports no history of alcohol use.  She  reports no history of drug use.    Physical Exam    WBC   Date/Time Value Ref Range Status   05/19/2025 04:21 AM 5.6 4.4 - 11.3 x10*3/uL Final   05/18/2025 02:48 PM 8.7 4.4 - 11.3 x10*3/uL Final   06/20/2024 08:51 AM 11.9 (H) 4.4 - 11.3 x10*3/uL Final     nRBC   Date Value Ref Range Status   05/19/2025 0.0 0.0 - 0.0 /100 WBCs Final   05/18/2025 0.0 0.0 - 0.0 /100 WBCs Final   05/09/2024   Final     Comment:     Not Measured     RBC   Date Value Ref Range Status   05/19/2025 4.56 4.00 - 5.20 x10*6/uL Final   05/18/2025 5.02 4.00 - 5.20 x10*6/uL Final   06/20/2024 3.13 (L) 4.00 - 5.20 x10*6/uL Final     Hemoglobin   Date Value Ref Range Status   05/19/2025 12.9 12.0 - 16.0 g/dL Final    05/18/2025 14.1 12.0 - 16.0 g/dL Final   06/20/2024 9.4 (L) 12.0 - 16.0 g/dL Final     Hematocrit   Date Value Ref Range Status   05/19/2025 40.2 36.0 - 46.0 % Final   05/18/2025 44.6 36.0 - 46.0 % Final   06/20/2024 30.8 (L) 36.0 - 46.0 % Final     MCV   Date/Time Value Ref Range Status   05/19/2025 04:21 AM 88 80 - 100 fL Final   05/18/2025 02:48 PM 89 80 - 100 fL Final   06/20/2024 08:51 AM 98 80 - 100 fL Final     MCH   Date/Time Value Ref Range Status   05/19/2025 04:21 AM 28.3 26.0 - 34.0 pg Final   05/18/2025 02:48 PM 28.1 26.0 - 34.0 pg Final   06/20/2024 08:51 AM 30.0 26.0 - 34.0 pg Final     MCHC   Date/Time Value Ref Range Status   05/19/2025 04:21 AM 32.1 32.0 - 36.0 g/dL Final   05/18/2025 02:48 PM 31.6 (L) 32.0 - 36.0 g/dL Final   06/20/2024 08:51 AM 30.5 (L) 32.0 - 36.0 g/dL Final     RDW   Date/Time Value Ref Range Status   05/19/2025 04:21 AM 13.7 11.5 - 14.5 % Final   05/18/2025 02:48 PM 13.9 11.5 - 14.5 % Final   06/20/2024 08:51 AM 17.5 (H) 11.5 - 14.5 % Final     Platelets   Date/Time Value Ref Range Status   05/19/2025 04:21  150 - 450 x10*3/uL Final   05/18/2025 02:48  150 - 450 x10*3/uL Final   06/20/2024 08:51  150 - 450 x10*3/uL Final     MPV   Date/Time Value Ref Range Status   09/24/2019 09:55 AM 10.2 7.0 - 12.6 CU Final   09/10/2019 10:30 AM 10.6 7.0 - 12.6 CU Final   08/27/2019 09:53 AM 10.4 7.0 - 12.6 CU Final     Neutrophils %   Date/Time Value Ref Range Status   05/18/2025 02:48 PM 76.5 40.0 - 80.0 % Final   06/20/2024 08:51 AM 91.8 40.0 - 80.0 % Final   05/30/2024 08:23 AM 91.1 40.0 - 80.0 % Final     Immature Granulocytes %, Automated   Date/Time Value Ref Range Status   05/18/2025 02:48 PM 0.3 0.0 - 0.9 % Final     Comment:     Immature Granulocyte Count (IG) includes promyelocytes, myelocytes and metamyelocytes but does not include bands. Percent differential counts (%) should be interpreted in the context of the absolute cell counts (cells/UL).   06/20/2024  08:51 AM 0.3 0.0 - 0.9 % Final     Comment:     Immature Granulocyte Count (IG) includes promyelocytes, myelocytes and metamyelocytes but does not include bands. Percent differential counts (%) should be interpreted in the context of the absolute cell counts (cells/UL).   05/30/2024 08:23 AM 0.6 0.0 - 0.9 % Final     Comment:     Immature Granulocyte Count (IG) includes promyelocytes, myelocytes and metamyelocytes but does not include bands. Percent differential counts (%) should be interpreted in the context of the absolute cell counts (cells/UL).     Lymphocytes %, Manual   Date/Time Value Ref Range Status   03/14/2024 12:16 PM 90.0 13.0 - 44.0 % Final     Lymphocytes %   Date/Time Value Ref Range Status   05/18/2025 02:48 PM 12.5 13.0 - 44.0 % Final   06/20/2024 08:51 AM 4.5 13.0 - 44.0 % Final   05/30/2024 08:23 AM 4.5 13.0 - 44.0 % Final     Monocytes %, Manual   Date/Time Value Ref Range Status   03/14/2024 12:16 PM 8.0 2.0 - 10.0 % Final     Monocytes %   Date/Time Value Ref Range Status   05/18/2025 02:48 PM 9.4 2.0 - 10.0 % Final   06/20/2024 08:51 AM 3.1 2.0 - 10.0 % Final   05/30/2024 08:23 AM 3.5 2.0 - 10.0 % Final     Eosinophils %, Manual   Date/Time Value Ref Range Status   03/14/2024 12:16 PM 0.0 0.0 - 6.0 % Final     Eosinophils %   Date/Time Value Ref Range Status   05/18/2025 02:48 PM 1.1 0.0 - 6.0 % Final   06/20/2024 08:51 AM 0.1 0.0 - 6.0 % Final   05/30/2024 08:23 AM 0.1 0.0 - 6.0 % Final     Basophils %, Manual   Date/Time Value Ref Range Status   03/14/2024 12:16 PM 2.0 0.0 - 2.0 % Final     Basophils %   Date/Time Value Ref Range Status   05/18/2025 02:48 PM 0.2 0.0 - 2.0 % Final   06/20/2024 08:51 AM 0.2 0.0 - 2.0 % Final   05/30/2024 08:23 AM 0.2 0.0 - 2.0 % Final     Neutrophils Absolute   Date/Time Value Ref Range Status   05/18/2025 02:48 PM 6.64 (H) 1.60 - 5.50 x10*3/uL Final     Comment:     Percent differential counts (%) should be interpreted in the context of the absolute cell  "counts (cells/uL).   06/20/2024 08:51 AM 10.93 (H) 1.60 - 5.50 x10*3/uL Final     Comment:     Percent differential counts (%) should be interpreted in the context of the absolute cell counts (cells/uL).   05/30/2024 08:23 AM 11.44 (H) 1.60 - 5.50 x10*3/uL Final     Comment:     Percent differential counts (%) should be interpreted in the context of the absolute cell counts (cells/uL).     Immature Granulocytes Absolute, Automated   Date/Time Value Ref Range Status   05/18/2025 02:48 PM 0.03 0.00 - 0.50 x10*3/uL Final   06/20/2024 08:51 AM 0.03 0.00 - 0.50 x10*3/uL Final   05/30/2024 08:23 AM 0.08 0.00 - 0.50 x10*3/uL Final     Lymphocytes Absolute   Date/Time Value Ref Range Status   05/18/2025 02:48 PM 1.09 0.80 - 3.00 x10*3/uL Final   06/20/2024 08:51 AM 0.53 (L) 0.80 - 3.00 x10*3/uL Final   05/30/2024 08:23 AM 0.57 (L) 0.80 - 3.00 x10*3/uL Final     Monocytes Absolute   Date/Time Value Ref Range Status   05/18/2025 02:48 PM 0.82 (H) 0.05 - 0.80 x10*3/uL Final   06/20/2024 08:51 AM 0.37 0.05 - 0.80 x10*3/uL Final   05/30/2024 08:23 AM 0.44 0.05 - 0.80 x10*3/uL Final     Eosinophils Absolute   Date/Time Value Ref Range Status   05/18/2025 02:48 PM 0.10 0.00 - 0.40 x10*3/uL Final   06/20/2024 08:51 AM 0.01 0.00 - 0.40 x10*3/uL Final   05/30/2024 08:23 AM 0.01 0.00 - 0.40 x10*3/uL Final     Eosinophils Absolute, Manual   Date/Time Value Ref Range Status   03/14/2024 12:16 PM 0.00 0.00 - 0.40 x10*3/uL Final     Basophils Absolute   Date/Time Value Ref Range Status   05/18/2025 02:48 PM 0.02 0.00 - 0.10 x10*3/uL Final   06/20/2024 08:51 AM 0.02 0.00 - 0.10 x10*3/uL Final   05/30/2024 08:23 AM 0.02 0.00 - 0.10 x10*3/uL Final     Basophils Absolute, Manual   Date/Time Value Ref Range Status   03/14/2024 12:16 PM 0.01 0.00 - 0.10 x10*3/uL Final       No components found for: \"PT\"  No results found for: \"APTT\"    Assessment/Plan           {Assess/PlanSmartLinks:24445}         Contreras Rowell MD                       "            [1]  No family history on file.

## 2025-07-22 NOTE — PATIENT INSTRUCTIONS
Today you met with your hematologist/oncologist.  Recent labs were discussed and questions answered.  Scheduling orders were placed.  While we appreciate that you verbalized understanding, if any questions arise after leaving, please do not hesitate to call the office to discuss.  [phone] Peter Yousif.  Dr Rowell will see you in 6 months after a restaging PET scan. Port flushes have been scheduled through that appointment.

## 2025-07-22 NOTE — PROGRESS NOTES
[facility]  [facility]  [address]  [address]  [facility] [address]  Dept: [phone]  Dept Fax: [phone]     Patient: Kellie Rae, Age: 75 y.o., SEX: female , MRN:[id number],   PCP: Douglas Yañez MD        Resident:  Bj Jiménez MD   Preferred Pharmacy:   [facility] [address] [facility] [address] [facility] [address]  [address] [facility] [address]  Phone: [phone] Fax: [phone]     [facility]  [address], [address]  [facility] [address]  Phone: [phone] Fax: [phone]    [facility] [place], [place] - [address]  [address] [facility] [address]  Phone: [phone] Fax: [phone]        Chief Complaint     Patient: Kellie Rae is a 75 y.o. female who presented to the clinic for  follow up       Subjective    Subjective      HPI    Kellie Rae is a 75 y.o. year old female patient with a PMH significant for history of diffuse large B-cell lymphoma, HTN, HLD, aortic valve insufficiency, hypothyroidism Presents to the clinic for 6-month follow-up    Referred by primary care doctor because of the results of CT chest abdomen and pelvis showing a soft tissue mass lesion within the mesentery with multiple retroperitoneal and mesenteric lymph and adenopathy.  Radiographically the findings could represent lymphoma.  I was able to elicit from had that his symptoms began about 5 years ago with pain in the abdomen and the back she subsequently was diagnosed with gout and it is not clear the etiology of the of the gout currently on allopurinol and colchicine.  She is also on nonsteroidals anti-inflammatory agents as well.  She was referred to medical oncology because a CT scan of the abdomen showed intra-abdominal lesions reminiscent of lymphadenopathy/Lymphoma.  Biopsy has been ordered to confirm the diagnosis.     FINAL DIAGNOSIS      A. SOFT TISSUE MASS, MESENTERY, BIOPSY:    DIFFUSE LARGE B CELL  LYMPHOMA, FINAL CLASSIFICATION PENDING GENETIC STUDIES,    SUBTYPE BY PALMA CRITERIA:  NON GERMINAL CENTER CELL TYPE   MYC/BCL-2 EXPRESSION: NOT A DOUBLE EXPRESSOR (MYC-,BCL-2+)      Completed 6 cycle R CHOP : 3/7/24-6/20/24 Q 21 D cycle: PET scan negative for evidence of residual hypermetabolic disease see below.     Review of Systems   Constitutional: Negative.    HENT:  Negative.     Eyes: Negative.    Respiratory: Negative.     Cardiovascular: Negative.       IMPRESSION:  1. No evidence of hypermetabolic lymphadenopathy or metastatic disease throughout the body (Deauville 1). 2. Diffuse FDG uptake within the bone marrow, which is nonspecific, however likely related to patient's ongoing chemotherapy. No focal  osseous FDG uptake to suggest osseous metastatic disease    07/22/2025:  We dicussed results from CT chest from 05/18 which was negative from any lymphadenopathy. Also reviewed CT AP from 07/17/2025 that also showed no lympadenopathy. Patient had concerns regarding her chemo-pot, we discussed we will wait for 2 additional CT chest prior to removing the chemo-port.     Of note, patient previously admitted to  [facility] on 05/18/2025 with chest pain over the 2 weeks. he was given 325 mg of aspirin along with Solu-Medrol nitroglycerin and oxycodone. CTA of the chest was negative for PE but did show groundglass infiltrates and atelectasis, chest x-ray showed pulmonary vascular congestion. Patient was stared to IV ATB for possible PNA which was later transitioned to oral antibiotics upon discharge on 05/19/2025    ROS   Review of Systems   The patient denies headaches, lightheadedness, changes in speech/vision, photophobia, dysphagia, diaphoresis, Chest pain, dyspnea, Abdominal pain, recent falls or trauma, and changes in bowel/urinary habits.    Past History     PMHx:    has a past medical history of GERD (gastroesophageal reflux disease), Gout, Hyperlipidemia, Hypertension, Hypothyroidism, and  Sleep apnea.     Allergies:   Allergies[1]     Surgical Hx:   Surgical History[2]     Social HX:   Social History[3]     Meds:   Current Outpatient Medications   Medication Instructions    ketotifen (Zaditor) 0.025 % (0.035 %) ophthalmic solution 1 drop    levothyroxine (SYNTHROID, LEVOXYL) 50 mcg, Daily before breakfast    lisinopril 10 mg, Daily    meloxicam (MOBIC) 15 mg, Daily    metoprolol succinate XL (TOPROL-XL) 50 mg, Daily    pravastatin (PRAVACHOL) 80 mg, Daily    predniSONE (DELTASONE) 20 mg, Daily        Objective    Objective      Visit Vitals  /80 (BP Location: Left arm)   Pulse 66   Temp 36.3 °C (97.3 °F)   Resp 16      BMI Readings from Last 15 Encounters:   07/22/25 33.87 kg/m²   07/17/25 33.17 kg/m²   06/20/25 33.30 kg/m²   05/18/25 32.27 kg/m²   01/17/25 32.55 kg/m²   01/13/25 32.24 kg/m²   07/09/24 30.52 kg/m²   06/20/24 30.67 kg/m²   06/20/24 30.67 kg/m²   05/30/24 30.79 kg/m²   05/09/24 31.43 kg/m²   05/09/24 37.00 kg/m²   04/25/24 36.96 kg/m²   04/25/24 31.41 kg/m²   04/18/24 31.66 kg/m²      Lab Results   Component Value Date    HGBA1C 6.2 (H) 09/24/2018      Lab Results   Component Value Date    HGBA1C 6.2 (H) 09/24/2018     Lab Results   Component Value Date    LDLCALC 55 06/21/2025    CREATININE 0.72 05/19/2025      Lab Results   Component Value Date    WBC 5.6 05/19/2025    HGB 12.9 05/19/2025    HCT 40.2 05/19/2025    MCV 88 05/19/2025     05/19/2025        Chemistry    Lab Results   Component Value Date/Time     05/19/2025 0421    K 4.4 05/19/2025 0421     05/19/2025 0421    CO2 22 05/19/2025 0421    BUN 20 05/19/2025 0421    CREATININE 0.72 05/19/2025 0421    Lab Results   Component Value Date/Time    CALCIUM 8.9 05/19/2025 0421    ALKPHOS 101 05/18/2025 1448    AST 13 05/18/2025 1448    ALT 13 05/18/2025 1448    BILITOT 0.8 05/18/2025 1448           Physical Exam  Physical Exam  Constitutional:       Appearance: Normal appearance.   HENT:      Head:  Normocephalic and atraumatic.      Mouth/Throat:      Mouth: Mucous membranes are moist.     Cardiovascular:      Rate and Rhythm: Normal rate and regular rhythm.      Heart sounds: No murmur heard.     No friction rub. No gallop.   Pulmonary:      Breath sounds: Normal breath sounds. No stridor. No wheezing or rhonchi.   Abdominal:      General: Abdomen is flat. Bowel sounds are normal.      Palpations: Abdomen is soft. There is no mass.      Tenderness: There is no abdominal tenderness. There is no guarding.     Musculoskeletal:         General: No swelling or tenderness. Normal range of motion.      Cervical back: Normal range of motion.     Neurological:      General: No focal deficit present.      Mental Status: She is alert and oriented to person, place, and time.     Psychiatric:         Mood and Affect: Mood normal.         Behavior: Behavior normal.          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      1/17/2025: PET date 1/13/25: MUSCULOSKELETAL:  No focal hypermetabolic lesion is seen in the axial or appendicular  to suggest osseous metastasis.     IMPRESSION:  1. No evidence of hypermetabolic lymphadenopathy or metastatic  disease (Deauville score 1).     Signed by: Armen Hunter 1/13/2025  Plan is to repeat CT CAP in 6 months: RTC after Radiology: CBC CMP in 6 months.    07/22/2025:  CT abdomen pelvis w IV contrast  Result  Date: 7/19/2025  1.  No lymphadenopathy in the abdomen or pelvis. Interval decrease in size of mesenteric mass since most recent CT. The residual lesion no longer demonstrates FDG avidity.   2. Stable left adrenal nodule.   3. Additional chronic and incidental findings as described above.       CT angio chest for pulmonary embolism   Result Date: 05/18/2025  1. No evidence of acute pulmonary embolism.  2. Diffuse heterogeneous ground-glass infiltrate throughout the lung fields bilaterally which may represent air trapping, however, atypical pneumonia/pneumonitis may have similar appearance. Recommend clinical correlation and follow-up to document resolution.  3. Mild patchy atelectasis or infiltrate within the lingula and right middle lobe. Attention in subsequent follow-up studies recommended.  4. Additional findings as above.    Given patient is requesting port removal, we can order a repeat PET scan in 6 months.  If PET scan continues to remain negative, we can remove the chemo- port.    Patient seen with Resident: RTC 6 weeks: Recommendations as above.    Health maintenance  Past Medical, Surgical and Family History: reviewed and updated in chart.   Interval History: Patient has not been hospitalized previously.   Medications and Supplements: Review of all medications by a prescribing practitioner or clinical pharmacist (such as prescriptions, OTCs, herbal therapies and supplements) documented in the medical record.  The patient is not using opioids.   Depression/Suicide Screening: Done, does not endorse depression or suicidal ideation      Please return in 6 months after your PET scan or if symptoms worsen     Bj Jiménez MD  Internal Medicine, PGY- 3  07/22/25 at 10:15 AM          [1] No Known Allergies  [2]   Past Surgical History:  Procedure Laterality Date    APPENDECTOMY      CATARACT EXTRACTION Bilateral     COLONOSCOPY     [3]   Social History  Tobacco Use    Smoking status: Former     Current  packs/day: 0.00     Average packs/day: 1 pack/day for 31.0 years (31.0 ttl pk-yrs)     Types: Cigarettes     Start date:      Quit date:      Years since quittin.5    Smokeless tobacco: Never   Vaping Use    Vaping status: Never Used   Substance Use Topics    Alcohol use: Never    Drug use: Never

## 2025-08-03 PROBLEM — M54.12 RADICULOPATHY, CERVICAL: Status: ACTIVE | Noted: 2025-08-03

## 2025-08-03 NOTE — PROGRESS NOTES
"Physical Therapy Evaluation and Treatment Note    Patient Name: Kellie Rae  MRN: [id number]  Evaluating Clinician: ALEXA Higuera PT at [facility]  Encounter date:  8/4/2025  Time Calculation  Start Time: 1100  Stop Time: 1157  Time Calculation (min): 57 min     PT Therapeutic Procedures Time Entry  Manual Therapy Time Entry: 12      INSURANCE:  Visit Number: 1  Approved Visits: [place]  Insurance Info: 7/29/25 MEDICARE AB: NO AUTH / $257 DED MET / [place]IS[place] / $72.51 used 2025 PT/ST. SUPP: COLONIAL [facility] SUPP: Active / Covers Part B copay / Verified via [facility] w/s / d       PRECAUTIONS/SPECIAL CONCERNS/RELEVANT PMHX: DNR AND NO INTUBATION, lymphoma, osteoporosis, obesity, BENNIE, Aortic valve insufficiency, gout, ASCHD, LOW VISION, She has had chemo for the lymphoma every 3 weeks for at least 6 months.       PT CLINICAL PROBLEM: Cervical radiculopathy Referral from Dr. Avelar      Chief Medical Dx code:   1. Radiculopathy, cervical  Follow Up In Physical Therapy          SUBJECTIVE: Comes to PT with complaints of neck pain and bilateral trapezial area pain and bilateral forearm pain and tingling in left second to fourth fingers. Comes to PT with her . Walks with hand hold due very low vision and needs assist at all times to walk. Had a cervical injection last week by Dr. Avelar and not sure if it has helped yet. Owns a cane and walker but prefers to hold shopping cart and use husbands arm. Lives in private home one level. No stairs. Has a bar to hold unto with a ramp to get into the house.  Has a chair lift to get into basement. Not sure how long she has had this \"maybe a couple of months\" \"I have a cyst pressing into my spine or neck\"     Symptoms/NPRS before Rx: 3  Symptoms/NPRS after Rx:       TREATMENT:  Manual distraction to neck, light prom rotation and side bend, trapezial stretch, levator stretch bilaerally      OBJECTIVE:  STEADI Fall Risk: 9 (score of 4+ indicates fall risk)   OUTCOME " TOOL:  8/3/25 NDI 11/50  Cx RR 45, RL 40, ext 30, flex full  Side bend right 30, left 25  Prom shoulders clear WFL  Mod forward head  + left spurlings    MRI 6/22/25  C5-6: The spinal canal and lateral recesses are severely stenosed  secondary to disc bulge and uncovertebral spurring with cord  compression.    ASSESSMENT: Local neck pain and bilateral trapezial pain with bilateral forearm pain and left 2-4 fingers tingling and good prognosis.      Patient presents with int tissue reactivity,  int condition irritability, and int subject reactivity with impairments noted below and decreased level of functional abilities and would benefit from skilled PT to address limitations and achieve goals noted below.       PLAN: PT manual distraction, light prom to neck, seated heat/massage, arom, reserve traction for only if necessary as pt. Has some express concerns about use of a machine to stretch her neck     Patient/parent/caregiver agreed and consented to plan of care for skilled physical therapy at 1-2 times per week for 4-6 weeks. Physical Therapy to include modalities prn as indicated with emphasis on ACTIVE PT with therapeutic exercise, manual therapy, neuromuscular re-education, gait and functional performance training, education and instruction/practice in a home exercise program (HEP) and self-management skills.       Complexity of Evaluation:   Based on the history including personal factors and/or comorbidities, examination of body systems including body structures and function, activity limitations, and/or participation restrictions, as well as clinical presentation, patient meets criteria for a complexity evaluation.      CARE PLAN/GOALS: ( A = Achieved, NA = Not Achieved, P = Progressing toward goal needs additional skilled PT)    STG's: (  4    weeks /      visits )  1 abolish left hand/fingers tingling  2 abolish bilateral forearm aches/pain    LTG's  (  8    weeks /      visits )  1 Minimal < 4/10 trapezial  symptoms  2 Minimal < 4/10 neck symptoms   3  4  5 The Global Rating of Change Score (GROC) will improve to 5/7 =  “a good deal better” or more.   6 Improve functional outcome tool score (FOTS: DARRELL, NDI, ASES, ABC, etc.) by > 10 points for Minimally Important Clinical Difference (MICD).  7 Patient/client will be independent with a HEP and self-management skills to further enhance recovery and progress.  8 Patient/client will verbalize >75% symptom reduction for frequency and severity of symptoms and/or > two point reduction of VAS/NPRS.  9 The Patient Specific Functional Scale metric (PSFS) will improve from baseline value to greater than 80% functional.

## 2025-08-04 ENCOUNTER — EVALUATION (OUTPATIENT)
Dept: PHYSICAL THERAPY | Facility: CLINIC | Age: 75
End: 2025-08-04
Payer: MEDICARE

## 2025-08-04 DIAGNOSIS — M54.12 RADICULOPATHY, CERVICAL: Primary | ICD-10-CM

## 2025-08-04 PROCEDURE — 97162 PT EVAL MOD COMPLEX 30 MIN: CPT | Mod: GP

## 2025-08-04 PROCEDURE — 97140 MANUAL THERAPY 1/> REGIONS: CPT | Mod: GP

## 2025-08-04 ASSESSMENT — ENCOUNTER SYMPTOMS: OCCASIONAL FEELINGS OF UNSTEADINESS: 1

## 2025-08-07 ENCOUNTER — TREATMENT (OUTPATIENT)
Dept: PHYSICAL THERAPY | Facility: CLINIC | Age: 75
End: 2025-08-07
Payer: MEDICARE

## 2025-08-07 DIAGNOSIS — M54.12 RADICULOPATHY, CERVICAL: Primary | ICD-10-CM

## 2025-08-07 PROCEDURE — 97014 ELECTRIC STIMULATION THERAPY: CPT | Mod: GP,CQ

## 2025-08-07 PROCEDURE — 97140 MANUAL THERAPY 1/> REGIONS: CPT | Mod: GP,CQ

## 2025-08-07 PROCEDURE — 97110 THERAPEUTIC EXERCISES: CPT | Mod: GP,CQ

## 2025-08-07 NOTE — PROGRESS NOTES
"Physical Therapy Treatment Note    Patient Name: Kellie Rae  MRN: [id number]  Evaluating Clinician: ALEXA Higuera PT at [facility]  Encounter date:  8/7/2025  Time Calculation  Start Time: 1010  Stop Time: 1050  Time Calculation (min): 40 min  PT Modalities Time Entry  E-Stim (Unattended) Time Entry: 10  PT Therapeutic Procedures Time Entry  Therapeutic Exercise Time Entry: 15  Manual Therapy Time Entry: 15      INSURANCE:  Visit Number: 2  Approved Visits: [place]  Insurance Info: 7/29/25 MEDICARE AB: NO AUTH / $257 DED MET / [place] VISITS / $72.51 used 2025 PT/ST. SUPP: [facility]  SUPP: Active / Covers Part B copay / Verified via [facility] w/s / d       PRECAUTIONS/SPECIAL CONCERNS/RELEVANT PMHX: DNR AND NO INTUBATION, lymphoma, osteoporosis, obesity, BENNIE, Aortic valve insufficiency, gout, ASCHD, LOW VISION, She has had chemo for the lymphoma every 3 weeks for at least 6 months.       PT CLINICAL PROBLEM: Cervical radiculopathy Referral from Dr. Avelar      Chief Medical Dx code:   1. Radiculopathy, cervical  Follow Up In Physical Therapy          SUBJECTIVE: Comes to PT with complaints of neck pain and bilateral trapezial area pain and bilateral forearm pain and tingling in left second to fourth fingers. Comes to PT with her . Walks with hand hold due very low vision and needs assist at all times to walk. Had a cervical injection last week by Dr. Avelar and not sure if it has helped yet. Owns a cane and walker but prefers to hold shopping cart and use husbands arm. Lives in private home one level. No stairs. Has a bar to hold unto with a ramp to get into the house.  Has a chair lift to get into basement. Not sure how long she has had this \"maybe a couple of months\" \"I have a cyst pressing into my spine or neck\"     8-7-25; Tightness persist B cervical / UT area L > R,  discomfort cont. from \"elbow down\" both arms. Took\" pain pill and tylenol \"prior to session    Symptoms/NPRS before Rx: " "3  Symptoms/NPRS after Rx:       TREATMENT:  SUPINE:  Manual distraction to neck, light prom rotation and side bend, trapezial stretch, levator stretch bilaerally  Over the door pulleys  US bike x 1 min  Sitting : STM B cervical / UT     Unattended electrical stimulation:  Interferential current  Parameters:  80 to 150 Hz  Patient position:  Seated  Electrodes placed:  B cervical/ UT with HP  Comment:  skin check good    Pt performs shld circles / shld shrugs and CROM with  gentle UT stretch and rotation in hot shower at home    OBJECTIVE:  STEADI Fall Risk: 9 (score of 4+ indicates fall risk)   OUTCOME TOOL:  8/3/25 NDI 11/50  Cx RR 45, RL 40, ext 30, flex full  Side bend right 30, left 25  Prom shoulders clear WFL  Mod forward head  + left spurlings    MRI 6/22/25  C5-6: The spinal canal and lateral recesses are severely stenosed  secondary to disc bulge and uncovertebral spurring with cord  compression.    ASSESSMENT:  Performed ex  well , requires assist of  for bal. when walking , discomfort in forearms and cervical area persist however stated \" it feels good now\"  following session completion    PLAN: PT manual distraction, light prom to neck, seated heat/massage, arom, reserve traction for only if necessary as pt. Has some express concerns about use of a machine to stretch her neck     Patient/parent/caregiver agreed and consented to plan of care for skilled physical therapy at 1-2 times per week for 4-6 weeks. Physical Therapy to include modalities prn as indicated with emphasis on ACTIVE PT with therapeutic exercise, manual therapy, neuromuscular re-education, gait and functional performance training, education and instruction/practice in a home exercise program (HEP) and self-management skills.     CARE PLAN/GOALS: ( A = Achieved, NA = Not Achieved, P = Progressing toward goal needs additional skilled PT)    STG's: (  4    weeks /      visits )  1 abolish left hand/fingers tingling  2 abolish " bilateral forearm aches/pain    LTG's  (  8    weeks /      visits )  1 Minimal < 4/10 trapezial symptoms  2 Minimal < 4/10 neck symptoms   3  4  5 The Global Rating of Change Score (GROC) will improve to 5/7 =  “a good deal better” or more.   6 Improve functional outcome tool score (FOTS: DARRELL, NDI, ASES, ABC, etc.) by > 10 points for Minimally Important Clinical Difference (MICD).  7 Patient/client will be independent with a HEP and self-management skills to further enhance recovery and progress.  8 Patient/client will verbalize >75% symptom reduction for frequency and severity of symptoms and/or > two point reduction of VAS/NPRS.  9 The Patient Specific Functional Scale metric (PSFS) will improve from baseline value to greater than 80% functional.

## 2025-08-12 ENCOUNTER — HOSPITAL ENCOUNTER (OUTPATIENT)
Dept: RADIOLOGY | Facility: HOSPITAL | Age: 75
Discharge: HOME | End: 2025-08-12
Payer: MEDICARE

## 2025-08-12 DIAGNOSIS — E78.2 MIXED HYPERLIPIDEMIA: ICD-10-CM

## 2025-08-12 PROCEDURE — 75571 CT HRT W/O DYE W/CA TEST: CPT

## 2025-08-13 ENCOUNTER — TREATMENT (OUTPATIENT)
Dept: PHYSICAL THERAPY | Facility: CLINIC | Age: 75
End: 2025-08-13
Payer: MEDICARE

## 2025-08-13 DIAGNOSIS — M54.12 RADICULOPATHY, CERVICAL: Primary | ICD-10-CM

## 2025-08-13 PROCEDURE — 97110 THERAPEUTIC EXERCISES: CPT | Mod: GP,CQ

## 2025-08-13 PROCEDURE — 97140 MANUAL THERAPY 1/> REGIONS: CPT | Mod: GP,CQ

## 2025-08-21 ENCOUNTER — TREATMENT (OUTPATIENT)
Dept: PHYSICAL THERAPY | Facility: CLINIC | Age: 75
End: 2025-08-21
Payer: MEDICARE

## 2025-08-21 DIAGNOSIS — M54.12 RADICULOPATHY, CERVICAL: Primary | ICD-10-CM

## 2025-08-21 PROCEDURE — 97140 MANUAL THERAPY 1/> REGIONS: CPT | Mod: GP

## 2025-08-21 PROCEDURE — 97110 THERAPEUTIC EXERCISES: CPT | Mod: GP
